# Patient Record
Sex: FEMALE | Race: WHITE | NOT HISPANIC OR LATINO | Employment: OTHER | URBAN - METROPOLITAN AREA
[De-identification: names, ages, dates, MRNs, and addresses within clinical notes are randomized per-mention and may not be internally consistent; named-entity substitution may affect disease eponyms.]

---

## 2017-01-04 ENCOUNTER — PROCEDURE VISIT (OUTPATIENT)
Dept: GYNECOLOGIC ONCOLOGY | Facility: CLINIC | Age: 67
End: 2017-01-04

## 2017-01-04 VITALS
OXYGEN SATURATION: 96 % | SYSTOLIC BLOOD PRESSURE: 119 MMHG | HEART RATE: 65 BPM | RESPIRATION RATE: 16 BRPM | WEIGHT: 228 LBS | HEIGHT: 57 IN | TEMPERATURE: 98.3 F | BODY MASS INDEX: 49.19 KG/M2 | DIASTOLIC BLOOD PRESSURE: 73 MMHG

## 2017-01-04 DIAGNOSIS — C54.1 ENDOMETRIAL CANCER (HCC): Primary | ICD-10-CM

## 2017-01-04 PROCEDURE — 99213 OFFICE O/P EST LOW 20 MIN: CPT | Performed by: NURSE PRACTITIONER

## 2017-01-04 NOTE — MR AVS SNAPSHOT
Keisha PEDROZA Randy   1/4/2017 9:30 AM   Procedure visit    Dept Phone:  [phone]   Encounter #:  [id number]    Provider:  PRIMITIVO Lenz   Department:  [facility] GYNECOLOGIC ONCOLOGY                Your Full Care Plan              Your Updated Medication List          This list is accurate as of: 1/4/17 10:02 AM.  Always use your most recent med list.                aspirin 81 MG tablet   Take 1 tablet by mouth Daily.       benzonatate 100 MG capsule   Commonly known as:  TESSALON PERLES   Take 1 capsule by mouth 3 (Three) Times a Day As Needed for cough.       budesonide-formoterol 160-4.5 MCG/ACT inhaler   Commonly known as:  SYMBICORT   Inhale 2 puffs 2 (two) times a day.       CARTIA  MG 24 hr capsule   Generic drug:  diltiaZEM CD   Take 1 capsule by mouth Daily.       diclofenac 75 MG EC tablet   Commonly known as:  VOLTAREN   Take 1 tablet by mouth 2 (two) times a day.       glipiZIDE 5 MG tablet   Commonly known as:  GLUCOTROL   Take one po bid       lisinopril 20 MG tablet   Commonly known as:  PRINIVIL,ZESTRIL   Take 1 tablet by mouth Daily.       simvastatin 20 MG tablet   Commonly known as:  ZOCOR   Take 1 tablet by mouth Every Night.       Vitamin D3 2000 UNITS capsule               You Were Diagnosed With        Codes Comments    Endometrial cancer    -  Primary ICD-10-CM: C54.1  ICD-9-CM: 182.0       Instructions     None    Patient Instructions History      Upcoming Appointments     Visit Type Date Time Department    FOLLOW UP 1/4/2017  9:30 AM [facility]    OFFICE VISIT 1/5/2017  2:00 PM [facility]    PHYSICAL 4/3/2017  8:00 AM [facility] [place] [facility]    MAMMO [facility] DIAG DIG [facility] LT 6/13/2017  9:45 AM  [facility]    FOLLOW UP 1 UNIT 7/17/2017  9:30 AM [facility]      MyChart Signup     Our records indicate that you have an active [facility] [employer] account.    You can view your After Visit Summary by going to  [email or web address] Yourself and logging in with your [employer] username and password.  If you don't have a [employer] username and password but a parent or guardian has access to your record, the parent or guardian should login with their own [employer] username and password and access your record to view the After Visit Summary.    If you have questions, you can email [email or web address] or call [phone] to talk to our [employer] staff.  Remember, [employer] is NOT to be used for urgent needs.  For medical emergencies, dial 911.               Other Info from Your Visit           Your Appointments     Jan 05, 2017  2:00 PM EST   Office Visit with Bon Neal MD   [facility] FAMILY MEDICINE (--)    [address]  [facility] [address]   [phone]           Arrive 15 minutes prior to appointment.            Apr 03, 2017  8:00 AM EDT   Physical with Bon Neal MD   [facility] (--)    [address]  [facility] [address] [phone]           Arrive 15 minutes prior to appointment.            Jun 13, 2017  9:45 AM EDT   Mammo jeanine diag dig lynn lt with [facility] MAMM 3   [facility] ([place])    [address] [facility] [facility] [address]  [place] [id number]   [phone]           Same day results for Mammography and Ultrasound may extend the patient's time in the department from 1 - 3 or more hours. Bring outside films/reports to your appoint.  Area of concern must be marked on films. If patinet is breastfeeding, vie instructions. Do not apply any powders, perfumes, deodorants, lotions, oils, or body sprays prior to your appointment on the day of the exam. Arrive 20 min prior to your scheduled appointment time.            Jul 17, 2017  9:30 AM EDT   FOLLOW UP with PRIMITIVO Lenz   [facility] GYNECOLOGIC ONCOLOGY (--)    [address]  [facility]  "[id number] [phone]              Allergies     Sulfa Antibiotics        Reason for Visit     Follow-up cancer surveillance      Vital Signs     Blood Pressure Pulse Temperature Respirations Height Weight    119/73 65 98.3 °F (36.8 °C) (Temporal Artery ) 16 57\" (144.8 cm) 228 lb (103 kg)    Oxygen Saturation Body Mass Index Smoking Status             96% 49.34 kg/m2 Never Smoker         Problems and Diagnoses Noted     Endometrial cancer        "
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
## 2017-01-05 ENCOUNTER — OFFICE VISIT (OUTPATIENT)
Dept: FAMILY MEDICINE CLINIC | Facility: CLINIC | Age: 67
End: 2017-01-05

## 2017-01-05 VITALS
HEIGHT: 57 IN | BODY MASS INDEX: 49.19 KG/M2 | RESPIRATION RATE: 16 BRPM | WEIGHT: 228 LBS | HEART RATE: 74 BPM | DIASTOLIC BLOOD PRESSURE: 72 MMHG | SYSTOLIC BLOOD PRESSURE: 122 MMHG | TEMPERATURE: 97.8 F | OXYGEN SATURATION: 98 %

## 2017-01-05 DIAGNOSIS — J40 BRONCHITIS: Primary | ICD-10-CM

## 2017-01-05 PROCEDURE — 99213 OFFICE O/P EST LOW 20 MIN: CPT | Performed by: FAMILY MEDICINE

## 2017-01-05 RX ORDER — PROMETHAZINE HYDROCHLORIDE AND CODEINE PHOSPHATE 6.25; 1 MG/5ML; MG/5ML
SYRUP ORAL
Qty: 240 ML | Refills: 0 | Status: SHIPPED | OUTPATIENT
Start: 2017-01-05 | End: 2017-04-03

## 2017-01-05 NOTE — PROGRESS NOTES
Subjective   Keisha Padilla is a 66 y.o. female.     History of Present Illness   The patient describes several days of runny nose and congestion.  It seems to be not improving with home care.  The patient reports associated sinus drainage and scratchy throat.  The patient is now experiencing an intermittent croupy cough.  There is no fever, chills or acute dyspnea.  Her  and grandchildren have similar symptoms.  She is requesting a cough syrup.    Review of Systems   Constitutional: Negative for chills and fever.   HENT: Positive for congestion, postnasal drip and rhinorrhea. Negative for sinus pressure and sore throat.    Eyes: Negative for discharge.   Respiratory: Positive for cough. Negative for shortness of breath and wheezing.    Gastrointestinal: Negative for diarrhea, nausea and vomiting.   Musculoskeletal: Negative for myalgias.   Skin: Negative for rash.   Allergic/Immunologic: Positive for environmental allergies.   Neurological: Negative for dizziness.     Objective   Physical Exam   Constitutional: She is oriented to person, place, and time. She appears well-developed and well-nourished.   HENT:   Head: Normocephalic and atraumatic.   Right Ear: Hearing, tympanic membrane, external ear and ear canal normal.   Left Ear: Hearing, tympanic membrane, external ear and ear canal normal.   Nose: Mucosal edema and rhinorrhea present.   Mouth/Throat: Uvula is midline. Posterior oropharyngeal erythema present.   Eyes: Conjunctivae are normal. Left eye exhibits no discharge.   Neck: Neck supple.   Cardiovascular: Normal rate, regular rhythm and normal heart sounds.    Pulmonary/Chest: Effort normal and breath sounds normal.   Musculoskeletal: Normal range of motion.   Lymphadenopathy:     She has no cervical adenopathy.   Neurological: She is alert and oriented to person, place, and time.   Skin: Skin is warm. No rash noted.   Psychiatric: She has a normal mood and affect.   Vitals  reviewed.    Assessment/Plan   Diagnoses and all orders for this visit:    Bronchitis  -     PHENERGAN with CODEINE 6.25-10 MG/5ML syrup; Take 5 to 10 ML PO every 8 hours prn cough  -     STAHIST AD 25-60 MG tablet; Take 1/2 to 1 tab po every 12 hours PRN congestion  - Continue with home Symbicort Inhaler  - Rest, fluids, avoid sick contacts and RTC if not improved.

## 2017-01-05 NOTE — MR AVS SNAPSHOT
Keisha Padilla   1/5/2017 2:00 PM   Office Visit    Provider:  Bon Neal MD   Department:  [facility] FAMILY MEDICINE   Dept Phone:  [phone]                Your Full Care Plan              Today's Medication Changes          These changes are accurate as of: 1/5/17  2:43 PM.  If you have any questions, ask your nurse or doctor.               New Medication(s)Ordered:     Chlorcyclizine-Pseudoephed 25-60 MG tablet   Commonly known as:  STAHIST AD   Take 1/2 to 1 tab po every 12 hours PRN congestion   Started by:  Bon Neal MD       promethazine-codeine 6.25-10 MG/5ML syrup   Commonly known as:  PHENERGAN with CODEINE   Take 5 to 10 ML PO every 8 hours prn cough   Started by:  Bon Neal MD         Stop taking medication(s)listed here:     benzonatate 100 MG capsule   Commonly known as:  TESSALON PERLES   Stopped by:  Bon Neal MD                Where to Get Your Medications      These medications were sent to [facility] [address] [place] [address] - [phone]  - [phone] [place] [address]     Phone:  [phone]     Chlorcyclizine-Pseudoephed 25-60 MG tablet         You can get these medications from any pharmacy     Bring a paper prescription for each of these medications     promethazine-codeine 6.25-10 MG/5ML syrup                  Your Updated Medication List          This list is accurate as of: 1/5/17  2:43 PM.  Always use your most recent med list.                aspirin 81 MG tablet   Take 1 tablet by mouth Daily.       budesonide-formoterol 160-4.5 MCG/ACT inhaler   Commonly known as:  SYMBICORT   Inhale 2 puffs 2 (two) times a day.       CARTIA  MG 24 hr capsule   Generic drug:  diltiaZEM CD   Take 1 capsule by mouth Daily.       Chlorcyclizine-Pseudoephed 25-60 MG tablet   Commonly known as:  STAHIST AD   Take 1/2 to 1 tab po every 12 hours PRN congestion       diclofenac 75  MG EC tablet   Commonly known as:  VOLTAREN   Take 1 tablet by mouth 2 (two) times a day.       glipiZIDE 5 MG tablet   Commonly known as:  GLUCOTROL   Take one po bid       lisinopril 20 MG tablet   Commonly known as:  PRINIVIL,ZESTRIL   Take 1 tablet by mouth Daily.       promethazine-codeine 6.25-10 MG/5ML syrup   Commonly known as:  PHENERGAN with CODEINE   Take 5 to 10 ML PO every 8 hours prn cough       simvastatin 20 MG tablet   Commonly known as:  ZOCOR   Take 1 tablet by mouth Every Night.       Vitamin D3 2000 UNITS capsule               You Were Diagnosed With        Codes Comments    Bronchitis    -  Primary ICD-10-CM: J40  ICD-9-CM: 490       Instructions     None    Patient Instructions History      [employer] Signup     Our records indicate that you have an active [employer] account.    You can view your After Visit Summary by going to [employer] and logging in with your [employer] username and password.  If you don't have a [employer] username and password but a parent or guardian has access to your record, the parent or guardian should login with their own [employer] username and password and access your record to view the After Visit Summary.    If you have questions, you can email [email or web address] or call [phone] to talk to our [employer] staff.  Remember, [employer] is NOT to be used for urgent needs.  For medical emergencies, dial 911.               Other Info from Your Visit           Your Appointments     Apr 03, 2017  8:00 AM EDT   Physical with Bon Neal MD   [facility] (--)    [address]  [facility] [address] [phone]           Arrive 15 minutes prior to appointment.            Jun 13, 2017  9:45 AM EDT   Mammo jeanine diag dig lynn lt with [facility] MAMM 3   [facility] ([place])    [address] [address] [phone]           Same day  "results for Mammography and Ultrasound may extend the patient's time in the department from 1 - 3 or more hours. Bring outside films/reports to your appoint.  Area of concern must be marked on films. If patinet is breastfeeding, vie instructions. Do not apply any powders, perfumes, deodorants, lotions, oils, or body sprays prior to your appointment on the day of the exam. Arrive 20 min prior to your scheduled appointment time.            Jul 17, 2017  9:30 AM EDT   FOLLOW UP with PRIMITIVO Lenz   [facility] GYNECOLOGIC ONCOLOGY (--)    [address] [address] [phone]              Allergies     Sulfa Antibiotics        Vital Signs     Blood Pressure Pulse Temperature Respirations Height Weight    122/72 74 97.8 °F (36.6 °C) 16 57\" (144.8 cm) 228 lb (103 kg)    Oxygen Saturation Body Mass Index Smoking Status             98% 49.34 kg/m2 Never Smoker         Problems and Diagnoses Noted     Bronchitis    -  Primary      "

## 2017-04-03 ENCOUNTER — OFFICE VISIT (OUTPATIENT)
Dept: FAMILY MEDICINE CLINIC | Facility: CLINIC | Age: 67
End: 2017-04-03

## 2017-04-03 VITALS
OXYGEN SATURATION: 99 % | SYSTOLIC BLOOD PRESSURE: 118 MMHG | WEIGHT: 230 LBS | BODY MASS INDEX: 48.28 KG/M2 | TEMPERATURE: 97.5 F | RESPIRATION RATE: 16 BRPM | DIASTOLIC BLOOD PRESSURE: 74 MMHG | HEART RATE: 59 BPM | HEIGHT: 58 IN

## 2017-04-03 DIAGNOSIS — Z00.00 MEDICARE ANNUAL WELLNESS VISIT, SUBSEQUENT: Primary | ICD-10-CM

## 2017-04-03 DIAGNOSIS — I10 ESSENTIAL HYPERTENSION: ICD-10-CM

## 2017-04-03 DIAGNOSIS — E55.9 VITAMIN D DEFICIENCY: ICD-10-CM

## 2017-04-03 DIAGNOSIS — E78.01 FAMILIAL HYPERCHOLESTEROLEMIA: ICD-10-CM

## 2017-04-03 DIAGNOSIS — E11.9 TYPE 2 DIABETES MELLITUS WITHOUT COMPLICATION, WITHOUT LONG-TERM CURRENT USE OF INSULIN (HCC): ICD-10-CM

## 2017-04-03 PROBLEM — C54.1 ENDOMETRIAL CANCER (HCC): Status: RESOLVED | Noted: 2017-01-04 | Resolved: 2017-04-03

## 2017-04-03 LAB
25(OH)D3 SERPL-MCNC: 32.9 NG/ML
ALBUMIN SERPL-MCNC: 4.2 G/DL (ref 3.2–4.8)
ALBUMIN/GLOB SERPL: 1.6 G/DL (ref 1.5–2.5)
ALP SERPL-CCNC: 119 U/L (ref 25–100)
ALT SERPL W P-5'-P-CCNC: 30 U/L (ref 7–40)
ANION GAP SERPL CALCULATED.3IONS-SCNC: 11 MMOL/L (ref 3–11)
ARTICHOKE IGE QN: 94 MG/DL (ref 0–130)
AST SERPL-CCNC: 29 U/L (ref 0–33)
BASOPHILS # BLD AUTO: 0.02 10*3/MM3 (ref 0–0.2)
BASOPHILS NFR BLD AUTO: 0.3 % (ref 0–1)
BILIRUB BLD-MCNC: NEGATIVE MG/DL
BILIRUB SERPL-MCNC: 0.5 MG/DL (ref 0.3–1.2)
BUN BLD-MCNC: 19 MG/DL (ref 9–23)
BUN/CREAT SERPL: 23.8 (ref 7–25)
CALCIUM SPEC-SCNC: 9.7 MG/DL (ref 8.7–10.4)
CHLORIDE SERPL-SCNC: 103 MMOL/L (ref 99–109)
CHOLEST SERPL-MCNC: 180 MG/DL (ref 0–200)
CLARITY, POC: CLEAR
CO2 SERPL-SCNC: 27 MMOL/L (ref 20–31)
COLOR UR: YELLOW
CREAT BLD-MCNC: 0.8 MG/DL (ref 0.6–1.3)
CRP SERPL-MCNC: 1.46 MG/DL (ref 0–1)
DEPRECATED RDW RBC AUTO: 46.5 FL (ref 37–54)
EOSINOPHIL # BLD AUTO: 0.22 10*3/MM3 (ref 0.1–0.3)
EOSINOPHIL NFR BLD AUTO: 2.8 % (ref 0–3)
ERYTHROCYTE [DISTWIDTH] IN BLOOD BY AUTOMATED COUNT: 13.5 % (ref 11.3–14.5)
GFR SERPL CREATININE-BSD FRML MDRD: 71 ML/MIN/1.73
GLOBULIN UR ELPH-MCNC: 2.6 GM/DL
GLUCOSE BLD-MCNC: 123 MG/DL (ref 70–100)
GLUCOSE UR STRIP-MCNC: NEGATIVE MG/DL
HBA1C MFR BLD: 6.7 %
HCT VFR BLD AUTO: 43.8 % (ref 34.5–44)
HCV AB SER DONR QL: NORMAL
HDLC SERPL-MCNC: 52 MG/DL (ref 40–60)
HGB BLD-MCNC: 14.3 G/DL (ref 11.5–15.5)
IMM GRANULOCYTES # BLD: 0.03 10*3/MM3 (ref 0–0.03)
IMM GRANULOCYTES NFR BLD: 0.4 % (ref 0–0.6)
KETONES UR QL: NEGATIVE
LEUKOCYTE EST, POC: ABNORMAL
LYMPHOCYTES # BLD AUTO: 2.04 10*3/MM3 (ref 0.6–4.8)
LYMPHOCYTES NFR BLD AUTO: 26.1 % (ref 24–44)
MCH RBC QN AUTO: 30.8 PG (ref 27–31)
MCHC RBC AUTO-ENTMCNC: 32.6 G/DL (ref 32–36)
MCV RBC AUTO: 94.4 FL (ref 80–99)
MONOCYTES # BLD AUTO: 0.56 10*3/MM3 (ref 0–1)
MONOCYTES NFR BLD AUTO: 7.2 % (ref 0–12)
NEUTROPHILS # BLD AUTO: 4.96 10*3/MM3 (ref 1.5–8.3)
NEUTROPHILS NFR BLD AUTO: 63.2 % (ref 41–71)
NITRITE UR-MCNC: NEGATIVE MG/ML
PH UR: 5.5 [PH] (ref 5–8)
PLATELET # BLD AUTO: 225 10*3/MM3 (ref 150–450)
PMV BLD AUTO: 12.3 FL (ref 6–12)
POC CREATININE URINE: 50
POC MICROALBUMIN URINE: 10
POTASSIUM BLD-SCNC: 4.5 MMOL/L (ref 3.5–5.5)
PROT SERPL-MCNC: 6.8 G/DL (ref 5.7–8.2)
PROT UR STRIP-MCNC: NEGATIVE MG/DL
RBC # BLD AUTO: 4.64 10*6/MM3 (ref 3.89–5.14)
RBC # UR STRIP: NEGATIVE /UL
SODIUM BLD-SCNC: 141 MMOL/L (ref 132–146)
SP GR UR: 1 (ref 1–1.03)
TRIGL SERPL-MCNC: 150 MG/DL (ref 0–150)
TSH SERPL DL<=0.05 MIU/L-ACNC: 1.78 MIU/ML (ref 0.35–5.35)
URATE SERPL-MCNC: 6.3 MG/DL (ref 3.1–7.8)
UROBILINOGEN UR QL: NORMAL
WBC NRBC COR # BLD: 7.83 10*3/MM3 (ref 3.5–10.8)

## 2017-04-03 PROCEDURE — 85025 COMPLETE CBC W/AUTO DIFF WBC: CPT | Performed by: FAMILY MEDICINE

## 2017-04-03 PROCEDURE — G0439 PPPS, SUBSEQ VISIT: HCPCS | Performed by: FAMILY MEDICINE

## 2017-04-03 PROCEDURE — 86140 C-REACTIVE PROTEIN: CPT | Performed by: FAMILY MEDICINE

## 2017-04-03 PROCEDURE — 81003 URINALYSIS AUTO W/O SCOPE: CPT | Performed by: FAMILY MEDICINE

## 2017-04-03 PROCEDURE — 36415 COLL VENOUS BLD VENIPUNCTURE: CPT | Performed by: FAMILY MEDICINE

## 2017-04-03 PROCEDURE — 82044 UR ALBUMIN SEMIQUANTITATIVE: CPT | Performed by: FAMILY MEDICINE

## 2017-04-03 PROCEDURE — 82306 VITAMIN D 25 HYDROXY: CPT | Performed by: FAMILY MEDICINE

## 2017-04-03 PROCEDURE — 80061 LIPID PANEL: CPT | Performed by: FAMILY MEDICINE

## 2017-04-03 PROCEDURE — 84550 ASSAY OF BLOOD/URIC ACID: CPT | Performed by: FAMILY MEDICINE

## 2017-04-03 PROCEDURE — 83036 HEMOGLOBIN GLYCOSYLATED A1C: CPT | Performed by: FAMILY MEDICINE

## 2017-04-03 PROCEDURE — 84443 ASSAY THYROID STIM HORMONE: CPT | Performed by: FAMILY MEDICINE

## 2017-04-03 PROCEDURE — 80053 COMPREHEN METABOLIC PANEL: CPT | Performed by: FAMILY MEDICINE

## 2017-04-03 PROCEDURE — 86803 HEPATITIS C AB TEST: CPT | Performed by: FAMILY MEDICINE

## 2017-04-03 RX ORDER — GLIPIZIDE 5 MG/1
TABLET ORAL
Qty: 180 TABLET | Refills: 3 | Status: SHIPPED | OUTPATIENT
Start: 2017-04-03 | End: 2017-10-03 | Stop reason: SDUPTHER

## 2017-04-03 RX ORDER — LISINOPRIL 20 MG/1
20 TABLET ORAL DAILY
Qty: 90 TABLET | Refills: 3 | Status: SHIPPED | OUTPATIENT
Start: 2017-04-03 | End: 2017-10-03 | Stop reason: SDUPTHER

## 2017-04-03 RX ORDER — SIMVASTATIN 20 MG
20 TABLET ORAL NIGHTLY
Qty: 90 TABLET | Refills: 3 | Status: SHIPPED | OUTPATIENT
Start: 2017-04-03 | End: 2017-10-03 | Stop reason: SDUPTHER

## 2017-04-03 RX ORDER — DILTIAZEM HYDROCHLORIDE 240 MG/1
240 CAPSULE, COATED, EXTENDED RELEASE ORAL DAILY
Qty: 90 CAPSULE | Refills: 3 | Status: SHIPPED | OUTPATIENT
Start: 2017-04-03 | End: 2017-10-03 | Stop reason: SDUPTHER

## 2017-04-08 NOTE — PROGRESS NOTES
Your lab results are all stable and satisfactory.  If you have any questions or concerns, please don't hesitate to contact us.  Thank you.

## 2017-05-03 ENCOUNTER — TELEPHONE (OUTPATIENT)
Dept: GYNECOLOGIC ONCOLOGY | Facility: CLINIC | Age: 67
End: 2017-05-03

## 2017-06-13 ENCOUNTER — HOSPITAL ENCOUNTER (OUTPATIENT)
Dept: MAMMOGRAPHY | Facility: HOSPITAL | Age: 67
Discharge: HOME | End: 2017-06-13
Admitting: NURSE PRACTITIONER

## 2017-06-13 ENCOUNTER — TRANSCRIBE ORDERS (OUTPATIENT)
Dept: GYNECOLOGIC ONCOLOGY | Facility: CLINIC | Age: 67
End: 2017-06-13

## 2017-06-13 DIAGNOSIS — R92.8 ABNORMAL MAMMOGRAM: ICD-10-CM

## 2017-06-13 DIAGNOSIS — R92.8 ABNORMAL MAMMOGRAM: Primary | ICD-10-CM

## 2017-06-13 PROCEDURE — G0206 DX MAMMO INCL CAD UNI: HCPCS | Performed by: RADIOLOGY

## 2017-06-13 PROCEDURE — G0206 DX MAMMO INCL CAD UNI: HCPCS

## 2017-06-13 PROCEDURE — G0279 TOMOSYNTHESIS, MAMMO: HCPCS

## 2017-06-13 PROCEDURE — G0279 TOMOSYNTHESIS, MAMMO: HCPCS | Performed by: RADIOLOGY

## 2017-07-17 ENCOUNTER — OFFICE VISIT (OUTPATIENT)
Dept: GYNECOLOGIC ONCOLOGY | Facility: CLINIC | Age: 67
End: 2017-07-17

## 2017-07-17 VITALS
OXYGEN SATURATION: 96 % | TEMPERATURE: 98.1 F | DIASTOLIC BLOOD PRESSURE: 65 MMHG | BODY MASS INDEX: 48.28 KG/M2 | HEART RATE: 78 BPM | WEIGHT: 231 LBS | SYSTOLIC BLOOD PRESSURE: 144 MMHG | RESPIRATION RATE: 20 BRPM

## 2017-07-17 DIAGNOSIS — C54.1 ENDOMETRIAL CANCER (HCC): Primary | ICD-10-CM

## 2017-07-17 PROCEDURE — 99214 OFFICE O/P EST MOD 30 MIN: CPT | Performed by: NURSE PRACTITIONER
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
## 2017-08-07 ENCOUNTER — TELEPHONE (OUTPATIENT)
Dept: SOCIAL WORK | Facility: HOSPITAL | Age: 67
End: 2017-08-07

## 2017-08-07 NOTE — TELEPHONE ENCOUNTER
SW called pt to discuss interest in advanced care planning.  SW left message asking pt to call back at her earliest convenience.  SW available for ongoing support and resource needs.

## 2017-09-08 ENCOUNTER — OFFICE VISIT (OUTPATIENT)
Dept: FAMILY MEDICINE CLINIC | Facility: CLINIC | Age: 67
End: 2017-09-08

## 2017-09-08 VITALS — RESPIRATION RATE: 16 BRPM | WEIGHT: 227 LBS | HEART RATE: 72 BPM | TEMPERATURE: 99.8 F | BODY MASS INDEX: 47.44 KG/M2

## 2017-09-08 DIAGNOSIS — K52.9 GASTROENTERITIS: Primary | ICD-10-CM

## 2017-09-08 PROCEDURE — 99213 OFFICE O/P EST LOW 20 MIN: CPT | Performed by: FAMILY MEDICINE

## 2017-09-08 PROCEDURE — 96372 THER/PROPH/DIAG INJ SC/IM: CPT | Performed by: FAMILY MEDICINE

## 2017-09-08 RX ORDER — DICYCLOMINE HYDROCHLORIDE 10 MG/1
10 CAPSULE ORAL 3 TIMES DAILY PRN
Qty: 20 CAPSULE | Refills: 1 | Status: SHIPPED | OUTPATIENT
Start: 2017-09-08 | End: 2018-04-03

## 2017-09-08 RX ORDER — PROMETHAZINE HYDROCHLORIDE 25 MG/ML
12.5 INJECTION, SOLUTION INTRAMUSCULAR; INTRAVENOUS ONCE
Status: COMPLETED | OUTPATIENT
Start: 2017-09-08 | End: 2017-09-08

## 2017-09-08 RX ADMIN — PROMETHAZINE HYDROCHLORIDE 12.5 MG: 25 INJECTION, SOLUTION INTRAMUSCULAR; INTRAVENOUS at 16:28

## 2017-09-08 NOTE — PROGRESS NOTES
Subjective   Keisha Padilla is a 67 y.o. female.     History of Present Illness   acutelu ill last night with vomiting, loss of appetite.  Sick after eating barrera, egg and cheese biscutt yesterday   No diarrhea. Rested poor. Does not feel well today. Past gallbladder surgery, hysterectomy.  The following portions of the patient's history were reviewed and updated as appropriate: allergies, current medications, past family history, past medical history, past social history, past surgical history and problem list.    Review of Systems   Constitutional: Negative.    HENT: Negative.    Respiratory: Negative.    Cardiovascular: Negative.    Gastrointestinal: Positive for abdominal pain, nausea and vomiting.       Objective   Physical Exam   Constitutional: She appears well-developed and well-nourished. No distress.   Pulmonary/Chest: Effort normal and breath sounds normal.   Abdominal: She exhibits no distension and no mass. Bowel sounds are decreased. There is generalized tenderness. There is no CVA tenderness.   Vitals reviewed.      Assessment/Plan   Keisha was seen today for abdominal pain.    Diagnoses and all orders for this visit:    Gastroenteritis  -     promethazine (PHENERGAN) injection 12.5 mg; Inject 0.5 mL into the shoulder, thigh, or buttocks 1 (One) Time.  -     dicyclomine (BENTYL) 10 MG capsule; Take 1 capsule by mouth 3 (Three) Times a Day As Needed (nausea).        No milk next tow days.

## 2017-09-10 NOTE — PROGRESS NOTES
ED HPI GENERAL MEDICAL PROBLEM





- General


Chief Complaint: General


Stated Complaint: can't walk


Time Seen by Provider: 09/10/17 17:40


Source of Information: Reports: Patient


History Limitations: Reports: No Limitations





- History of Present Illness


INITIAL COMMENTS - FREE TEXT/NARRATIVE: 


I am in the emergency room  discharging my patient. Pt's  bring her into 

the emergency room in Wheel chair, and she is crying and tearful and claims 

that she has been having pain in both her legs since friday. Pain is all over 

the legs. She is on gabapentin and requip and tinazadine, she claims none of 

them are helping. No swelling of the legs. No redness. No swelling of pain in 

her knees. No shortness of breath of chest tightness. . No trauma to the legs. 





Pt is tearful. She claims Motrin does not work for her. Rates pain at 9/10. 














- Related Data


 Allergies











Allergy/AdvReac Type Severity Reaction Status Date / Time


 


acetaminophen [From Percocet] Allergy  Numbness Verified 09/10/17 17:40


 


adhesive tape Allergy  Rash Verified 09/10/17 17:40


 


ketorolac tromethamine Allergy  Nausea and Verified 09/10/17 17:40





[From Toradol]   Vomiting  


 


oxycodone [From Percocet] Allergy  Numbness Verified 09/10/17 17:40











Home Meds: 


 Home Meds





Ipratropium/Albuterol Sulfate [Duoneb 0.5 MG-3 MG/3 ML] 1 - 2 puff INH Q4H PRN 

13 [History]


Omeprazole 20 mg PO BID 13 [History]


risperiDONE [RisperiDAL M-Tab] 3 mg PO DAILY 13 [History]


Acetaminophen [Tylenol Extra Strength] 500 mg PO Q6H 11/21/15 [History]


Budesonide/Formoterol [Symbicort 160-4.5 MCG] 2 puff INH BID 11/21/15 [History]


Colestipol [Colestipol HCl] 1 - 2 tab PO BID PRN 11/21/15 [History]


Gabapentin [Neurontin] 1 tab PO BID 11/21/15 [History]


Nicotine [Habitrol] 1 patch TOP DAILY 11/21/15 [History]


OXcarbazepine [Trileptal] 900 mg PO BID 11/21/15 [History]


QUEtiapine [SEROquel] 200 tab PO BID 11/21/15 [History]


SUMAtriptan [Imitrex] 1 tab PO ASDIRECTED PRN 11/21/15 [History]


Spironolactone [Aldactone] 3 tab PO BID 11/21/15 [History]


Topiramate 1 tab PO DAILY 11/21/15 [History]


buPROPion [Wellbutrin SR] 100 mg PO BID 11/21/15 [History]


hydrOXYzine HCl [hydrOXYzine] 1 - 2 cap PO ASDIRECTED PRN 11/21/15 [History]


rOPINIRole [Requip] 1 tab PO BEDTIME 11/21/15 [History]


tiZANidine [Zanaflex] 4 mg PO Q6H 16 [History]


Zolpidem Tartrate [Ambien Cr] 12.5 tab PO DAILY 17 [History]


Ketorolac [Toradol] 10 mg PO Q6H PRN #16 tab 17 [Rx]


Methocarbamol [Robaxin-750] 750 mg PO Q6HR #40 tablet 17 [Rx]











Past Medical History


HEENT History: Reports: Impaired Vision, Other (See Below)


Other HEENT History: glasses for reading,


Respiratory History: Reports: Asthma, Bronchitis, Recurrent, Pneumonia, 

Recurrent, Other (See Below)


Other Respiratory History: Emphysema


Gastrointestinal History: Reports: Bowel Obstruction, Irritable Bowel Syndrome


Genitourinary History: Reports: UTI, Recurrent


OB/GYN History: Reports: Dysfunctional Uterine Bleeding, Pregnancy


Musculoskeletal History: Reports: Back Pain, Chronic, Other (See Below)


Other Musculoskeletal History: back / butt pain, shoots down left leg


Neurological History: Reports: Migraines


Psychiatric History: Reports: Anxiety, Depression, Suicide Attempt, Suicidal 

Ideation


Endocrine/Metabolic History: Reports: Diabetes, Type II, Obesity/BMI 30+


Hematologic History: Reports: Blood Transfusion(s), Iron Deficiency


Dermatologic History: Reports: Eczema, Other (See Below)


Other Dermatologic History: Allergy to paper tape





- Infectious Disease History


Infectious Disease History: Reports: Chicken Pox, Measles, Rubella





- Past Surgical History


HEENT Surgical History: Reports: Other (See Below)


GI Surgical History: Reports: Appendectomy, Cholecystectomy, Hernia, Abdominal, 

Other (See Below)


Female  Surgical History: Reports:  Section, Hysterectomy





Social & Family History





- Family History


Family Medical History: Noncontributory





- Tobacco Use


Smoking Status *Q: Current Every Day Smoker


Years of Tobacco use: 35


Packs/Tins Daily: 0.5


Used Tobacco, but Quit: No


Second Hand Smoke Exposure: No





- Caffeine Use


Caffeine Use: Reports: Coffee, Tea





- Alcohol Use


Days Per Week of Alcohol Use: 0





- Recreational Drug Use


Recreational Drug Use: No





ED ROS GENERAL





- Review of Systems


Review Of Systems: See Below


Constitutional: Denies: Fever, Chills


HEENT: Denies: Throat Pain, Throat Swelling, Vision Change


Respiratory: Denies: Shortness of Breath, Wheezing, Pleuritic Chest Pain, Cough

, Sputum


Cardiovascular: Denies: Chest Pain, Lightheadedness


GI/Abdominal: Denies: Abdominal Pain, Nausea, Vomiting


: Denies: Flank Pain, Frequency


Musculoskeletal: Reports: Leg Pain.  Denies: Foot Pain, Joint Pain, Joint 

Swelling


Skin: Denies: Bruising, Pruritis, Rash


Neurological: Denies: Confusion, Dizziness, Headache


Psychiatric: Denies: Agitation, Anxiety, Confusion





ED EXAM, GENERAL





- Physical Exam


Exam: See Below


Exam Limited By: No Limitations


General Appearance: Alert, WD/WN, No Apparent Distress


Eye Exam: Bilateral Eye: EOMI, PERRL


Ears: Normal External Exam, Normal Canal, Hearing Grossly Normal, Normal TMs


Ear Exam: Bilateral Ear: Auricle Normal, Canal Normal, TM normal


Nose: Normal Inspection, Normal Mucosa, No Blood


Throat/Mouth: Normal Inspection, Normal Lips, Normal Teeth, Normal Gums, Normal 

Oropharynx, Normal Voice, No Airway Compromise


Head: Atraumatic, Normocephalic


Neck: Normal Inspection, Supple, Non-Tender, Full Range of Motion


Respiratory/Chest: Lungs Clear, Normal Breath Sounds, No Accessory Muscle Use, 

Chest Non-Tender


Cardiovascular: Normal Peripheral Pulses, Regular Rate, Rhythm, No Edema, No 

Gallop, No JVD, No Murmur, No Rub


Extremities: Normal Inspection, Normal Range of Motion, Non-Tender, No Pedal 

Edema, Normal Capillary Refill, Other (Both legs Appear symmetrical. there is 

no swelling or erythema. Pt is able to stand on the legs. Also she by her self 

gets on form the wheelchair and shifts to bed and rasies her legs and sleep. Pt 

is loud and crying. Negative calf tenderness and negative Homans sign. She 

claism her meds are not helping.)





Course





- Vital Signs


Text/Narrative:: 


Pt's examination of the legs is negative for infection, DVT or acute trauma. 

This pain has been going on for 3 days now, but no clinical signs of 

inflammation seen on exam. She does not have chest tightness or SOB, and her 

SPO2 is 100% on room air with normal Pulse and blood pressure.





I cannot explain her pain. Pt does visit emergency room frequently with pain 

complaints with no objective findings. She has been off all narcotics.








I did get xrays of the leg, to see if she has any acute bony injury and her 

xrays appear normal. Pt is already on muscle relaxant for muscle spasms, requip 

for restless leg, and gabapentin for nerve pain. Pt reassured that her pain is 

not from fracture or acute neurovascular injury. Advised to rest and take 

motrin 800mg 3 times daily.





Pt and her  start to talk about visiting  in the [place] to get 

some pain medication.I have advised patient to followup with Dr. Portillo. And I do 

not see any emergent cause of her leg pain.





Last Recorded V/S: 


 Last Vital Signs











Temp      


 


Pulse  82   09/10/17 18:08


 


Resp      


 


BP  142/65 H  09/10/17 18:08


 


Pulse Ox  100   09/10/17 18:08














- Orders/Labs/Meds


Orders: 


 Active Orders 24 hr











 Category Date Time Status


 


 Tibia Fibula Bi [CR] Stat Exams  09/10/17 17:55 Ordered














Departure





- Departure


Time of Disposition: 18:30


Disposition: Home, Self-Care 01


Condition: Fair


Clinical Impression: 


 Leg pain








- Discharge Information


Referrals: 


PCP,None [Primary Care Provider] - 


Forms:  ED Department Discharge





- Problem List & Annotations


(1) Leg pain


SNOMED Code(s): 23810296


   Code(s): M79.606 - PAIN IN LEG, UNSPECIFIED   Status: Acute   Current Visit: 

Yes   





- Problem List Review


Problem List Initiated/Reviewed/Updated: Yes





- My Orders


Last 24 Hours: 


My Active Orders





09/10/17 17:55


Tibia Fibula Bi [CR] Stat 














- Assessment/Plan


Last 24 Hours: 


My Active Orders





09/10/17 17:55


Tibia Fibula Bi [CR] Stat 











Assessment:: 


Leg Pain





Plan: 


Pt's examination of the legs is negative for infection, DVT or acute trauma. 

This pain has been going on for 3 days now, but no clinical signs of 

inflammation seen on exam. She does not have chest tightness or SOB, and her 

SPO2 is 100% on room air with normal Pulse and blood pressure.





I cannot explain her pain. Pt does visit emergency room frequently with pain 

complaints with no objective findings. She has been off all narcotics.








I did get xrays of the leg, to see if she has any acute bony injury and her 

xrays appear normal. Pt is already on muscle relaxant for muscle spasms, requip 

for restless leg, and gabapentin for nerve pain. Pt reassured that her pain is 

not from fracture or acute neurovascular injury. Pt requesting toradol, she did 

receive toradol 30mg IM.Advised to rest and take motrin 800mg 3 times daily.





Pt and her  start to talk about visiting  [place] to get 

some pain medication.I have advised patient to followup with Dr. Portillo. And I do 

not see any emergent cause of her leg pain. QUICK REFERENCE INFORMATION:  The ABCs of the Annual Wellness Visit    Subsequent Medicare Wellness Visit    HEALTH RISK ASSESSMENT    1950    Recent Hospitalizations:  No recent hospitalization(s)..    Current Medical Providers: Bon Neal MD     Smoking Status:  History   Smoking Status   • Never Smoker   Smokeless Tobacco   • Never Used       Alcohol Consumption:  History   Alcohol Use No     Depression Screen:   PHQ-9 Depression Screening 4/3/2017   Little interest or pleasure in doing things 0   Feeling down, depressed, or hopeless 0   Trouble falling or staying asleep, or sleeping too much 0   Feeling tired or having little energy 0   Poor appetite or overeating 0   Feeling bad about yourself - or that you are a failure or have let yourself or your family down 0   Trouble concentrating on things, such as reading the newspaper or watching television 0   Moving or speaking so slowly that other people could have noticed. Or the opposite - being so fidgety or restless that you have been moving around a lot more than usual 0   Thoughts that you would be better off dead, or of hurting yourself in some way 0   PHQ-9 Total Score 0   If you checked off any problems, how difficult have these problems made it for you to do your work, take care of things at home, or get along with other people? Not difficult at all       Health Habits and Functional and Cognitive Screening:  Functional & Cognitive Status 4/3/2017   Do you have difficulty preparing food and eating? No   Do you have difficulty bathing yourself? No   Do you have difficulty getting dressed? No   Do you have difficulty using the toilet? No   Do you have difficulty moving around from place to place? No   In the past year have you fallen or experienced a near fall? No   Do you need help using the phone?  No   Are you deaf or do you have serious difficulty hearing?  No   Do you need help with transportation? No   Do you need help shopping? No   Do you need  help preparing meals?  No   Do you need help with housework?  No   Do you need help with laundry? No   Do you need help taking your medications? No   Do you need help managing money? No   Do you have difficulty concentrating, remembering or making decisions? No     Health Habits  Current Diet: Well Balanced Diet  Dental Exam: Up to date  Eye Exam: Up to date  Exercise (times per week): 7 times per week  Current Exercise Activities Include: Walking    Does the patient have evidence of cognitive impairment? No  Asprin use counseling:yes    Recent Lab Results:    Lab Results   Component Value Date    BUN 22 04/19/2016    CREATININE 1.0 04/19/2016     04/19/2016    K 4.5 04/19/2016    CO2 26 10/05/2015    CALCIUM 9.5 10/05/2015    ALBUMIN 3.7 04/19/2016    BILITOT 0.4 04/19/2016    ALKPHOS 91 04/19/2016    AST 27 04/19/2016    ALT 23 04/19/2016     Lab Results   Component Value Date    CHLPL 175 10/05/2015    TRIG 109 10/05/2015    HDL 59 10/05/2015   LDL:108     Lab Results   Component Value Date    HGBA1C 6.8 10/03/2016   Visual Acuity: 20/20 corrected    Age-appropriate Screening Schedule:  Refer to the list below for future screening recommendations based on patient's age, sex and/or medical conditions. Orders for these recommended tests are listed in the plan section. The patient has been provided with a written plan.    Health Maintenance   Topic Date Due   • PNEUMOCOCCAL VACCINES (65+ LOW/MEDIUM RISK) (2 of 2 - PPSV23) 10/03/2017   • DIABETIC FOOT EXAM  10/03/2017   • LIPID PANEL  10/03/2017   • HEMOGLOBIN A1C  10/03/2017   • URINE MICROALBUMIN  10/03/2017   • DIABETIC EYE EXAM  11/11/2017   • MAMMOGRAM  12/13/2018   • DXA SCAN  04/03/2019   • COLONOSCOPY  03/27/2024   • TDAP/TD VACCINES (2 - Td) 10/03/2026   • INFLUENZA VACCINE  Completed   • ZOSTER VACCINE  Addressed        Subjective   History of Present Illness    Keisha Padilla is a 67 y.o. female who presents for an Subsequent Wellness Visit.  She is  also here to follow up on her stable chronic medical problems which include DM2, HTN & HLD.  She is tolerating her medications well with no adverse events.  She is needing multiple medication refills today.  She voices no acute symptoms.  She is anticipating annual lab evaluation.  Her last dilated diabetic eye exam was November 11, 2016 (report reviewed).    The following portions of the patient's history were reviewed and updated as appropriate: allergies, current medications, past family history, past medical history, past social history, past surgical history and problem list.    Outpatient Medications Prior to Visit   Medication Sig Dispense Refill   • aspirin 81 MG tablet Take 1 tablet by mouth Daily. 90 tablet 3   • budesonide-formoterol (SYMBICORT) 160-4.5 MCG/ACT inhaler Inhale 2 puffs 2 (two) times a day. 1 inhaler 0   • Chlorcyclizine-Pseudoephed (STAHIST AD) 25-60 MG tablet Take 1/2 to 1 tab po every 12 hours PRN congestion 30 tablet 0   • Cholecalciferol (VITAMIN D3) 2000 UNITS capsule Take 1 capsule by mouth daily.     • diclofenac (VOLTAREN) 75 MG EC tablet Take 1 tablet by mouth 2 (two) times a day. 60 tablet 2   • CARTIA  MG 24 hr capsule Take 1 capsule by mouth Daily. 90 capsule 3   • glipiZIDE (GLUCOTROL) 5 MG tablet Take one po bid 180 tablet 3   • lisinopril (PRINIVIL,ZESTRIL) 20 MG tablet Take 1 tablet by mouth Daily. 90 tablet 3   • simvastatin (ZOCOR) 20 MG tablet Take 1 tablet by mouth Every Night. 90 tablet 3   • promethazine-codeine (PHENERGAN with CODEINE) 6.25-10 MG/5ML syrup Take 5 to 10 ML PO every 8 hours prn cough 240 mL 0       Advanced Care Planning: has an advanced directive - a copy has not been provided    Identification of Risk Factors:  Risk factors include: weight , cardiovascular risk and increased fall risk.    Review of Systems   Constitutional: Negative.    HENT: Negative.    Eyes: Negative.    Respiratory: Negative.    Cardiovascular: Negative.         Ankle  swelling at the end of the day that resolves with leg elevation   Gastrointestinal: Negative.    Endocrine: Negative.    Genitourinary: Negative.    Musculoskeletal: Negative.    Skin: Negative.    Allergic/Immunologic: Negative.    Neurological: Negative.    Hematological: Negative.    Psychiatric/Behavioral: Negative.      Compared to one year ago, the patient feels her physical health is the same.  Compared to one year ago, the patient feels her mental health is the same.    Objective     Physical Exam   Constitutional: She is oriented to person, place, and time. She appears well-developed and well-nourished. She is cooperative.   HENT:   Head: Normocephalic and atraumatic.   Right Ear: Hearing and external ear normal.   Left Ear: Hearing and external ear normal.   Nose: Nose normal.   Mouth/Throat: Uvula is midline, oropharynx is clear and moist and mucous membranes are normal.   Eyes: Conjunctivae and EOM are normal. Pupils are equal, round, and reactive to light. No scleral icterus.   Vision 20/20   Neck: Trachea normal and normal range of motion. Neck supple. No JVD present. Carotid bruit is not present. No thyromegaly present.   Cardiovascular: Normal rate, regular rhythm, normal heart sounds and intact distal pulses.    Ankle edema with evidence of varicose veins and chronic venous insufficiency   Pulmonary/Chest: Effort normal and breath sounds normal.   Abdominal: Soft. Bowel sounds are normal. There is no hepatosplenomegaly. There is no tenderness.   Musculoskeletal: Normal range of motion.   OA changes to her knees   Lymphadenopathy:     She has no cervical adenopathy.   Neurological: She is alert and oriented to person, place, and time. She has normal strength and normal reflexes. No sensory deficit. Gait normal.   Skin: Skin is warm and dry.   Psychiatric: She has a normal mood and affect. Her speech is normal and behavior is normal. Judgment and thought content normal. Cognition and memory are  "normal.   Nursing note and vitals reviewed.    Vitals:    04/03/17 0810   BP: 118/74   Pulse: 59   Resp: 16   Temp: 97.5 °F (36.4 °C)   SpO2: 99%   Weight: 230 lb (104 kg)   Height: 58\" (147.3 cm)     Body mass index is 48.07 kg/(m^2).  Discussed the patient's BMI with her. The BMI is above average; BMI management plan is completed.    Assessment/Plan   Patient Self-Management and Personalized Health Advice  The patient has been provided with information about: diet, exercise, weight management, prevention of cardiac or vascular disease and fall prevention and preventive services including:   · Diabetes screening, see lab orders, Exercise counseling provided, Fall Risk assessment done, Glaucoma screening recommended, Nutrition counseling provided, Pneumococcal vaccine .    Visit Diagnoses:    ICD-10-CM ICD-9-CM   1. Medicare annual wellness visit, subsequent Z00.00 V70.0   2. Type 2 diabetes mellitus without complication, without long-term current use of insulin E11.9 250.00   3. Essential hypertension I10 401.9   4. Familial hypercholesterolemia E78.01 272.0   5. Vitamin D deficiency E55.9 268.9     Orders Placed This Encounter   Procedures   • Comprehensive Metabolic Panel   • Lipid Panel   • TSH   • Uric Acid   • C-reactive Protein   • Hepatitis C Antibody   • Vitamin D 25 Hydroxy   • Ambulatory Referral to Ophthalmology   • POC Urinalysis Dipstick, Automated   • POC Glycosylated Hemoglobin (Hb A1C)   • POC Microalbumin     Reviewed use of high risk medication in the elderly: not applicable  Reviewed for potential of harmful drug interactions in the elderly: not applicable    Follow Up:  Return in about 6 months (around 10/3/2017), or if symptoms worsen or fail to improve.     An After Visit Summary and PPPS with all of these plans were given to the patient.        "

## 2017-10-03 ENCOUNTER — OFFICE VISIT (OUTPATIENT)
Dept: FAMILY MEDICINE CLINIC | Facility: CLINIC | Age: 67
End: 2017-10-03

## 2017-10-03 ENCOUNTER — TELEPHONE (OUTPATIENT)
Dept: FAMILY MEDICINE CLINIC | Facility: CLINIC | Age: 67
End: 2017-10-03

## 2017-10-03 VITALS
DIASTOLIC BLOOD PRESSURE: 74 MMHG | HEIGHT: 59 IN | TEMPERATURE: 97.7 F | BODY MASS INDEX: 45.96 KG/M2 | HEART RATE: 72 BPM | SYSTOLIC BLOOD PRESSURE: 124 MMHG | OXYGEN SATURATION: 98 % | RESPIRATION RATE: 20 BRPM | WEIGHT: 228 LBS

## 2017-10-03 DIAGNOSIS — E78.01 FAMILIAL HYPERCHOLESTEROLEMIA: ICD-10-CM

## 2017-10-03 DIAGNOSIS — E11.9 TYPE 2 DIABETES MELLITUS WITHOUT COMPLICATION, WITHOUT LONG-TERM CURRENT USE OF INSULIN (HCC): Primary | ICD-10-CM

## 2017-10-03 DIAGNOSIS — J01.00 ACUTE NON-RECURRENT MAXILLARY SINUSITIS: Primary | ICD-10-CM

## 2017-10-03 DIAGNOSIS — J01.00 ACUTE NON-RECURRENT MAXILLARY SINUSITIS: ICD-10-CM

## 2017-10-03 DIAGNOSIS — I10 ESSENTIAL HYPERTENSION: ICD-10-CM

## 2017-10-03 LAB — HBA1C MFR BLD: 6.9 %

## 2017-10-03 PROCEDURE — 99214 OFFICE O/P EST MOD 30 MIN: CPT | Performed by: FAMILY MEDICINE

## 2017-10-03 PROCEDURE — 83036 HEMOGLOBIN GLYCOSYLATED A1C: CPT | Performed by: FAMILY MEDICINE

## 2017-10-03 RX ORDER — AMOXICILLIN AND CLAVULANATE POTASSIUM 875; 125 MG/1; MG/1
1 TABLET, FILM COATED ORAL 2 TIMES DAILY
Qty: 20 TABLET | Refills: 0 | Status: SHIPPED | OUTPATIENT
Start: 2017-10-03 | End: 2017-10-03 | Stop reason: ALTCHOICE

## 2017-10-03 RX ORDER — LISINOPRIL 20 MG/1
20 TABLET ORAL DAILY
Qty: 90 TABLET | Refills: 3
Start: 2017-10-03 | End: 2018-04-03 | Stop reason: SDUPTHER

## 2017-10-03 RX ORDER — DILTIAZEM HYDROCHLORIDE 240 MG/1
240 CAPSULE, COATED, EXTENDED RELEASE ORAL DAILY
Qty: 90 CAPSULE | Refills: 3
Start: 2017-10-03 | End: 2018-04-03 | Stop reason: SDUPTHER

## 2017-10-03 RX ORDER — SIMVASTATIN 20 MG
20 TABLET ORAL NIGHTLY
Qty: 90 TABLET | Refills: 3
Start: 2017-10-03 | End: 2018-04-03 | Stop reason: SDUPTHER

## 2017-10-03 RX ORDER — GLIPIZIDE 5 MG/1
TABLET ORAL
Qty: 180 TABLET | Refills: 3
Start: 2017-10-03 | End: 2018-04-03 | Stop reason: SDUPTHER

## 2017-10-03 RX ORDER — AMOXICILLIN 500 MG/1
500 CAPSULE ORAL 3 TIMES DAILY
Qty: 30 CAPSULE | Refills: 0 | Status: SHIPPED | OUTPATIENT
Start: 2017-10-03 | End: 2017-10-15

## 2017-10-03 NOTE — TELEPHONE ENCOUNTER
----- Message from Bon Neal MD sent at 10/3/2017  2:56 PM EDT -----  Regarding: RE: ANTIBIOTIC CAUSES UPSET STOMACH AND VOMITING  Contact: [phone]  Script changed as requested.    ----- Message -----     From: Radha Victoria MA     Sent: 10/3/2017   2:18 PM       To: Bon Neal MD  Subject: FW: ANTIBIOTIC CAUSES UPSET STOMACH AND VOMI#        ----- Message -----     From: Nicole Duarte     Sent: 10/3/2017  10:55 AM       To: Radha Victoria MA  Subject: ANTIBIOTIC CAUSES UPSET STOMACH AND VOMITING     YOU PRESCRIBED AUGMENTIN THIS MORNING AND PATIENT CANT TAKE DUE TO RX CAUSES UPSET STOMACH.  SHE CAN TAKE AMOXICILLIN WITHOUT ANY PROBLEMS.  [facility]

## 2017-10-03 NOTE — PROGRESS NOTES
"Subjective   Keisha Padilla is a 67 y.o. female.     History of Present Illness   The patient is here to follow up on her stable chronic medical problems which include DM2, HTN & HLD.  She is tolerating her medications well with no adverse events.  She is needing multiple medication refills today.  She describes good blood sugar and blood pressure control.  Her blood sugars are typically under 140.  She is anticipating HgbA1c evaluation.  Her last dilated diabetic eye exam was November 11, 2016 ([facility]).  She continues with good foot care and protection.    Today the patient also describes greater than one week of sinus congestion not improving with home care.  The patient reports associated sinus pressure with nasal purulence.  The patient is now experiencing a post nasal drip with associated scratchy throat.  There is no fever, chills or acute dyspnea.  She is concerned with a \"sinus infection.\"    She had her flu shot in September at a local pharmacy.  She is due for her pneumovax over the next few weeks.  She is ill today and defers the immunization.    Review of Systems   Constitutional: Negative for chills and fever.   HENT: Positive for congestion and postnasal drip. Negative for ear pain and facial swelling.    Eyes: Negative for discharge.   Respiratory: Positive for cough. Negative for shortness of breath and wheezing.    Cardiovascular: Negative for chest pain, palpitations and leg swelling.   Gastrointestinal: Negative for diarrhea, nausea and vomiting.   Endocrine: Negative for polydipsia, polyphagia and polyuria.   Musculoskeletal: Negative for myalgias.   Skin: Negative for rash.   Neurological: Negative for dizziness.       Objective   Physical Exam   Constitutional: She is oriented to person, place, and time. She appears well-developed and well-nourished.   HENT:   Head: Normocephalic and atraumatic.   Right Ear: Hearing, tympanic membrane, external ear and ear canal normal. "   Left Ear: Hearing, tympanic membrane, external ear and ear canal normal.   Nose: Mucosal edema and rhinorrhea present. Right sinus exhibits maxillary sinus tenderness. Left sinus exhibits maxillary sinus tenderness.   Mouth/Throat: Uvula is midline. Posterior oropharyngeal erythema present.   Eyes: Conjunctivae are normal. Right eye exhibits no discharge. Left eye exhibits no discharge.   Neck: Neck supple.   Cardiovascular: Normal rate, regular rhythm and normal heart sounds.    Pulmonary/Chest: Effort normal and breath sounds normal. She has no wheezes.   Musculoskeletal: Normal range of motion.    Keisha had a diabetic foot exam performed today.   During the foot exam she had a monofilament test performed.    Neurological Sensory Findings - Unaltered hot/cold right ankle/foot discrimination and unaltered hot/cold left ankle/foot discrimination. Unaltered sharp/dull right ankle/foot discrimination and unaltered sharp/dull left ankle/foot discrimination. No right ankle/foot altered proprioception and no left ankle/foot altered proprioception    Vascular Status -  Her exam exhibits right foot vasculature normal. Her exam exhibits no right foot edema. Her exam exhibits left foot vasculature normal. Her exam exhibits no left foot edema.   Skin Integrity  -  Her right foot skin is intact.     Keisha 's left foot skin is intact. .  Lymphadenopathy:     She has no cervical adenopathy.   Neurological: She is alert and oriented to person, place, and time.   Skin: Skin is warm. No rash noted.   Psychiatric: She has a normal mood and affect.   Vitals reviewed.    HgbA1c is 6.9% today    Assessment/Plan   Keisha was seen today for diabetes and nasal congestion.    Diagnoses and all orders for this visit:    Type 2 diabetes mellitus without complication, without long-term current use of insulin  -     POC Glycosylated Hemoglobin (Hb A1C)  -     lisinopril (PRINIVIL,ZESTRIL) 20 MG tablet; Take 1 tablet by mouth Daily.  -      glipiZIDE (GLUCOTROL) 5 MG tablet; Take one po bid  - Routine blood sugar monitoring  - Diabetic diet  - Continue with daily aerobic activity  - We discussed statins and ace inhibitor therapy  - Annual eye exams  - Continue with good foot care and protection    Essential hypertension  -     lisinopril (PRINIVIL,ZESTRIL) 20 MG tablet; Take 1 tablet by mouth Daily.  -     diltiaZEM CD (CARTIA XT) 240 MG 24 hr capsule; Take 1 capsule by mouth Daily.  - Healthy heart diet  - Daily aerobic exercise  - Routine blood pressure monitoring  - [place] Heart Disease and Stroke Prevention Task Force pamphlet.    Familial hypercholesterolemia  -     simvastatin (ZOCOR) 20 MG tablet; Take 1 tablet by mouth Every Night.  - Low cholesterol diet (< 200 mg / day)  - Daily aerobic exercise  - Aspirin 81 mg po once daily    Acute non-recurrent maxillary sinusitis  -     amoxicillin-clavulanate (AUGMENTIN) 875-125 MG per tablet; Take 1 tablet by mouth 2 (Two) Times a Day for 10 days.  -     Chlorcyclizine-Pseudoephed (STAHIST AD) 25-60 MG tablet; Take 1/2 to 1 tab po every 12 hours PRN congestion  - Rest, fluids, avoid sick contacts and RTC if not improved.    RTC in 6 months for medicare annual fasting wellness evaluation or sooner if needed.

## 2017-10-15 ENCOUNTER — OFFICE VISIT (OUTPATIENT)
Dept: FAMILY MEDICINE CLINIC | Facility: CLINIC | Age: 67
End: 2017-10-15

## 2017-10-15 VITALS
TEMPERATURE: 98.3 F | SYSTOLIC BLOOD PRESSURE: 110 MMHG | WEIGHT: 222 LBS | HEIGHT: 58 IN | BODY MASS INDEX: 46.6 KG/M2 | RESPIRATION RATE: 16 BRPM | OXYGEN SATURATION: 98 % | DIASTOLIC BLOOD PRESSURE: 70 MMHG | HEART RATE: 67 BPM

## 2017-10-15 DIAGNOSIS — M54.50 ACUTE RIGHT-SIDED LOW BACK PAIN WITHOUT SCIATICA: Primary | ICD-10-CM

## 2017-10-15 LAB
BASOPHILS # BLD AUTO: 0.01 10*3/MM3 (ref 0–0.2)
BASOPHILS NFR BLD AUTO: 0.1 % (ref 0–1)
BILIRUB BLD-MCNC: ABNORMAL MG/DL
CLARITY, POC: ABNORMAL
COLOR UR: ABNORMAL
DEPRECATED RDW RBC AUTO: 45.3 FL (ref 37–54)
EOSINOPHIL # BLD AUTO: 0.11 10*3/MM3 (ref 0–0.3)
EOSINOPHIL NFR BLD AUTO: 1.3 % (ref 0–3)
ERYTHROCYTE [DISTWIDTH] IN BLOOD BY AUTOMATED COUNT: 13.3 % (ref 11.3–14.5)
GLUCOSE UR STRIP-MCNC: NEGATIVE MG/DL
HCT VFR BLD AUTO: 43.5 % (ref 34.5–44)
HGB BLD-MCNC: 14.3 G/DL (ref 11.5–15.5)
IMM GRANULOCYTES # BLD: 0.04 10*3/MM3 (ref 0–0.03)
IMM GRANULOCYTES NFR BLD: 0.5 % (ref 0–0.6)
KETONES UR QL: NEGATIVE
LEUKOCYTE EST, POC: NEGATIVE
LYMPHOCYTES # BLD AUTO: 1.82 10*3/MM3 (ref 0.6–4.8)
LYMPHOCYTES NFR BLD AUTO: 20.8 % (ref 24–44)
Lab: ABNORMAL
Lab: ABNORMAL
MCH RBC QN AUTO: 30.6 PG (ref 27–31)
MCHC RBC AUTO-ENTMCNC: 32.9 G/DL (ref 32–36)
MCV RBC AUTO: 92.9 FL (ref 80–99)
MONOCYTES # BLD AUTO: 0.53 10*3/MM3 (ref 0–1)
MONOCYTES NFR BLD AUTO: 6.1 % (ref 0–12)
NEUTROPHILS # BLD AUTO: 6.22 10*3/MM3 (ref 1.5–8.3)
NEUTROPHILS NFR BLD AUTO: 71.2 % (ref 41–71)
NITRITE UR-MCNC: NEGATIVE MG/ML
PH UR: 5.5 [PH] (ref 5–8)
PLATELET # BLD AUTO: 218 10*3/MM3 (ref 150–450)
PMV BLD AUTO: 11.9 FL (ref 6–12)
PROT UR STRIP-MCNC: ABNORMAL MG/DL
RBC # BLD AUTO: 4.68 10*6/MM3 (ref 3.89–5.14)
RBC # UR STRIP: NEGATIVE /UL
SP GR UR: 1.03 (ref 1–1.03)
UROBILINOGEN UR QL: NORMAL
WBC NRBC COR # BLD: 8.73 10*3/MM3 (ref 3.5–10.8)

## 2017-10-15 PROCEDURE — 81003 URINALYSIS AUTO W/O SCOPE: CPT | Performed by: NURSE PRACTITIONER

## 2017-10-15 PROCEDURE — 36415 COLL VENOUS BLD VENIPUNCTURE: CPT | Performed by: NURSE PRACTITIONER

## 2017-10-15 PROCEDURE — 99213 OFFICE O/P EST LOW 20 MIN: CPT | Performed by: NURSE PRACTITIONER

## 2017-10-15 PROCEDURE — 85025 COMPLETE CBC W/AUTO DIFF WBC: CPT | Performed by: NURSE PRACTITIONER

## 2017-10-15 RX ORDER — DICLOFENAC SODIUM 75 MG/1
75 TABLET, DELAYED RELEASE ORAL 2 TIMES DAILY
Qty: 60 TABLET | Refills: 0 | Status: SHIPPED | OUTPATIENT
Start: 2017-10-15 | End: 2017-11-14

## 2017-10-15 RX ORDER — TIZANIDINE 4 MG/1
4 TABLET ORAL EVERY 8 HOURS PRN
Qty: 90 TABLET | Refills: 0 | Status: SHIPPED | OUTPATIENT
Start: 2017-10-15 | End: 2017-11-14

## 2017-10-15 NOTE — PATIENT INSTRUCTIONS
Back Pain, Adult  Back pain is very common in adults. The cause of back pain is rarely dangerous and the pain often gets better over time. The cause of your back pain may not be known. Some common causes of back pain include:  · Strain of the muscles or ligaments supporting the spine.  · Wear and tear (degeneration) of the spinal disks.  · Arthritis.  · Direct injury to the back.  For many people, back pain may return. Since back pain is rarely dangerous, most people can learn to manage this condition on their own.  HOME CARE INSTRUCTIONS  Watch your back pain for any changes. The following actions may help to lessen any discomfort you are feeling:  · Remain active. It is stressful on your back to sit or  one place for long periods of time. Do not sit, drive, or  one place for more than 30 minutes at a time. Take short walks on even surfaces as soon as you are able. Try to increase the length of time you walk each day.  · Exercise regularly as directed by your health care provider. Exercise helps your back heal faster. It also helps avoid future injury by keeping your muscles strong and flexible.  · Do not stay in bed. Resting more than 1-2 days can delay your recovery.  · Pay attention to your body when you bend and lift. The most comfortable positions are those that put less stress on your recovering back. Always use proper lifting techniques, including:    Bending your knees.    Keeping the load close to your body.    Avoiding twisting.  · Find a comfortable position to sleep. Use a firm mattress and lie on your side with your knees slightly bent. If you lie on your back, put a pillow under your knees.  · Avoid feeling anxious or stressed. Stress increases muscle tension and can worsen back pain. It is important to recognize when you are anxious or stressed and learn ways to manage it, such as with exercise.  · Take medicines only as directed by your health care provider. Over-the-counter  medicines to reduce pain and inflammation are often the most helpful. Your health care provider may prescribe muscle relaxant drugs. These medicines help dull your pain so you can more quickly return to your normal activities and healthy exercise.  · Apply ice to the injured area:    Put ice in a plastic bag.    Place a towel between your skin and the bag.    Leave the ice on for 20 minutes, 2-3 times a day for the first 2-3 days. After that, ice and heat may be alternated to reduce pain and spasms.  · Maintain a healthy weight. Excess weight puts extra stress on your back and makes it difficult to maintain good posture.  SEEK MEDICAL CARE IF:  · You have pain that is not relieved with rest or medicine.  · You have increasing pain going down into the legs or buttocks.  · You have pain that does not improve in one week.  · You have night pain.  · You lose weight.  · You have a fever or chills.  SEEK IMMEDIATE MEDICAL CARE IF:   · You develop new bowel or bladder control problems.  · You have unusual weakness or numbness in your arms or legs.  · You develop nausea or vomiting.  · You develop abdominal pain.  · You feel faint.     This information is not intended to replace advice given to you by your health care provider. Make sure you discuss any questions you have with your health care provider.     Document Released: 12/18/2006 Document Revised: 01/08/2016 Document Reviewed: 04/21/2015  [employer] Interactive Patient Education ©2017 [employer]  Follow up with Dr. Neal as needed.

## 2017-10-15 NOTE — PROGRESS NOTES
Subjective   Keisha Padilla is a 67 y.o. female.   Chief Complaint   Patient presents with   • Back Pain      Back Pain   The current episode started 1 to 4 weeks ago (started 9 days agol ). The problem has been gradually worsening since onset. The pain is present in the gluteal. The quality of the pain is described as shooting. The pain does not radiate. The pain is at a severity of 4/10. The pain is mild. The pain is the same all the time. The symptoms are aggravated by position, sitting, twisting and bending. Stiffness is present in the morning. Pertinent negatives include no abdominal pain, bladder incontinence, bowel incontinence, chest pain, dysuria, fever, headaches, leg pain, numbness, paresis, paresthesias, pelvic pain, perianal numbness, tingling or weakness. Risk factors include obesity, poor posture and sedentary lifestyle (walks 4000 steps a day). She has tried bed rest, heat, muscle relaxant and NSAIDs for the symptoms. The treatment provided mild relief.    Patient reports her back has been hurting x 9 days.   She reports she did not hear any type of pop. She reported she went to sit down in a chair to sew a quilt and it suddenly started hurting as she stood up out of the chair.   She reports applying heat and taking diclofenac, and a muscle relaxer. And occasionally a pain patch - icy hot.   She is here with her . She walked in alone. No cane.         The following portions of the patient's history were reviewed and updated as appropriate: allergies, current medications, past family history, past medical history, past social history, past surgical history and problem list.    Review of Systems   Constitutional: Negative.  Negative for chills and fever.   Eyes: Positive for discharge and itching.   Cardiovascular: Negative for chest pain.   Gastrointestinal: Negative for abdominal pain and bowel incontinence.        No incontinence of stool    Genitourinary: Negative for bladder incontinence,  "dysuria and pelvic pain.        No urinary incontinence.      Musculoskeletal: Positive for back pain and myalgias.        Right side back pain.   Tenderness - if feels good to have someone to deep rub it.   He spouse is with her and reported he had to rub it really hard with a wooden ball roller.   She denies any fever, chill, drainage, denies numbness, tingling. She stated the pain does not radiate.      Neurological: Negative for tingling, weakness, numbness, headaches and paresthesias.     Visit Vitals   • /70 (BP Location: Left arm, Patient Position: Sitting, Cuff Size: Adult)   • Pulse 67   • Temp 98.3 °F (36.8 °C) (Oral)   • Resp 16   • Ht 58\" (147.3 cm)   • Wt 222 lb (101 kg)   • SpO2 98%   • BMI 46.4 kg/m2       Objective   Allergies   Allergen Reactions   • Sulfa Antibiotics      Mother and daughter are allergic to it, she just avoids it       Physical Exam   Constitutional: She is oriented to person, place, and time. She appears well-developed and well-nourished.   HENT:   Head: Normocephalic.   Eyes: Pupils are equal, round, and reactive to light.   Cardiovascular: Normal rate, regular rhythm and normal heart sounds.    Pulmonary/Chest: Effort normal and breath sounds normal.   Abdominal: Soft. Bowel sounds are normal.   Musculoskeletal: She exhibits tenderness.        Arms:  Pain to the right lower back -      Lymphadenopathy:     She has no cervical adenopathy.   Neurological: She is alert and oriented to person, place, and time.   Skin: Skin is warm and dry.   Psychiatric: She has a normal mood and affect. Her behavior is normal. Thought content normal.   Vitals reviewed.      Assessment/Plan   Keisha was seen today for back pain.    Diagnoses and all orders for this visit:    Acute right-sided low back pain without sciatica  -     Ambulatory Referral to Physical Therapy Evaluate and treat  -     tiZANidine (ZANAFLEX) 4 MG tablet; Take 1 tablet by mouth Every 8 (Eight) Hours As Needed for Muscle " Spasms for up to 30 days.  -     POC Urinalysis Dipstick, Automated  -     CBC & Differential  -     CBC Auto Differential  -     diclofenac (VOLTAREN) 75 MG EC tablet; Take 1 tablet by mouth 2 (Two) Times a Day for 30 days.          See patient instructions.   Follow up with Dr. Neal if your symptoms fail to improve or worsen.         Karina Espino, APRN

## 2017-12-14 ENCOUNTER — TRANSCRIBE ORDERS (OUTPATIENT)
Dept: MAMMOGRAPHY | Facility: HOSPITAL | Age: 67
End: 2017-12-14

## 2017-12-14 ENCOUNTER — HOSPITAL ENCOUNTER (OUTPATIENT)
Dept: MAMMOGRAPHY | Facility: HOSPITAL | Age: 67
Discharge: HOME | End: 2017-12-14
Admitting: NURSE PRACTITIONER

## 2017-12-14 DIAGNOSIS — R92.8 ABNORMAL MAMMOGRAM: Primary | ICD-10-CM

## 2017-12-14 DIAGNOSIS — R92.8 ABNORMAL MAMMOGRAM: ICD-10-CM

## 2017-12-14 PROCEDURE — G0279 TOMOSYNTHESIS, MAMMO: HCPCS

## 2017-12-14 PROCEDURE — G0204 DX MAMMO INCL CAD BI: HCPCS

## 2017-12-14 PROCEDURE — G0279 TOMOSYNTHESIS, MAMMO: HCPCS | Performed by: RADIOLOGY

## 2017-12-14 PROCEDURE — G0204 DX MAMMO INCL CAD BI: HCPCS | Performed by: RADIOLOGY

## 2018-01-22 ENCOUNTER — OFFICE VISIT (OUTPATIENT)
Dept: GYNECOLOGIC ONCOLOGY | Facility: CLINIC | Age: 68
End: 2018-01-22

## 2018-01-22 VITALS
DIASTOLIC BLOOD PRESSURE: 74 MMHG | TEMPERATURE: 97.9 F | HEART RATE: 75 BPM | OXYGEN SATURATION: 97 % | SYSTOLIC BLOOD PRESSURE: 160 MMHG | BODY MASS INDEX: 48.54 KG/M2 | WEIGHT: 225 LBS | RESPIRATION RATE: 16 BRPM | HEIGHT: 57 IN

## 2018-01-22 DIAGNOSIS — C54.1 ENDOMETRIAL CANCER (HCC): Primary | ICD-10-CM

## 2018-01-22 PROCEDURE — 99213 OFFICE O/P EST LOW 20 MIN: CPT | Performed by: NURSE PRACTITIONER

## 2018-01-22 NOTE — PROGRESS NOTES
GYN ONCOLOGY CANCER SURVEILLANCE FOLLOW-UP    Keisha Padilla  [id number]  1950    Chief Complaint: Follow-up (no complaints)        History of present illness:  Keisha Padilla is a 67 y.o. year old female who is here today for ongoing surveillance of Endometrial Cancer, see Cancer History. She reports she is feeling very well today and has no complaints. She denies vaginal bleeding, pelvic pain, and changes in bowel or bladder function. She is now having her annual exams through her PCP office and will be seeing us for ongoing cancer surveillance only due to insurance changes.           Cancer History:      Endometrial cancer    2014 Surgery     TVH/LSO for invasive endometrial cancer, [id number] grade 1            Past Medical History:   Diagnosis Date   • Asthma    • Chronic venous insufficiency    • Contusion of breast, left 2016   • Diabetes mellitus    • Endometrial cancer 2017   • Hyperlipidemia    • Hypertension    • Morbid obesity    • Osteoarthritis of knee    • Varicose veins of legs    • Vitamin D deficiency        Past Surgical History:   Procedure Laterality Date   •  SECTION     • COLONOSCOPY     • HEMORRHOIDECTOMY     • HYSTERECTOMY     • TONSILLECTOMY AND ADENOIDECTOMY         MEDICATIONS: The current medication list was reviewed and reconciled.     Allergies:  is allergic to sulfa antibiotics.    Family History   Problem Relation Age of Onset   • Pancreatitis Mother    • Alzheimer's disease Father    • Breast cancer Neg Hx    • Ovarian cancer Neg Hx        Review of Systems   Constitutional: Negative for appetite change, chills, fatigue, fever and unexpected weight change.   Respiratory: Negative for cough, shortness of breath and wheezing.    Cardiovascular: Negative for chest pain, palpitations and leg swelling.   Gastrointestinal: Negative for abdominal distention, abdominal pain, blood in stool, constipation, diarrhea, nausea and vomiting.   Endocrine:  "Negative.    Genitourinary: Negative for dyspareunia, dysuria, frequency, genital sores, hematuria, pelvic pain, urgency, vaginal bleeding, vaginal discharge and vaginal pain.   Musculoskeletal: Negative for arthralgias, gait problem and joint swelling.   Neurological: Negative for dizziness, seizures, syncope, weakness, light-headedness, numbness and headaches.   Hematological: Negative for adenopathy.   Psychiatric/Behavioral: Negative.        Physical Exam  Vital Signs: /74  Pulse 75  Temp 97.9 °F (36.6 °C) (Temporal Artery )   Resp 16  Ht 144.8 cm (57\")  Wt 102 kg (225 lb)  SpO2 97%  BMI 48.69 kg/m2   General Appearance:  alert, cooperative, no apparent distress, appears stated age and obese   Neurologic/Psychiatric: A&O x 3, gait steady, appropriate affect   HEENT:  Normocephalic, without obvious abnormality, mucous membranes moist   Neck: Supple, symmetrical, trachea midline, no adenopathy;  No thyromegaly, masses, or tenderness   Back:   Symmetric, no curvature, ROM normal, no CVA tenderness   Lungs:   Clear to auscultation bilaterally; respirations regular, even, and unlabored bilaterally   Heart:  Regular rate and rhythm, no murmurs appreciated   Breasts:  deferred   Abdomen:   Soft, non-tender, non-distended, no organomegaly and Exam limited d/t habitus.   Lymph nodes: No cervical, supraclavicular, inguinal or axillary adenopathy noted   Extremities: Normal, atraumatic; no clubbing, cyanosis, or edema    Pelvic: External Genitalia  without lesions or skin changes  Vagina  is pink, moist, without lesions.   Vaginal Cuff  Female Vaginal Cuff: smooth, intact, without visible lesions and pap obtained  Uterus  surgically absent and no palpable masses  Ovaries  surgically absent bilaterallly and without palpable masses or fullness  Parametria  smooth  Rectovaginal  Female rectovaginal: deferred     ECOG Performance Status: 0 - Asymptomatic    Procedure Note:  No notes on file      Assessment and " Plan:  Keisha was seen today for follow-up.    Diagnoses and all orders for this visit:    Endometrial cancer        There is no evidence of disease upon today's exam. She is understanding to call with any changes in pelvic symptoms or general GYN concerns.     Continue breast exams, rectal exams, and routine well woman screenings through PCP office. We will continue to see her for q6 month cancer surveillance until the 5 year kristen with periodic pap smears. Call in 1 week for pap smear results.       Return in about 6 months (around 7/22/2018) for ongoing cancer surveillance.      Leonor Nunn, PRIMITIVO        Note: Speech recognition transcription software was used to dictate portions of this document.  An attempt at proofreading has been made though minor errors in transcription may still be present.  Please do not hesitate to call our office with any questions.

## 2018-04-03 ENCOUNTER — OFFICE VISIT (OUTPATIENT)
Dept: FAMILY MEDICINE CLINIC | Facility: CLINIC | Age: 68
End: 2018-04-03

## 2018-04-03 VITALS
BODY MASS INDEX: 48.54 KG/M2 | SYSTOLIC BLOOD PRESSURE: 126 MMHG | OXYGEN SATURATION: 97 % | HEIGHT: 57 IN | DIASTOLIC BLOOD PRESSURE: 70 MMHG | RESPIRATION RATE: 16 BRPM | WEIGHT: 225 LBS | HEART RATE: 84 BPM

## 2018-04-03 DIAGNOSIS — E11.9 TYPE 2 DIABETES MELLITUS WITHOUT COMPLICATION, WITHOUT LONG-TERM CURRENT USE OF INSULIN (HCC): ICD-10-CM

## 2018-04-03 DIAGNOSIS — Z00.00 MEDICARE ANNUAL WELLNESS VISIT, SUBSEQUENT: Primary | ICD-10-CM

## 2018-04-03 DIAGNOSIS — E78.01 FAMILIAL HYPERCHOLESTEROLEMIA: ICD-10-CM

## 2018-04-03 DIAGNOSIS — I10 ESSENTIAL HYPERTENSION: ICD-10-CM

## 2018-04-03 DIAGNOSIS — E55.9 VITAMIN D DEFICIENCY: ICD-10-CM

## 2018-04-03 DIAGNOSIS — M17.0 PRIMARY OSTEOARTHRITIS OF BOTH KNEES: ICD-10-CM

## 2018-04-03 LAB
25(OH)D3 SERPL-MCNC: 35.9 NG/ML
ALBUMIN SERPL-MCNC: 4 G/DL (ref 3.2–4.8)
ALBUMIN/GLOB SERPL: 1.8 G/DL (ref 1.5–2.5)
ALP SERPL-CCNC: 103 U/L (ref 25–100)
ALT SERPL W P-5'-P-CCNC: 26 U/L (ref 7–40)
ANION GAP SERPL CALCULATED.3IONS-SCNC: 10 MMOL/L (ref 3–11)
ARTICHOKE IGE QN: 68 MG/DL (ref 0–130)
AST SERPL-CCNC: 25 U/L (ref 0–33)
BASOPHILS # BLD AUTO: 0.02 10*3/MM3 (ref 0–0.2)
BASOPHILS NFR BLD AUTO: 0.3 % (ref 0–1)
BILIRUB BLD-MCNC: NEGATIVE MG/DL
BILIRUB SERPL-MCNC: 0.4 MG/DL (ref 0.3–1.2)
BUN BLD-MCNC: 17 MG/DL (ref 9–23)
BUN/CREAT SERPL: 21.3 (ref 7–25)
CALCIUM SPEC-SCNC: 8.9 MG/DL (ref 8.7–10.4)
CHLORIDE SERPL-SCNC: 104 MMOL/L (ref 99–109)
CHOLEST SERPL-MCNC: 141 MG/DL (ref 0–200)
CLARITY, POC: CLEAR
CO2 SERPL-SCNC: 26 MMOL/L (ref 20–31)
COLOR UR: YELLOW
CREAT BLD-MCNC: 0.8 MG/DL (ref 0.6–1.3)
CRP SERPL-MCNC: 1.44 MG/DL (ref 0–1)
DEPRECATED RDW RBC AUTO: 48.1 FL (ref 37–54)
EOSINOPHIL # BLD AUTO: 0.18 10*3/MM3 (ref 0–0.3)
EOSINOPHIL NFR BLD AUTO: 2.9 % (ref 0–3)
ERYTHROCYTE [DISTWIDTH] IN BLOOD BY AUTOMATED COUNT: 14.1 % (ref 11.3–14.5)
GFR SERPL CREATININE-BSD FRML MDRD: 71 ML/MIN/1.73
GLOBULIN UR ELPH-MCNC: 2.2 GM/DL
GLUCOSE BLD-MCNC: 100 MG/DL (ref 70–100)
GLUCOSE UR STRIP-MCNC: NEGATIVE MG/DL
HBA1C MFR BLD: 7.5 % (ref 4.8–5.6)
HCT VFR BLD AUTO: 43.1 % (ref 34.5–44)
HDLC SERPL-MCNC: 48 MG/DL (ref 40–60)
HGB BLD-MCNC: 14 G/DL (ref 11.5–15.5)
IMM GRANULOCYTES # BLD: 0.04 10*3/MM3 (ref 0–0.03)
IMM GRANULOCYTES NFR BLD: 0.6 % (ref 0–0.6)
KETONES UR QL: NEGATIVE
LEUKOCYTE EST, POC: NEGATIVE
LYMPHOCYTES # BLD AUTO: 1.17 10*3/MM3 (ref 0.6–4.8)
LYMPHOCYTES NFR BLD AUTO: 18.7 % (ref 24–44)
MCH RBC QN AUTO: 30.4 PG (ref 27–31)
MCHC RBC AUTO-ENTMCNC: 32.5 G/DL (ref 32–36)
MCV RBC AUTO: 93.7 FL (ref 80–99)
MONOCYTES # BLD AUTO: 0.58 10*3/MM3 (ref 0–1)
MONOCYTES NFR BLD AUTO: 9.3 % (ref 0–12)
NEUTROPHILS # BLD AUTO: 4.27 10*3/MM3 (ref 1.5–8.3)
NEUTROPHILS NFR BLD AUTO: 68.2 % (ref 41–71)
NITRITE UR-MCNC: NEGATIVE MG/ML
PH UR: 6 [PH] (ref 5–8)
PLATELET # BLD AUTO: 195 10*3/MM3 (ref 150–450)
PMV BLD AUTO: 12.1 FL (ref 6–12)
POC CREATININE URINE: 100
POC MICROALBUMIN URINE: 10
POTASSIUM BLD-SCNC: 4.7 MMOL/L (ref 3.5–5.5)
PROT SERPL-MCNC: 6.2 G/DL (ref 5.7–8.2)
PROT UR STRIP-MCNC: NEGATIVE MG/DL
RBC # BLD AUTO: 4.6 10*6/MM3 (ref 3.89–5.14)
RBC # UR STRIP: NEGATIVE /UL
SODIUM BLD-SCNC: 140 MMOL/L (ref 132–146)
SP GR UR: 1 (ref 1–1.03)
T4 FREE SERPL-MCNC: 1.07 NG/DL (ref 0.89–1.76)
TRIGL SERPL-MCNC: 117 MG/DL (ref 0–150)
TSH SERPL DL<=0.05 MIU/L-ACNC: 1.75 MIU/ML (ref 0.35–5.35)
URATE SERPL-MCNC: 6.3 MG/DL (ref 3.1–7.8)
UROBILINOGEN UR QL: NORMAL
WBC NRBC COR # BLD: 6.26 10*3/MM3 (ref 3.5–10.8)

## 2018-04-03 PROCEDURE — 82306 VITAMIN D 25 HYDROXY: CPT | Performed by: FAMILY MEDICINE

## 2018-04-03 PROCEDURE — G0439 PPPS, SUBSEQ VISIT: HCPCS | Performed by: FAMILY MEDICINE

## 2018-04-03 PROCEDURE — 81003 URINALYSIS AUTO W/O SCOPE: CPT | Performed by: FAMILY MEDICINE

## 2018-04-03 PROCEDURE — 80061 LIPID PANEL: CPT | Performed by: FAMILY MEDICINE

## 2018-04-03 PROCEDURE — 83036 HEMOGLOBIN GLYCOSYLATED A1C: CPT | Performed by: FAMILY MEDICINE

## 2018-04-03 PROCEDURE — 85025 COMPLETE CBC W/AUTO DIFF WBC: CPT | Performed by: FAMILY MEDICINE

## 2018-04-03 PROCEDURE — 80053 COMPREHEN METABOLIC PANEL: CPT | Performed by: FAMILY MEDICINE

## 2018-04-03 PROCEDURE — 36415 COLL VENOUS BLD VENIPUNCTURE: CPT | Performed by: FAMILY MEDICINE

## 2018-04-03 PROCEDURE — 84443 ASSAY THYROID STIM HORMONE: CPT | Performed by: FAMILY MEDICINE

## 2018-04-03 PROCEDURE — 86140 C-REACTIVE PROTEIN: CPT | Performed by: FAMILY MEDICINE

## 2018-04-03 PROCEDURE — 82044 UR ALBUMIN SEMIQUANTITATIVE: CPT | Performed by: FAMILY MEDICINE

## 2018-04-03 PROCEDURE — 84550 ASSAY OF BLOOD/URIC ACID: CPT | Performed by: FAMILY MEDICINE

## 2018-04-03 PROCEDURE — 84439 ASSAY OF FREE THYROXINE: CPT | Performed by: FAMILY MEDICINE

## 2018-04-03 RX ORDER — DILTIAZEM HYDROCHLORIDE 240 MG/1
240 CAPSULE, COATED, EXTENDED RELEASE ORAL DAILY
Qty: 90 CAPSULE | Refills: 3 | Status: SHIPPED | OUTPATIENT
Start: 2018-04-03 | End: 2019-04-11 | Stop reason: SDUPTHER

## 2018-04-03 RX ORDER — GLIPIZIDE 5 MG/1
TABLET ORAL
Qty: 180 TABLET | Refills: 3 | Status: SHIPPED | OUTPATIENT
Start: 2018-04-03 | End: 2019-04-11 | Stop reason: SDUPTHER

## 2018-04-03 RX ORDER — DICLOFENAC SODIUM 75 MG/1
75 TABLET, DELAYED RELEASE ORAL DAILY PRN
Qty: 90 TABLET | Refills: 3 | Status: SHIPPED | OUTPATIENT
Start: 2018-04-03 | End: 2019-04-11 | Stop reason: SDUPTHER

## 2018-04-03 RX ORDER — SIMVASTATIN 20 MG
20 TABLET ORAL NIGHTLY
Qty: 90 TABLET | Refills: 3 | Status: SHIPPED | OUTPATIENT
Start: 2018-04-03 | End: 2019-04-11 | Stop reason: SDUPTHER

## 2018-04-03 RX ORDER — LISINOPRIL 20 MG/1
20 TABLET ORAL DAILY
Qty: 90 TABLET | Refills: 3 | Status: SHIPPED | OUTPATIENT
Start: 2018-04-03 | End: 2019-04-11 | Stop reason: SDUPTHER

## 2018-04-03 NOTE — PROGRESS NOTES
The ABCs of the Annual Wellness Visit    HEALTH RISK ASSESSMENT  SUBSEQUENT Medicare Wellness Visit   1950    Recent Hospitalizations: NONE    Current Medical Providers: Patient Care Team:  Bon Neal MD as PCP - Family Medicine  Bon Neal MD as PCP - Claims Attributed    Smoking Status   History   Smoking Status   • Never Smoker   Smokeless Tobacco   • Never Used     Alcohol Consumption   History   Alcohol Use No     Depression Screen   PHQ-2 Depression Screening  Little interest or pleasure in doing things? 0   Feeling down, depressed, or hopeless? 0   PHQ-2 Total Score 0      Fall Risk Assessment  Fallen in past 6 months: 0--> No  Mental Status: 0--> no mental status change  Mobility: 0--> No mobility issues  Medications: 0--> No meds  Total Fall Risk Score: 2    Does the patient have evidence of cognitive impairment? No  Aspirin use counseling: Aspirin 81 mg po once daily advised.    Recent Lab Results:  Lab Results   Component Value Date    BUN 19 04/03/2017    CREATININE 0.80 04/03/2017    EGFRIFNONA 71 04/03/2017    BCR 23.8 04/03/2017     04/03/2017    K 4.5 04/03/2017    CO2 27.0 04/03/2017    CALCIUM 9.7 04/03/2017    ALBUMIN 4.20 04/03/2017    LABIL2 1.6 04/03/2017    BILITOT 0.5 04/03/2017    ALKPHOS 119 (H) 04/03/2017    AST 29 04/03/2017    ALT 30 04/03/2017       Lab Results   Component Value Date    CHLPL 175 10/05/2015    TRIG 150 04/03/2017    HDL 52 04/03/2017       Lab Results   Component Value Date    HGBA1C 6.9 10/03/2017       Subjective     History of Present Illness  Keisha Padilla is a 68 y.o. female who presents for an Subsequent Wellness Visit.  She is also here to follow up on her stable chronic medical problems which include DM2, HTN, OA knees & HLD.  She is tolerating her medications well with no adverse events.  She is needing multiple medication refills today.  She describes good blood sugar and blood pressure control.  Her blood sugars are typically under 140.  She  "is anticipating HgbA1c evaluation.  Her last dilated diabetic eye exam was November 11, 2017 ([facility]).  She continues with good foot care and protection.    Review of Systems   Constitutional: Negative.    HENT: Negative.    Eyes: Negative.    Respiratory: Negative.    Cardiovascular: Negative.    Gastrointestinal: Negative.    Endocrine: Negative.    Genitourinary: Negative.    Musculoskeletal: Positive for back pain.   Skin: Negative.    Allergic/Immunologic: Negative.    Neurological: Negative.    Hematological: Negative.    Psychiatric/Behavioral: Negative.        The following portions of the patient's history were reviewed and updated as appropriate: past medical history, past surgical history, allergies, current medications, past family history and social history.     Medication Sig   • aspirin 81 MG tablet Take 1 tablet by mouth Daily.   • budesonide-formoterol 160-4.5 MCG/ACT inhaler Inhale 2 puffs 2 (two) times a day.   • STAHIST AD 25-60 MG tablet Take 1/2 to 1 tab po every 12 hours PRN congestion   • Cholecalciferol (VITAMIN D3) 2000 UNITS capsule Take 1 capsule by mouth daily.   • DICLOFENAC PO Take 1 tablet/day by mouth As Needed.   • diltiaZEM CD (CARTIA XT) 240 MG 24 hr capsule Take 1 capsule by mouth Daily.   • glipiZIDE (GLUCOTROL) 5 MG tablet Take one po bid   • lisinopril (PRINIVIL,ZESTRIL) 20 MG tablet Take 1 tablet by mouth Daily.   • simvastatin (ZOCOR) 20 MG tablet Take 1 tablet by mouth Every Night.   • dicyclomine (BENTYL) 10 MG capsule Take 1 capsule by mouth 3 (Three) Times a Day As Needed (nausea).     No facility-administered medications prior to visit.        Objective     Visual Acuity    Visual Acuity Screening    Right eye Left eye Both eyes   Without correction:      With correction: 20/20 20/20 20/20     Vitals:    04/03/18 0814   BP: 126/70   Pulse: 84   Resp: 16   SpO2: 97%   Weight: 102 kg (225 lb)   Height: 144.8 cm (57\")   PainSc: 0-No pain     Body mass " index is 48.69 kg/m². Discussed the patient's BMI with her. The BMI is above average; BMI management plan is completed.    Physical Exam   Constitutional: She is oriented to person, place, and time. She appears well-developed and well-nourished. She is cooperative.   HENT:   Head: Normocephalic and atraumatic.   Right Ear: Hearing and external ear normal.   Left Ear: Hearing and external ear normal.   Nose: Nose normal.   Mouth/Throat: Uvula is midline, oropharynx is clear and moist and mucous membranes are normal.   Eyes: Conjunctivae and EOM are normal. Pupils are equal, round, and reactive to light. No scleral icterus.   Neck: Trachea normal and normal range of motion. Neck supple. No JVD present. Carotid bruit is not present. No thyromegaly present.   Cardiovascular: Normal rate, regular rhythm, normal heart sounds and intact distal pulses.    Pulmonary/Chest: Effort normal and breath sounds normal.   Abdominal: Soft. Bowel sounds are normal. There is no hepatosplenomegaly. There is no tenderness.   Musculoskeletal: Normal range of motion.        Right knee: She exhibits deformity.        Left knee: She exhibits deformity.    Keisha had a diabetic foot exam performed today.   During the foot exam she had a monofilament test performed.    Neurological Sensory Findings - Unaltered hot/cold right ankle/foot discrimination and unaltered hot/cold left ankle/foot discrimination. Unaltered sharp/dull right ankle/foot discrimination and unaltered sharp/dull left ankle/foot discrimination. No right ankle/foot altered proprioception and no left ankle/foot altered proprioception  Vascular Status -  Her right foot exhibits normal foot vasculature  and no edema. Her left foot exhibits normal foot vasculature  and no edema.  Skin Integrity  -  Her right foot skin is intact.Her left foot skin is intact..  Lymphadenopathy:     She has no cervical adenopathy.   Neurological: She is alert and oriented to person, place, and time.  She has normal strength and normal reflexes. No sensory deficit. Gait normal.   Skin: Skin is warm and dry.   Psychiatric: She has a normal mood and affect. Her speech is normal and behavior is normal. Judgment and thought content normal. Cognition and memory are normal.   Nursing note and vitals reviewed.    Compared to one year ago, the patient feels her physical health is the same.  Compared to one year ago, the patient feels her mental health is the same.    Age-appropriate Screening Schedule  Refer to the list below for future screening recommendations based on patient's age, sex and/or medical conditions. Orders for these recommended tests are listed in the plan section. The patient has been provided with a written plan.    Health Maintenance   Topic Date Due   • HEMOGLOBIN A1C  10/03/2018   • DIABETIC EYE EXAM  11/13/2018   • DIABETIC FOOT EXAM  04/03/2019   • LIPID PANEL  04/03/2019   • URINE MICROALBUMIN  04/03/2019   • DXA SCAN  04/03/2019   • MAMMOGRAM  12/14/2019   • TDAP/TD VACCINES (2 - Td) 10/03/2026   • COLONOSCOPY  02/22/2028   • INFLUENZA VACCINE  Completed   • PNEUMOCOCCAL VACCINES (65+ LOW/MEDIUM RISK)  Completed   • ZOSTER VACCINE  Addressed     Advance Care Planning  We discussed advance care planning and importance of providing & updating documentation for the medical record.    Identification of Risk Factors  Risk factors include: weight , cardiovascular risk, inactivity, increased fall risk and caretaker stress.    Assessment/Plan   Patient Self-Management and Personalized Health Advice  The patient has been provided with information about: diet, exercise, weight management, prevention of cardiac or vascular disease, fall prevention and designing advance directives and preventive services including:   · Advance directive, Counseling for cardiovascular disease risk reduction, Diabetes screening, see lab orders, Exercise counseling provided, Fall Risk assessment done, Glaucoma screening  recommended, Nutrition counseling provided.    Visit Diagnoses:    ICD-10-CM ICD-9-CM   1. Medicare annual wellness visit, subsequent Z00.00 V70.0   2. Type 2 diabetes mellitus without complication, without long-term current use of insulin E11.9 250.00   3. Essential hypertension I10 401.9   4. Familial hypercholesterolemia E78.01 272.0   5. Primary osteoarthritis of both knees M17.0 715.16   6. Vitamin D deficiency  E55.9 268.9       Orders Placed This Encounter   Procedures   • Comprehensive Metabolic Panel   • Lipid Panel   • TSH   • T4, Free   • Uric Acid   • C-reactive Protein   • Hemoglobin A1c   • Vitamin D 25 Hydroxy   • CBC Auto Differential   • POC Urinalysis Dipstick, Automated   • POC Microalbumin   • CBC & Differential       Current Outpatient Prescriptions:   •  aspirin 81 MG tablet, Take 1 tablet by mouth Daily., Disp: 90 tablet, Rfl: 3  •  budesonide-formoterol 160-4.5 MCG/ACT inhaler, Inhale 2 puffs 2 (two) times a day., Disp: 1 inhaler, Rfl: 0  •  STAHIST AD 25-60 MG tablet, Take 1/2 to 1 tab po every 12 hours PRN congestion, Disp: 30 tablet, Rfl: 0  •  Cholecalciferol (VITAMIN D3) 2000 UNITS capsule, Take 1 capsule by mouth daily., Disp: , Rfl:   •  DICLOFENAC PO, Take 1 tablet/day by mouth As Needed., Disp: , Rfl:   •  diltiaZEM  MG 24 hr capsule, Take 1 capsule by mouth Daily., Disp: 90 capsule, Rfl: 3  •  glipiZIDE 5 MG tablet, Take one po bid, Disp: 180 tablet, Rfl: 3  •  Lisinopril 20 MG tablet, Take 1 tablet by mouth Daily., Disp: 90 tablet, Rfl: 3  •  simvastatin 20 MG tablet, Take 1 tablet by mouth Every Night., Disp: 90 tablet, Rfl: 3    Current outpatient and discharge medications have been reconciled for the patient.  Bon Neal MD      Reviewed use of high risk medication in the elderly: Not applicable.  Reviewed for potential of harmful drug interactions in the elderly: Not applicable.    Follow Up:  Return in about 6 months (around 10/3/2018), or if symptoms worsen or fail  to improve.     An After Visit Summary and PPPS with all of these plans were given to the patient.         Bon Neal MD 04/03/18 8:23 AM

## 2018-04-23 ENCOUNTER — HOSPITAL ENCOUNTER (OUTPATIENT)
Dept: LAB | Age: 68
Discharge: HOME | End: 2018-04-23
Payer: MEDICARE

## 2018-04-23 ENCOUNTER — HOSPITAL ENCOUNTER (OUTPATIENT)
Dept: PREADMISSION TESTING | Age: 68
Discharge: HOME | End: 2018-04-23
Payer: MEDICARE

## 2018-04-23 DIAGNOSIS — Z01.818 PRE-OP TESTING: ICD-10-CM

## 2018-04-23 LAB
ALBUMIN SERPL-MCNC: 4 G/DL (ref 3.4–5)
ALBUMIN/GLOB SERPL: 1.3 {RATIO} (ref 0.8–1.7)
ALP SERPL-CCNC: 63 U/L (ref 45–117)
ALT SERPL-CCNC: 31 U/L (ref 13–56)
ANION GAP SERPL CALC-SCNC: 5 MMOL/L (ref 3–18)
AST SERPL-CCNC: 27 U/L (ref 15–37)
BASOPHILS # BLD: ABNORMAL K/UL
BASOPHILS NFR BLD: ABNORMAL %
BILIRUB SERPL-MCNC: 0.4 MG/DL (ref 0.2–1)
BUN SERPL-MCNC: 20 MG/DL (ref 7–18)
BUN/CREAT SERPL: 21 (ref 12–20)
CALCIUM SERPL-MCNC: 8.9 MG/DL (ref 8.5–10.1)
CHLORIDE SERPL-SCNC: 104 MMOL/L (ref 100–108)
CO2 SERPL-SCNC: 32 MMOL/L (ref 21–32)
CREAT SERPL-MCNC: 0.96 MG/DL (ref 0.6–1.3)
DIFFERENTIAL METHOD BLD: ABNORMAL
EOSINOPHIL # BLD: ABNORMAL K/UL
EOSINOPHIL NFR BLD: ABNORMAL %
ERYTHROCYTE [DISTWIDTH] IN BLOOD BY AUTOMATED COUNT: 12.7 % (ref 11.6–14.5)
GLOBULIN SER CALC-MCNC: 3 G/DL (ref 2–4)
GLUCOSE SERPL-MCNC: 109 MG/DL (ref 74–99)
HCT VFR BLD AUTO: 38.8 % (ref 35–45)
HGB BLD-MCNC: 12.8 G/DL (ref 12–16)
LYMPHOCYTES # BLD: ABNORMAL K/UL
LYMPHOCYTES NFR BLD: ABNORMAL %
MCH RBC QN AUTO: 31.4 PG (ref 24–34)
MCHC RBC AUTO-ENTMCNC: 33 G/DL (ref 31–37)
MCV RBC AUTO: 95.3 FL (ref 74–97)
MONOCYTES # BLD: ABNORMAL K/UL
MONOCYTES NFR BLD: ABNORMAL %
NEUTS SEG # BLD: ABNORMAL K/UL
NEUTS SEG NFR BLD: ABNORMAL %
PLATELET # BLD AUTO: 274 K/UL (ref 135–420)
PMV BLD AUTO: 10.3 FL (ref 9.2–11.8)
POTASSIUM SERPL-SCNC: 4.6 MMOL/L (ref 3.5–5.5)
PROT SERPL-MCNC: 7 G/DL (ref 6.4–8.2)
RBC # BLD AUTO: 4.07 M/UL (ref 4.2–5.3)
SODIUM SERPL-SCNC: 141 MMOL/L (ref 136–145)
WBC # BLD AUTO: 4.9 K/UL (ref 4.6–13.2)

## 2018-04-23 PROCEDURE — 85025 COMPLETE CBC W/AUTO DIFF WBC: CPT | Performed by: ORTHOPAEDIC SURGERY

## 2018-04-23 PROCEDURE — 93005 ELECTROCARDIOGRAM TRACING: CPT

## 2018-04-23 PROCEDURE — 80053 COMPREHEN METABOLIC PANEL: CPT | Performed by: ORTHOPAEDIC SURGERY

## 2018-04-23 PROCEDURE — 36415 COLL VENOUS BLD VENIPUNCTURE: CPT | Performed by: ORTHOPAEDIC SURGERY

## 2018-04-23 RX ORDER — ERYTHROMYCIN 5 MG/G
OINTMENT OPHTHALMIC
COMMUNITY

## 2018-04-23 RX ORDER — METFORMIN HYDROCHLORIDE 500 MG/1
500 TABLET ORAL EVERY EVENING
COMMUNITY

## 2018-04-23 RX ORDER — GABAPENTIN 600 MG/1
600 TABLET ORAL 3 TIMES DAILY
COMMUNITY

## 2018-04-23 RX ORDER — BISMUTH SUBSALICYLATE 262 MG
1 TABLET,CHEWABLE ORAL DAILY
COMMUNITY

## 2018-04-23 RX ORDER — CELECOXIB 200 MG/1
200 CAPSULE ORAL DAILY
COMMUNITY

## 2018-04-23 RX ORDER — DOXYCYCLINE 50 MG/1
50 TABLET ORAL DAILY
COMMUNITY

## 2018-04-23 RX ORDER — PROGESTERONE 100 MG/1
100 CAPSULE ORAL DAILY
COMMUNITY

## 2018-04-23 RX ORDER — LANOLIN ALCOHOL/MO/W.PET/CERES
400 CREAM (GRAM) TOPICAL DAILY
COMMUNITY

## 2018-04-23 RX ORDER — LEVOTHYROXINE SODIUM 100 UG/1
100 TABLET ORAL
COMMUNITY

## 2018-04-23 RX ORDER — ATORVASTATIN CALCIUM 10 MG/1
10 TABLET, FILM COATED ORAL DAILY
COMMUNITY

## 2018-04-23 RX ORDER — ESOMEPRAZOLE MAGNESIUM 40 MG/1
40 CAPSULE, DELAYED RELEASE ORAL DAILY
COMMUNITY

## 2018-04-23 RX ORDER — CALCIUM CARBONATE 500(1250)
TABLET ORAL 2 TIMES DAILY
COMMUNITY

## 2018-04-23 RX ORDER — ESTRADIOL 0.5 MG/1
0.5 TABLET ORAL DAILY
COMMUNITY

## 2018-04-23 NOTE — PERIOP NOTES
PAT - SURGICAL PRE-ADMISSION INSTRUCTIONS    NAME:  Larry Platt                                                          TODAY'S DATE:  4/23/2018    SURGERY DATE:  5/8/2018                                  SURGERY ARRIVAL TIME:   0545    1. Do NOT eat or drink anything, including candy or gum, after MIDNIGHT on 5/7/18 , unless you have specific instructions from your Surgeon or Anesthesia Provider to do so. 2. No smoking on the day of surgery. 3. No alcohol 24 hours prior to the day of surgery. 4. No recreational drugs for one week prior to the day of surgery. 5. Leave all valuables, including money/purse, at home. 6. Remove all jewelry, nail polish, makeup (including mascara); no lotions, powders, deodorant, or perfume/cologne/after shave. 7. Glasses/Contact lenses and Dentures may be worn to the hospital.  They will be removed prior to surgery. 8. Call your doctor if symptoms of a cold or illness develop within 24 ours prior to surgery. 9. AN ADULT MUST DRIVE YOU HOME AFTER OUTPATIENT SURGERY. 10. If you are having an OUTPATIENT procedure, please make arrangements for a responsible adult to be with you for 24 hours after your surgery. 11. If you are admitted to the hospital, you will be assigned to a bed after surgery is complete. Normally a family member will not be able to see you until you are in your assigned bed. 15. Family is encouraged to accompany you to the hospital.  We ask visitors in the treatment area to be limited to ONE person at a time to ensure patient privacy. EXCEPTIONS WILL BE MADE AS NEEDED. 15. Children under 12 are discouraged from entering the treatment area and need to be supervised by an adult when in the waiting room. Special Instructions: Take these medications the morning of surgery with a sip of water:  NEXIUM, LEVOTHYROXINE, GABAPENTIN, HOLD oral diabetic medication on the MORNING OF surgery. , HOLD metformin/glucophage dose starting the EVENING BEFORE the day of surgery. Patient Prep:    use CHG solution    These surgical instructions were reviewed with PATIENT during the PAT VISIT. A printed copy of the instructions was provided to PATIENT. Directions: On the morning of surgery, please go to the [facility]. Enter the building from the [facility] entrance, 1st floor (next to the Emergency Room entrance). Take the elevator to the 2nd floor. Sign in at the Registration Desk.     If you have any questions and/or concerns, please do not hesitate to call:  (Prior to the day of surgery)  [facility] unit:  [phone]  (Day of surgery)  [facility] unit:  [phone]

## 2018-04-24 LAB
ATRIAL RATE: 60 BPM
CALCULATED P AXIS, ECG09: 75 DEGREES
CALCULATED R AXIS, ECG10: -82 DEGREES
CALCULATED T AXIS, ECG11: 55 DEGREES
DIAGNOSIS, 93000: NORMAL
P-R INTERVAL, ECG05: 198 MS
Q-T INTERVAL, ECG07: 462 MS
QRS DURATION, ECG06: 136 MS
QTC CALCULATION (BEZET), ECG08: 462 MS
VENTRICULAR RATE, ECG03: 60 BPM

## 2018-04-26 NOTE — H&P
Allpratibha Renee  2018 1:41 PM  Location: The [address] [place] [address]  Patient #: [id number]  : 1950   / Language: [place] / Race: White  Female      History of Present Illness   The patient is a 77year old female who presents for a Recheck of Hand Problem. The problem is described as being located in the right hand (recheck for possible right carpal tunnel release on May 8, 2018). The onset of the hand problem has been gradually over time. The symptom has been occuring for years. The symptom occurs in a persistent pattern. The patient describes having dull ache and numbness and tingling in fingers (right thumb, index, ring). The course has been worsening (numbness, pain is better). Previous evaluations done by neurologist. Previous diagnostic tests have included EMG/PNCV's. Patient previous treatment has included  brace and injection. The patient's dominant hand is their left. Note: I have reviewed the patient's reason for visit, review of systems, and past medical and social history as documented by my staff.  Pertinent items were discussed with the patient. Problem List/Past Medical   Peripheral neuropathy (356.9  G62. 9)    Arthritis (716.90  M19.90)    Osteopenia (733.90  M85.80)    Kidney Stone    Meningioma (225.2  D32.9)    Idiopathic neuropathy (355.9  G60.9)    Diabetes mellitus (250.00  E11.9)    Hypothyroid (244.9  E03. 9)    Gastroesophageal reflux (530.81  K21.9)    Sprain of finger of right hand, initial encounter (842.10  S63.619A)    Carpal tunnel syndrome of left wrist (354.0  G56.02)    Blepharitis (373.00  H01.009)      Allergies   Betadine *ANTISEPTICS & DISINFECTANTS*   Rash. Percocet *ANALGESICS - OPIOID*   delirium    Family History   Family history unknown      Social History   Non Drinker/No Alcohol Use    Tobacco Use   Never smoker. No Drug Use      Medication History   Doxycycline Hyclate  (50MG Capsule, Oral daily) Active. Allergy Shots  Active.   ALPRAZolam  (0.25MG Tablet, Oral as needed) Active. Atorvastatin Calcium  (10MG Tablet, Oral daily) Active. Calcium  (500MG Tablet, Oral two times daily) Active. Co Q10  (200MG Capsule, Oral daily) Active. Estradiol  (0.05MG Patch Weekly, Transdermal) Active. Gabapentin  (600MG Tablet, Oral three times daily) Active. Magnesium Oxide  (400MG Tablet, Oral daily) Active. MetFORMIN HCl  (500MG Tablet, Oral daily) Active. Multivitamin  (Oral daily) Active. (A, B,C,D,E)  NexIUM  (40MG Capsule DR, Oral) Active. Progesterone  (100MG Capsule, Oral) Active. Synthroid  (100MCG Tablet, Oral daily) Active. Medications Reconciled     Past Surgical History   Cholecystectomy    Arthroscopy   Right knee  H/O bilateral breast reduction surgery (S15.01  V94.334)    Salpingectomy; Bilateral    Back Surgery    Neck Surgery    H/O cosmetic surgery   face  UPJ (ureteropelvic junction) obstruction (593.4  N13.5)   Right kidney  H/O septoplasty      Diagnostic Studies History  EMG/NCS   Results: . 3-: This is an abnormal study with evidence for a mild right median neuropathy with compression at the wrist compatible with carpal tunnel syndrome. There has been some progression compared to a previous EMG 2005. Review of Systems   General Not Present- Chills and Fever. Skin Not Present- Bruising, Pallor and Skin Color Changes. Respiratory Not Present- Cough and Difficulty Breathing. Cardiovascular Not Present- Chest Pain and Fainting / Blacking Out. Musculoskeletal Not Present- Decreased Range of Motion, Joint Pain and Joint Swelling. Neurological Present- Numbness and Tingling. Not Present- Dysesthesia, Paresthesias and Weakness In Extremities. Hematology Not Present- Abnormal Bleeding and Petechiae. Physical Exam   General  Mental Status - Alert. General Appearance - Cooperative and Well groomed, Not in acute distress, Not Sickly. Orientation - Oriented X4.   Build & Nutrition - Well nourished and Well developed. Posture - Normal posture. Gait - Normal.  Hydration - Well hydrated. Voice - Normal.    Integumentary  General Characteristics  Color - normal coloration of skin. Skin Moisture - normal skin moisture. Texture - normal skin texture. Chest and Lung Exam  Inspection  Chest Wall - Normal. Shape - Normal and Symmetric. Movements - Symmetrical. Accessory muscles - No use of accessory muscles in breathing. Auscultation  Breath sounds - Normal. Adventitious sounds - No Adventitious sounds. Cardiovascular  Auscultation  Heart Sounds - S1 WNL and S2 WNL, No S3. Murmurs & Other Heart Sounds - Auscultation of the heart reveals - No Murmurs. Abdomen  Inspection - Inspection Normal.    Neurologic  Sensory-previous  Median - Motor - normal. Sensory - diminished. Musculoskeletal  Upper Extremity    Hand/Wrist: Wrist - Functional Testing - Carpal Compression Test positive, (R), Carpal Compression Test positive, (L), Phalen's Sign positive, (R), Phalen's Sign (Prayer Sign) positive. (L), Reverse Phalen's Sign positive, (R) and Tinel's Sign positive, (R). Phalanges:  Right: Thumb - Functional Testing - Flexor Pollicis Longus is intact. Index Finger - Functional Testing - Flexor Digitorum Superficialis is intact and Flexor Digitorum Profundus is intact. Long Finger - Functional Testing - Flexor Digitorum Superficialis is intact and Flexor Digitorum Profundus is intact. Ring Finger - Functional Testing - Flexor Digitorum Superficialis is intact and Flexor Digitorum Profundus is intact. Small Finger - Functional Testing - Flexor Digitorum Superficialis is intact and Flexor Digitorum Profundus is intact. Assessment & Plan   Carpal tunnel syndrome of right wrist (354.0  G56.01)  Impression: She is continued findings consistent with carpal tunnel syndrome. She has nerve conduction studies from May 11, 2016 which showed moderately severe right carpal tunnel syndrome.  There is evidence of further progression from 2013. Her symptoms have continued to progress. She has failed to respond to continued conservative treatment. She would like to proceed with operative intervention. Risks and benefits of surgery were discussed with her. We will proceed with a right carpal tunnel release. Current Plans  The procedure was discussed with the patient and a written consent was obtained and questions were answered.  Risks of the procedure were discussed and include but are not limited to infection, bleeding, nerve, vascular injury as well as the need for future procedures.  It was also discussed the importance of compliance with the treatment directions and participation in the care of the treated extremity. We discussed nerve recovery time. Patient wishes to proceed with the planned RIGHT carpal tunnel release  List of current medications documented by the Provider ()  Pt Education - General Patient Education: discussed with patient and provided information. Note: Voice recognition software may have been used to generate this report, which may have resulted in some phonetic based errors in grammar and contents.  Even though attempts were made to correct all the mistakes, some may have been missed, and remain in the body of the document.         Signed by Eddi Singh MD

## 2018-05-07 ENCOUNTER — ANESTHESIA EVENT (OUTPATIENT)
Dept: SURGERY | Age: 68
End: 2018-05-07
Payer: MEDICARE

## 2018-05-08 ENCOUNTER — HOSPITAL ENCOUNTER (OUTPATIENT)
Age: 68
Setting detail: OUTPATIENT SURGERY
Discharge: HOME | End: 2018-05-08
Attending: ORTHOPAEDIC SURGERY | Admitting: ORTHOPAEDIC SURGERY
Payer: MEDICARE

## 2018-05-08 ENCOUNTER — ANESTHESIA (OUTPATIENT)
Dept: SURGERY | Age: 68
End: 2018-05-08
Payer: MEDICARE

## 2018-05-08 VITALS
RESPIRATION RATE: 16 BRPM | WEIGHT: 153.44 LBS | SYSTOLIC BLOOD PRESSURE: 106 MMHG | DIASTOLIC BLOOD PRESSURE: 69 MMHG | OXYGEN SATURATION: 93 % | HEART RATE: 59 BPM | TEMPERATURE: 97.1 F | HEIGHT: 66 IN | BODY MASS INDEX: 24.66 KG/M2

## 2018-05-08 DIAGNOSIS — G56.01 RIGHT CARPAL TUNNEL SYNDROME: Primary | Chronic | ICD-10-CM

## 2018-05-08 LAB — GLUCOSE BLD STRIP.AUTO-MCNC: 112 MG/DL (ref 70–110)

## 2018-05-08 PROCEDURE — 77030018842 HC SOL IRR SOD CL 9% BAXT -A: Performed by: ORTHOPAEDIC SURGERY

## 2018-05-08 PROCEDURE — 74011000250 HC RX REV CODE- 250

## 2018-05-08 PROCEDURE — 77030032490 HC SLV COMPR SCD KNE COVD -B: Performed by: ORTHOPAEDIC SURGERY

## 2018-05-08 PROCEDURE — 74011000272 HC RX REV CODE- 272: Performed by: ORTHOPAEDIC SURGERY

## 2018-05-08 PROCEDURE — 74011250637 HC RX REV CODE- 250/637: Performed by: NURSE ANESTHETIST, CERTIFIED REGISTERED

## 2018-05-08 PROCEDURE — 74011250636 HC RX REV CODE- 250/636

## 2018-05-08 PROCEDURE — 76060000032 HC ANESTHESIA 0.5 TO 1 HR: Performed by: ORTHOPAEDIC SURGERY

## 2018-05-08 PROCEDURE — 76210000020 HC REC RM PH II FIRST 0.5 HR: Performed by: ORTHOPAEDIC SURGERY

## 2018-05-08 PROCEDURE — 74011250636 HC RX REV CODE- 250/636: Performed by: PHYSICIAN ASSISTANT

## 2018-05-08 PROCEDURE — 74011250636 HC RX REV CODE- 250/636: Performed by: NURSE ANESTHETIST, CERTIFIED REGISTERED

## 2018-05-08 PROCEDURE — 77030002916 HC SUT ETHLN J&J -A: Performed by: ORTHOPAEDIC SURGERY

## 2018-05-08 PROCEDURE — 77030020753 HC CUF TRNQT 1BLA STRY -B: Performed by: ORTHOPAEDIC SURGERY

## 2018-05-08 PROCEDURE — 74011000250 HC RX REV CODE- 250: Performed by: ORTHOPAEDIC SURGERY

## 2018-05-08 PROCEDURE — 82962 GLUCOSE BLOOD TEST: CPT

## 2018-05-08 PROCEDURE — 76010000138 HC OR TIME 0.5 TO 1 HR: Performed by: ORTHOPAEDIC SURGERY

## 2018-05-08 RX ORDER — ACETAMINOPHEN 325 MG/1
TABLET ORAL
COMMUNITY
End: 2018-05-08

## 2018-05-08 RX ORDER — HYDROCODONE BITARTRATE AND ACETAMINOPHEN 5; 325 MG/1; MG/1
1 TABLET ORAL ONCE
Status: CANCELLED | OUTPATIENT
Start: 2018-05-08 | End: 2018-05-08

## 2018-05-08 RX ORDER — CEFAZOLIN SODIUM 2 G/50ML
2 SOLUTION INTRAVENOUS ONCE
Status: COMPLETED | OUTPATIENT
Start: 2018-05-08 | End: 2018-05-08

## 2018-05-08 RX ORDER — LIDOCAINE HYDROCHLORIDE 10 MG/ML
0.1 INJECTION, SOLUTION EPIDURAL; INFILTRATION; INTRACAUDAL; PERINEURAL AS NEEDED
Status: DISCONTINUED | OUTPATIENT
Start: 2018-05-08 | End: 2018-05-08 | Stop reason: HOSPADM

## 2018-05-08 RX ORDER — ONDANSETRON 2 MG/ML
INJECTION INTRAMUSCULAR; INTRAVENOUS AS NEEDED
Status: DISCONTINUED | OUTPATIENT
Start: 2018-05-08 | End: 2018-05-08 | Stop reason: HOSPADM

## 2018-05-08 RX ORDER — SODIUM CHLORIDE, SODIUM LACTATE, POTASSIUM CHLORIDE, CALCIUM CHLORIDE 600; 310; 30; 20 MG/100ML; MG/100ML; MG/100ML; MG/100ML
75 INJECTION, SOLUTION INTRAVENOUS CONTINUOUS
Status: DISCONTINUED | OUTPATIENT
Start: 2018-05-08 | End: 2018-05-08 | Stop reason: HOSPADM

## 2018-05-08 RX ORDER — SODIUM CHLORIDE 0.9 % (FLUSH) 0.9 %
5-10 SYRINGE (ML) INJECTION AS NEEDED
Status: DISCONTINUED | OUTPATIENT
Start: 2018-05-08 | End: 2018-05-08 | Stop reason: HOSPADM

## 2018-05-08 RX ORDER — DIPHENHYDRAMINE HYDROCHLORIDE 50 MG/ML
25 INJECTION, SOLUTION INTRAMUSCULAR; INTRAVENOUS
Status: CANCELLED | OUTPATIENT
Start: 2018-05-08

## 2018-05-08 RX ORDER — MAGNESIUM SULFATE 100 %
4 CRYSTALS MISCELLANEOUS AS NEEDED
Status: DISCONTINUED | OUTPATIENT
Start: 2018-05-08 | End: 2018-05-08 | Stop reason: HOSPADM

## 2018-05-08 RX ORDER — LIDOCAINE HYDROCHLORIDE 20 MG/ML
INJECTION, SOLUTION EPIDURAL; INFILTRATION; INTRACAUDAL; PERINEURAL AS NEEDED
Status: DISCONTINUED | OUTPATIENT
Start: 2018-05-08 | End: 2018-05-08 | Stop reason: HOSPADM

## 2018-05-08 RX ORDER — SODIUM CHLORIDE 0.9 % (FLUSH) 0.9 %
5-10 SYRINGE (ML) INJECTION EVERY 8 HOURS
Status: DISCONTINUED | OUTPATIENT
Start: 2018-05-08 | End: 2018-05-08 | Stop reason: HOSPADM

## 2018-05-08 RX ORDER — SCOLOPAMINE TRANSDERMAL SYSTEM 1 MG/1
1 PATCH, EXTENDED RELEASE TRANSDERMAL ONCE
Status: DISCONTINUED | OUTPATIENT
Start: 2018-05-08 | End: 2018-05-08 | Stop reason: HOSPADM

## 2018-05-08 RX ORDER — DEXTROSE MONOHYDRATE 25 G/50ML
25-50 INJECTION, SOLUTION INTRAVENOUS AS NEEDED
Status: DISCONTINUED | OUTPATIENT
Start: 2018-05-08 | End: 2018-05-08 | Stop reason: HOSPADM

## 2018-05-08 RX ORDER — PROPOFOL 10 MG/ML
INJECTION, EMULSION INTRAVENOUS
Status: DISCONTINUED | OUTPATIENT
Start: 2018-05-08 | End: 2018-05-08 | Stop reason: HOSPADM

## 2018-05-08 RX ORDER — DEXAMETHASONE SODIUM PHOSPHATE 4 MG/ML
INJECTION, SOLUTION INTRA-ARTICULAR; INTRALESIONAL; INTRAMUSCULAR; INTRAVENOUS; SOFT TISSUE AS NEEDED
Status: DISCONTINUED | OUTPATIENT
Start: 2018-05-08 | End: 2018-05-08 | Stop reason: HOSPADM

## 2018-05-08 RX ORDER — SODIUM CHLORIDE, SODIUM LACTATE, POTASSIUM CHLORIDE, CALCIUM CHLORIDE 600; 310; 30; 20 MG/100ML; MG/100ML; MG/100ML; MG/100ML
75 INJECTION, SOLUTION INTRAVENOUS CONTINUOUS
Status: CANCELLED | OUTPATIENT
Start: 2018-05-08

## 2018-05-08 RX ORDER — INSULIN LISPRO 100 [IU]/ML
INJECTION, SOLUTION INTRAVENOUS; SUBCUTANEOUS ONCE
Status: DISCONTINUED | OUTPATIENT
Start: 2018-05-08 | End: 2018-05-08 | Stop reason: HOSPADM

## 2018-05-08 RX ORDER — HYDROCODONE BITARTRATE AND ACETAMINOPHEN 5; 325 MG/1; MG/1
1 TABLET ORAL
Qty: 15 TAB | Refills: 0 | Status: SHIPPED | OUTPATIENT
Start: 2018-05-08

## 2018-05-08 RX ORDER — FAMOTIDINE 20 MG/1
20 TABLET, FILM COATED ORAL ONCE
Status: COMPLETED | OUTPATIENT
Start: 2018-05-08 | End: 2018-05-08

## 2018-05-08 RX ADMIN — FAMOTIDINE 20 MG: 20 TABLET ORAL at 06:49

## 2018-05-08 RX ADMIN — SODIUM CHLORIDE, SODIUM LACTATE, POTASSIUM CHLORIDE, AND CALCIUM CHLORIDE 75 ML/HR: 600; 310; 30; 20 INJECTION, SOLUTION INTRAVENOUS at 06:49

## 2018-05-08 RX ADMIN — PROPOFOL 200 MCG/KG/MIN: 10 INJECTION, EMULSION INTRAVENOUS at 07:32

## 2018-05-08 RX ADMIN — CEFAZOLIN SODIUM 2 G: 2 SOLUTION INTRAVENOUS at 07:29

## 2018-05-08 RX ADMIN — DEXAMETHASONE SODIUM PHOSPHATE 4 MG: 4 INJECTION, SOLUTION INTRA-ARTICULAR; INTRALESIONAL; INTRAMUSCULAR; INTRAVENOUS; SOFT TISSUE at 07:35

## 2018-05-08 RX ADMIN — LIDOCAINE HYDROCHLORIDE 100 MG: 20 INJECTION, SOLUTION EPIDURAL; INFILTRATION; INTRACAUDAL; PERINEURAL at 07:32

## 2018-05-08 RX ADMIN — ONDANSETRON 4 MG: 2 INJECTION INTRAMUSCULAR; INTRAVENOUS at 07:35

## 2018-05-08 NOTE — INTERVAL H&P NOTE
H&P Update:  Eufemia Oconnor was seen and examined. History and physical has been reviewed. The patient has been examined. There have been no significant clinical changes since the completion of the originally dated History and Physical.  Patient identified by surgeon; surgical site was confirmed by patient and surgeon.     Signed By: Alysia Ayala MD     May 8, 2018 7:22 AM

## 2018-05-08 NOTE — ANESTHESIA PREPROCEDURE EVALUATION
Anesthetic History     PONV     Comments: Pt requires scop patch for all anesthetics. Review of Systems / Medical History  Patient summary reviewed, nursing notes reviewed and pertinent labs reviewed    Pulmonary  Within defined limits                 Neuro/Psych   Within defined limits           Cardiovascular                  Exercise tolerance: >4 METS     GI/Hepatic/Renal     GERD: well controlled           Endo/Other    Diabetes: well controlled, type 2  Hypothyroidism: well controlled       Other Findings   Comments: Documentation of current medication  Current medications obtained, documented and obtained? YES      Risk Factors for Postoperative nausea/vomiting:       History of postoperative nausea/vomiting? YES       Female? YES       Motion sickness? NO       Intended opioid administration for postoperative analgesia? YES      Smoking Abstinence:  Current Smoker? NO  Elective Surgery? YES  Seen preoperatively by anesthesiologist or proxy prior to day of surgery? YES  Pt abstained from smoking 24 hours prior to anesthesia?  N/A    Preventive care/screening for High Blood Pressure:  Aged 18 years and older: YES  Screened for high blood pressure: YES  Patients with high blood pressure referred to primary care provider   for BP management: YES               Physical Exam    Airway  Mallampati: II  TM Distance: 4 - 6 cm  Neck ROM: normal range of motion   Mouth opening: Normal     Cardiovascular  Regular rate and rhythm,  S1 and S2 normal,  no murmur, click, rub, or gallop  Rhythm: regular  Rate: normal         Dental  No notable dental hx       Pulmonary  Breath sounds clear to auscultation               Abdominal  GI exam deferred       Other Findings            Anesthetic Plan    ASA: 2  Anesthesia type: MAC          Induction: Intravenous  Anesthetic plan and risks discussed with: Patient

## 2018-05-08 NOTE — DISCHARGE INSTRUCTIONS
Scott Iglesias PA-C   Upper Extremity Surgery   Discharge Instructions   Please take the time to review the following instructions before you leave the hospital and use them as guidelines during your recovery from surgery. If you have any questions you may contact my office at [phone]. Wound Care/Dressing Changes:   Dont remove your dressing or get them wet. It isnt necessary to apply antibiotic ointment to your incisions. Sutures will be removed at your one week post-op visit. Staples (if you have them) are removed in two weeks. If you have steri-strips over your incision they will start to peel off in 7-10 days as you get them wet. They dont need to be removed prior to that. When they begin to peel off, you may remove them. They should all be removed by 14 days from your surgery. Showering/Bathing: You may shower after your surgery. Your dressing may NOT be removed for showering. Do not take a bath or get into a swimming pool or Jacuzzi until the incisions are completely healed. This may take about 14 days. Do not soak your incision under water. Sling: You are not required to wear your sling and should do so only as needed for comfort. You have no restrictions with regards to the movement of your shoulder. Please push to achieve full range of motion as soon as possible. Please follow motion instructions given at the time of surgery. Ice/Elevation   Continue ice consistently for 24 hours after surgery. After 48 hours, you should ice your hand 3 times per day, for 20 minutes at a time for the next 5 days. After one week from surgery, you may use ice as needed for pain and swelling. Elevate the extremity higher than your heart for the next 24 - 48 hours. If you get throbbing this is telling you to elevate the extremity. Diet:   You may advance to your regular diet as tolerated. Medication:   1.  You will be given a prescription for pain medication when you are discharged from the hospital. Take the medication as needed according to the directions on the prescription bottle. Possible side effects of the medication include dizziness, headache, nausea, vomiting, constipation and urinary retention. If you experience any of these side effects call the office so that we can assist you in relieving them. Discontinue the use of the pain medication if you develop itching, rash, shortness of breath or difficulties swallowing. If these symptoms become severe or arent relieved by discontinuing the medication you should seek immediate medical attention. Refills of pain medication are authorized during office hours only. (7 AM-3PM Mon. thru Fri.)    2. If you were prescribed Percocet/oxycodone you must have a written prescription. These medications legally cannot be called in to the pharmacy. 3. You may take over the counter Ibuprofen/Advil/Aleve between dosages of your pain medication if needed. Do not take Tylenol in addition to your pain medication as most of the pain medication already contains Tylenol. Do not exceed 3000mg of Tylenol per day. Ex: (hydrocodone 5/325g= 325mg of Tylenol)  4. You may resume the medication you were taking prior to surgery. Pain medication may change the effects of any antidepressant medication you are taking. If you have any questions about possible interactions between your regular medications and the pain medication you should consult the physician who prescribes your regular medications. Follow-up:   Check the letter for Follow-up appointment or call [phone] for questions.      DISCHARGE SUMMARY from Nurse    PATIENT INSTRUCTIONS:    After general anesthesia or intravenous sedation, for 24 hours or while taking prescription Narcotics:  · Limit your activities  · Do not drive and operate hazardous machinery  · Do not make important personal or business decisions  · Do  not drink alcoholic beverages  · If you have not urinated within 8 hours after discharge, please contact your surgeon on call. Report the following to your surgeon:  · Excessive pain, swelling, redness or odor of or around the surgical area  · Temperature over 100.5  · Nausea and vomiting lasting longer than 4 hours or if unable to take medications  · Any signs of decreased circulation or nerve impairment to extremity: change in color, persistent  numbness, tingling, coldness or increase pain  · Any questions    What to do at Home:    These are general instructions for a healthy lifestyle:    No smoking/ No tobacco products/ Avoid exposure to second hand smoke  Surgeon General's Warning:  Quitting smoking now greatly reduces serious risk to your health. Obesity, smoking, and sedentary lifestyle greatly increases your risk for illness    A healthy diet, regular physical exercise & weight monitoring are important for maintaining a healthy lifestyle    You may be retaining fluid if you have a history of heart failure or if you experience any of the following symptoms:  Weight gain of 3 pounds or more overnight or 5 pounds in a week, increased swelling in our hands or feet or shortness of breath while lying flat in bed. Please call your doctor as soon as you notice any of these symptoms; do not wait until your next office visit. Recognize signs and symptoms of STROKE:    F-face looks uneven    A-arms unable to move or move unevenly    S-speech slurred or non-existent    T-time-call 911 as soon as signs and symptoms begin-DO NOT go       Back to bed or wait to see if you get better-TIME IS BRAIN. Warning Signs of HEART ATTACK     Call 911 if you have these symptoms:   Chest discomfort. Most heart attacks involve discomfort in the center of the chest that lasts more than a few minutes, or that goes away and comes back. It can feel like uncomfortable pressure, squeezing, fullness, or pain.  Discomfort in other areas of the upper body.  Symptoms can include pain or discomfort in one or both arms, the back, neck, jaw, or stomach.  Shortness of breath with or without chest discomfort.  Other signs may include breaking out in a cold sweat, nausea, or lightheadedness. Don't wait more than five minutes to call 911 - MINUTES MATTER! Fast action can save your life. Calling 911 is almost always the fastest way to get lifesaving treatment. Emergency Medical Services staff can begin treatment when they arrive -- up to an hour sooner than if someone gets to the hospital by car. The discharge information has been reviewed with the patient. The patient verbalized understanding. Discharge medications reviewed with the patient and appropriate educational materials and side effects teaching were provided. ___________________________________________________________________________________________________________________________________  Patient armband removed and given to patient to take home.   Patient was informed of the privacy risks if armband lost or stolen

## 2018-05-08 NOTE — INTERVAL H&P NOTE
H&P Update:  Chucky Phalen was seen and examined. History and physical has been reviewed. The patient has been examined. There have been no significant clinical changes since the completion of the originally dated History and Physical.  Patient identified by surgeon; surgical site was confirmed by patient and surgeon.     Signed By: Lindsay Gupta MD     May 8, 2018 7:22 AM

## 2018-05-08 NOTE — OP NOTES
OPERATIVE NOTE    Patient: Ben Flores MRN: [id number]  CSN: [id number]    YOB: 1950  Age: 77 y.o. Sex: female        Date of Procedure: 5/8/2018     Preoperative Diagnosis: right carpal tunnel syndrome g56.01    Postoperative Diagnosis: * No post-op diagnosis entered *      Procedure: Procedure(s):  right CARPAL TUNNEL RELEASE    Surgeon: Randal Chavarria MD      First Assistant:   Imtiaz Gil  Anesthesia Staff: Anesthesiologist: Terrell Ballard MD  CRNA: Renate Rodriguez CRNA    Anesthesia: MAC      Local Used: 4 2% lidocaine and 0.05 % marcaine  50:50 mix    Fluid:  400 cc crystalloid    Tourniquet Time:   10   minutes @ 200mmHg    Estimated Blood Loss: Minimal    Specimens: None    Complications: None; patient tolerated the procedure well. Indications: This is a 77y.o. year-old patient who presents with  right carpal tunnel syndrome. The patient has failed to respond to conservative treatment. The patient wants to proceed with release of  her carpal tunnel. Description of Procedure: The patient was brought into the operating theater. After adequate anesthesia, the appropriate upper extremity was prepped and draped in the typical sterile fashion. Time outs were confirmed. Perioperative antibiotics given. Local was injected. The limb was exsanguinated with an Esmarch bandage and the tourniquet inflated to 200 mmHg. A curvilinear incision was then made, at the base of the fourth ray overlying the carpal canal.   The incision was deepened with pickups and tenotomy scissors, protecting the soft tissue. The dissection was carried down to the transverse carpal ligament. A small incision was made into the transverse carpal ligament and Phillip's scissors were used to release it proximally. Distally a groove director and a fifteen blade were used for release. The dissection was completed with Phillip's scissors.  The nerve was checked proximally and distally and found to be adequately decompressed. Very tight ligament. No masses noted. The wound was irrigated and closed using 4-0 nylon. The wound was dressed with xeroform, four by fours and fluffs. A bulky hand dressing was applied. The patient went to the recovery area in stable condition. She tolerated the procedure well and there were no complications.       Eddi Pascal MD  5/8/2018  7:58 AM

## 2018-05-08 NOTE — IP AVS SNAPSHOT
[address] 
[phone] Patient: Julio C Smith MRN: [id number] WVL:[id number] About your hospitalization You were admitted on: May 8, 2018 You last received care in the:  [facility] PHASE 2 RECOVERY You were discharged on: May 8, 2018 Why you were hospitalized Your primary diagnosis was:  Right Carpal Tunnel Syndrome Follow-up Information Follow up With Details Comments Contact Info Jesse Bradshaw MD   [id number] [place] [address] 
[phone] Roopa Bowden MD  For suture removal, For wound re-check, If symptoms worsen, as scheduled [address] [facility] [address] [address] [id number] [phone] Discharge Orders None A check sanjeev indicates which time of day the medication should be taken. My Medications START taking these medications Instructions Each Dose to Equal  
 Morning Noon Evening Bedtime HYDROcodone-acetaminophen 5-325 mg per tablet Commonly known as:  Linda Luís Your last dose was: Your next dose is: Take 1 Tab by mouth every six (6) hours as needed for Pain. Max Daily Amount: 4 Tabs. Indications: Pain 1 Tab CONTINUE taking these medications Instructions Each Dose to Equal  
 Morning Noon Evening Bedtime  
 atorvastatin 10 mg tablet Commonly known as:  LIPITOR Your last dose was: Your next dose is: Take 10 mg by mouth daily. 10 mg  
    
   
   
   
  
 calcium carbonate 500 mg calcium (1,250 mg) tablet Commonly known as:  OS-JASS Your last dose was: Your next dose is: Take  by mouth two (2) times a day. CeleBREX 200 mg capsule Generic drug:  celecoxib Your last dose was: Your next dose is: Take 200 mg by mouth daily.   
 200 mg  
    
   
   
   
  
 doxycycline 50 mg tablet Commonly known as:  ADOXA Your last dose was: Your next dose is: Take 50 mg by mouth daily. 50 mg  
    
   
   
   
  
 erythromycin ophthalmic ointment Commonly known as:  ILOTYCIN Your last dose was: Your next dose is:    
   
   
 Administer  to both eyes nightly. estradiol 0.5 mg tablet Commonly known as:  ESTRACE Your last dose was: Your next dose is: Take 0.5 mg by mouth daily. 0.5 mg  
    
   
   
   
  
 gabapentin 600 mg tablet Commonly known as:  NEURONTIN Your last dose was: Your next dose is: Take 600 mg by mouth three (3) times daily. 600 mg  
    
   
   
   
  
 levothyroxine 100 mcg tablet Commonly known as:  SYNTHROID Your last dose was: Your next dose is: Take 100 mcg by mouth Daily (before breakfast). 100 mcg  
    
   
   
   
  
 magnesium oxide 400 mg tablet Commonly known as:  MAG-OX Your last dose was: Your next dose is: Take 400 mg by mouth daily. 400 mg  
    
   
   
   
  
 metFORMIN 500 mg tablet Commonly known as:  GLUCOPHAGE Your last dose was: Your next dose is: Take 500 mg by mouth every evening. 500 mg  
    
   
   
   
  
 multivitamin tablet Commonly known as:  ONE A DAY Your last dose was: Your next dose is: Take 1 Tab by mouth daily. 1 Tab NexIUM 40 mg capsule Generic drug:  esomeprazole Your last dose was: Your next dose is: Take 40 mg by mouth daily. 40 mg  
    
   
   
   
  
 progesterone 100 mg capsule Commonly known as:  PROMETRIUM Your last dose was: Your next dose is: Take 100 mg by mouth daily. 100 mg  
    
   
   
   
  
  
STOP taking these medications TYLENOL 325 mg tablet Generic drug:  acetaminophen Where to Get Your Medications Information on where to get these meds will be given to you by the nurse or doctor. ! Ask your nurse or doctor about these medications HYDROcodone-acetaminophen 5-325 mg per tablet Opioid Education Prescription Opioids: What You Need to Know: 
 
Prescription opioids can be used to help relieve moderate-to-severe pain and are often prescribed following a surgery or injury, or for certain health conditions. These medications can be an important part of treatment but also come with serious risks. Opioids are strong pain medicines. Examples include hydrocodone, oxycodone, fentanyl, and morphine. Heroin is an example of an illegal opioid. It is important to work with your health care provider to make sure you are getting the safest, most effective care. WHAT ARE THE RISKS AND SIDE EFFECTS OF OPIOID USE? Prescription opioids carry serious risks of addiction and overdose, especially with prolonged use. An opioid overdose, often marked by slow breathing, can cause sudden death. The use of prescription opioids can have a number of side effects as well, even when taken as directed. · Tolerance-meaning you might need to take more of a medication for the same pain relief · Physical dependence-meaning you have symptoms of withdrawal when the medication is stopped. Withdrawal symptoms can include nausea, sweating, chills, diarrhea, stomach cramps, and muscle aches. Withdrawal can last up to several weeks, depending on which drug you took and how long you took it. · Increased sensitivity to pain · Constipation · Nausea, vomiting, and dry mouth · Sleepiness and dizziness · Confusion · Depression · Low levels of testosterone that can result in lower sex drive, energy, and strength · Itching and sweating RISKS ARE GREATER WITH:      
· History of drug misuse, substance use disorder, or overdose · Mental health conditions (such as depression or anxiety) · Sleep apnea · Older age (72 years or older) · Pregnancy Avoid alcohol while taking prescription opioids. Also, unless specifically advised by your health care provider, medications to avoid include: · Benzodiazepines (such as Xanax or Valium) · Muscle relaxants (such as Soma or Flexeril) · Hypnotics (such as Ambien or Lunesta) · Other prescription opioids KNOW YOUR OPTIONS Talk to your health care provider about ways to manage your pain that don't involve prescription opioids. Some of these options may actually work better and have fewer risks and side effects. Options may include: 
· Pain relievers such as acetaminophen, ibuprofen, and naproxen · Some medications that are also used for depression or seizures · Physical therapy and exercise · Counseling to help patients learn how to cope better with triggers of pain and stress. · Application of heat or cold compress · Massage therapy · Relaxation techniques Be Informed Make sure you know the name of your medication, how much and how often to take it, and its potential risks & side effects. IF YOU ARE PRESCRIBED OPIOIDS FOR PAIN: 
· Never take opioids in greater amounts or more often than prescribed. Remember the goal is not to be pain-free but to manage your pain at a tolerable level. · Follow up with your primary care provider to: · Work together to create a plan on how to manage your pain. · Talk about ways to help manage your pain that don't involve prescription opioids. · Talk about any and all concerns and side effects. · Help prevent misuse and abuse. · Never sell or share prescription opioids · Help prevent misuse and abuse. · Store prescription opioids in a secure place and out of reach of others (this may include visitors, children, friends, and family).  
· Safely dispose of unused/unwanted prescription opioids: Find your community drug take-back program or your pharmacy mail-back program, or flush them down the toilet, following guidance from the Food and Drug Administration (www.fda.gov/Drugs/ResourcesForYou). · Visit www.cdc.gov/drugoverdose to learn about the risks of opioid abuse and overdose. · If you believe you may be struggling with addiction, tell your health care provider and ask for guidance or call [employer] at 1-195-549-NMIJ. Discharge Instructions Kika Emmanuel PA-C Upper Extremity Surgery Discharge Instructions Please take the time to review the following instructions before you leave the hospital and use them as guidelines during your recovery from surgery. If you have any questions you may contact my office at [phone]. Wound Care/Dressing Changes:  
Dont remove your dressing or get them wet. It isnt necessary to apply antibiotic ointment to your incisions. Sutures will be removed at your one week post-op visit. Staples (if you have them) are removed in two weeks. If you have steri-strips over your incision they will start to peel off in 7-10 days as you get them wet. They dont need to be removed prior to that. When they begin to peel off, you may remove them. They should all be removed by 14 days from your surgery. Showering/Bathing: You may shower after your surgery. Your dressing may NOT be removed for showering. Do not take a bath or get into a swimming pool or Jacuzzi until the incisions are completely healed. This may take about 14 days. Do not soak your incision under water. Sling: You are not required to wear your sling and should do so only as needed for comfort. You have no restrictions with regards to the movement of your shoulder. Please push to achieve full range of motion as soon as possible. Please follow motion instructions given at the time of surgery. Ice/Elevation Continue ice consistently for 24 hours after surgery. After 48 hours, you should ice your hand 3 times per day, for 20 minutes at a time for the next 5 days. After one week from surgery, you may use ice as needed for pain and swelling. Elevate the extremity higher than your heart for the next 24 - 48 hours. If you get throbbing this is telling you to elevate the extremity. Diet:  
You may advance to your regular diet as tolerated. Medication:  
1. You will be given a prescription for pain medication when you are discharged from the hospital. Take the medication as needed according to the directions on the prescription bottle. Possible side effects of the medication include dizziness, headache, nausea, vomiting, constipation and urinary retention. If you experience any of these side effects call the office so that we can assist you in relieving them. Discontinue the use of the pain medication if you develop itching, rash, shortness of breath or difficulties swallowing. If these symptoms become severe or arent relieved by discontinuing the medication you should seek immediate medical attention. Refills of pain medication are authorized during office hours only. (7 AM-3PM Mon. thru Fri.) 2. If you were prescribed Percocet/oxycodone you must have a written prescription. These medications legally cannot be called in to the pharmacy. 3. You may take over the counter Ibuprofen/Advil/Aleve between dosages of your pain medication if needed. Do not take Tylenol in addition to your pain medication as most of the pain medication already contains Tylenol. Do not exceed 3000mg of Tylenol per day. Ex: (hydrocodone 5/325g= 325mg of Tylenol) 4. You may resume the medication you were taking prior to surgery.  Pain medication may change the effects of any antidepressant medication you are taking. If you have any questions about possible interactions between your regular medications and the pain medication you should consult the physician who prescribes your regular medications. Follow-up:   Check the letter for Follow-up appointment or call [phone] for questions. DISCHARGE SUMMARY from Nurse PATIENT INSTRUCTIONS: 
 
After general anesthesia or intravenous sedation, for 24 hours or while taking prescription Narcotics: · Limit your activities · Do not drive and operate hazardous machinery · Do not make important personal or business decisions · Do  not drink alcoholic beverages · If you have not urinated within 8 hours after discharge, please contact your surgeon on call. Report the following to your surgeon: 
· Excessive pain, swelling, redness or odor of or around the surgical area · Temperature over 100.5 · Nausea and vomiting lasting longer than 4 hours or if unable to take medications · Any signs of decreased circulation or nerve impairment to extremity: change in color, persistent  numbness, tingling, coldness or increase pain · Any questions What to do at Home: These are general instructions for a healthy lifestyle: No smoking/ No tobacco products/ Avoid exposure to second hand smoke Surgeon General's Warning:  Quitting smoking now greatly reduces serious risk to your health. Obesity, smoking, and sedentary lifestyle greatly increases your risk for illness A healthy diet, regular physical exercise & weight monitoring are important for maintaining a healthy lifestyle You may be retaining fluid if you have a history of heart failure or if you experience any of the following symptoms:  Weight gain of 3 pounds or more overnight or 5 pounds in a week, increased swelling in our hands or feet or shortness of breath while lying flat in bed. Please call your doctor as soon as you notice any of these symptoms; do not wait until your next office visit. Recognize signs and symptoms of STROKE: 
 
F-face looks uneven A-arms unable to move or move unevenly S-speech slurred or non-existent T-time-call 911 as soon as signs and symptoms begin-DO NOT go Back to bed or wait to see if you get better-TIME IS BRAIN. Warning Signs of HEART ATTACK Call 911 if you have these symptoms: 
? Chest discomfort. Most heart attacks involve discomfort in the center of the chest that lasts more than a few minutes, or that goes away and comes back. It can feel like uncomfortable pressure, squeezing, fullness, or pain. ? Discomfort in other areas of the upper body. Symptoms can include pain or discomfort in one or both arms, the back, neck, jaw, or stomach. ? Shortness of breath with or without chest discomfort. ? Other signs may include breaking out in a cold sweat, nausea, or lightheadedness. Don't wait more than five minutes to call [address]! Fast action can save your life. Calling 911 is almost always the fastest way to get lifesaving treatment. Emergency Medical Services staff can begin treatment when they arrive  up to an hour sooner than if someone gets to the hospital by car. The discharge information has been reviewed with the patient. The patient verbalized understanding. Discharge medications reviewed with the patient and appropriate educational materials and side effects teaching were provided. ___________________________________________________________________________________________________________________________________ Patient armband removed and given to patient to take home. Patient was informed of the privacy risks if armband lost or stolen Introducing [facility]! [address] introduces Publicate patient portal. Now you can access parts of your medical record, email your doctor's office, and request medication refills online. 1. In your internet browser, go to [email or web address]. "I AND [employer]. Open-Xchange/RIVA Groupt 2. Click on the First Time User? Click Here link in the Sign In box. You will see the New Member Sign Up page. 3. Enter your Fan[employer]uel[employer] Access Code exactly as it appears below. You will not need to use this code after youve completed the sign-up process. If you do not sign up before the expiration date, you must request a new code. · [employer] Access Code: [email or web address] Expires: 7/22/2018  2:41 PM 
 
4. Enter the last four digits of your Social Security Number (xxxx) and Date of Birth (mm/dd/yyyy) as indicated and click Submit. You will be taken to the next sign-up page. 5. Create a FanFueled ID. This will be your Fan[employer]ed login ID and cannot be changed, so think of one that is secure and easy to remember. 6. Create a [employer] password. You can change your password at any time. 7. Enter your Password Reset Question and Answer. This can be used at a later time if you forget your password. 8. Enter your e-mail address. You will receive e-mail notification when new information is available in [address] 9. Click Sign Up. You can now view and download portions of your medical record. 10. Click the Download Summary menu link to download a portable copy of your medical information. If you have questions, please visit the Frequently Asked Questions section of the [employer] website. Remember, Fan[employer]ed is NOT to be used for urgent needs. For medical emergencies, dial 911. Now available from your iPhone and Android! Introducing Frantz Mcallister As a [facility] patient, I wanted to make you aware of our electronic visit tool called Frantz Mcallister. [facility] 24/7 allows you to connect within minutes with a medical provider 24 hours a day, seven days a week via a mobile device or tablet or logging into a secure website from your computer. You can access Frantz Mcallister from anywhere in the [place].  
 
A virtual visit might be right for you when you have a simple condition and feel like you just dont want to get out of bed, or cant get away from work for an appointment, when your regular [facility] provider is not available (evenings, weekends or holidays), or when youre out of town and need minor care. Electronic visits cost only $49 and if the [employer] [facility] 24/7 provider determines a prescription is needed to treat your condition, one can be electronically transmitted to a nearby pharmacy*. Please take a moment to enroll today if you have not already done so. The enrollment process is free and takes just a few minutes. To enroll, please download the [employer] kenyon to your tablet or phone, or visit [email or web address] to enroll on your computer. And, as an [address] [place] [address] patient with a [employer] account, the results of your visits will be scanned into your electronic medical record and your primary care provider will be able to view the scanned results. We urge you to continue to see your regular [facility] provider for your ongoing medical care. And while your primary care provider may not be the one available when you seek a [employer] virtual visit, the peace of mind you get from getting a real diagnosis real time can be priceless. For more information on [employer], view our Frequently Asked Questions (FAQs) at [email or web address]. Sincerely, 
 
Lois Montesinos MD 
Chief Medical Officer [facility] Kimberlyn Hudson *:  certain medications cannot be prescribed via [employer] Providers Seen During Your Hospitalization Provider Specialty Primary office phone Eddi Pascal MD Orthopedic Surgery [phone] Your Primary Care Physician (PCP) Primary Care Physician Office Phone Office Fax Derek Henao 58 [phone] You are allergic to the following Allergen Reactions Betadine (Povidone-Iodine) Rash Percocet (Oxycodone-Acetaminophen) Other (comments) Mental status changes Recent Documentation Height Weight BMI OB Status Smoking Status 1.676 m 69.6 kg 24.77 kg/m2 Postmenopausal Never Smoker Emergency Contacts Name Discharge Info Relation Home Work Mobile Juancho Umaña Shimon Fraction DISCHARGE CAREGIVER [3] Spouse [3] [phone] Patient Belongings The following personal items are in your possession at time of discharge: 
  Dental Appliances: None  Visual Aid: Glasses      Home Medications: None   Jewelry: None  Clothing: Shirt, Socks, Footwear, Undergarments, Pants, Sweater Please provide this summary of care documentation to your next provider. Signatures-by signing, you are acknowledging that this After Visit Summary has been reviewed with you and you have received a copy. Patient Signature:  ____________________________________________________________ Date:  ____________________________________________________________  
  
[facility] Provider Signature:  ____________________________________________________________ Date:  ____________________________________________________________

## 2018-05-08 NOTE — PROGRESS NOTES
conducted a pre-surgery visit with Suma Parrish, who is a 77 y.o.,female. The  provided the following Interventions:  Initiated a relationship of care and support. Offered prayer and assurance of continued prayers on patient's behalf. Plan:  Chaplains will continue to follow and will provide pastoral care on an as needed/requested basis.  recommends bedside caregivers page  on duty if patient shows signs of acute spiritual or emotional distress.     [facility]   Spiritual Care   [phone]

## 2018-05-08 NOTE — ANESTHESIA POSTPROCEDURE EVALUATION
Post-Anesthesia Evaluation and Assessment    Patient: Suma Parrish MRN: [id number]  SSN: [id number]    YOB: 1950  Age: 77 y.o. Sex: female       Cardiovascular Function/Vital Signs  Visit Vitals    /69    Pulse (!) 59    Temp 36.2 °C (97.1 °F)    Resp 16    Ht 5' 6\" (1.676 m)    Wt 69.6 kg (153 lb 7 oz)    SpO2 93%    BMI 24.77 kg/m2       Patient is status post MAC anesthesia for Procedure(s):  right CARPAL TUNNEL RELEASE. Nausea/Vomiting: None    Postoperative hydration reviewed and adequate. Pain:  Pain Scale 1: Numeric (0 - 10) (05/08/18 0617)  Pain Intensity 1: 0 (05/08/18 0617)   Managed    Neurological Status:   Neuro (WDL): Within Defined Limits (05/08/18 0631)   At baseline    Mental Status and Level of Consciousness: Alert and oriented     Pulmonary Status:   O2 Device: Room air (05/08/18 8704)   Adequate oxygenation and airway patent    Complications related to anesthesia: None    Post-anesthesia assessment completed.  No concerns    Signed By: Ally Woods CRNA     May 8, 2018

## 2018-06-15 ENCOUNTER — HOSPITAL ENCOUNTER (OUTPATIENT)
Dept: MAMMOGRAPHY | Facility: HOSPITAL | Age: 68
Discharge: HOME | End: 2018-06-15
Attending: FAMILY MEDICINE | Admitting: FAMILY MEDICINE

## 2018-06-15 DIAGNOSIS — R92.8 ABNORMAL MAMMOGRAM: ICD-10-CM

## 2018-06-15 PROCEDURE — 77065 DX MAMMO INCL CAD UNI: CPT | Performed by: RADIOLOGY

## 2018-06-15 PROCEDURE — 77065 DX MAMMO INCL CAD UNI: CPT

## 2018-07-13 ENCOUNTER — OFFICE VISIT (OUTPATIENT)
Dept: FAMILY MEDICINE CLINIC | Facility: CLINIC | Age: 68
End: 2018-07-13

## 2018-07-13 VITALS
BODY MASS INDEX: 47.61 KG/M2 | RESPIRATION RATE: 16 BRPM | OXYGEN SATURATION: 97 % | TEMPERATURE: 98.3 F | SYSTOLIC BLOOD PRESSURE: 100 MMHG | HEART RATE: 64 BPM | WEIGHT: 220 LBS | DIASTOLIC BLOOD PRESSURE: 60 MMHG

## 2018-07-13 DIAGNOSIS — R68.84 PAIN IN UPPER JAW: Primary | ICD-10-CM

## 2018-07-13 PROCEDURE — 99213 OFFICE O/P EST LOW 20 MIN: CPT | Performed by: FAMILY MEDICINE

## 2018-07-13 RX ORDER — AMOXICILLIN 250 MG/1
250 CAPSULE ORAL 3 TIMES DAILY
Qty: 30 CAPSULE | Refills: 0 | Status: SHIPPED | OUTPATIENT
Start: 2018-07-13 | End: 2018-07-23

## 2018-07-13 NOTE — PROGRESS NOTES
Subjective   Keisha Padilla is a 68 y.o. female.     History of Present Illness   Two days of left upper teeth painful relief with pressure against jaw. No sinus congestion no sore throat. Not painful to eat. Teeth cleaned three weeks ago, had cavity filled. No pain of lower jaw, no ear or eye pain.   The following portions of the patient's history were reviewed and updated as appropriate: allergies, current medications, past family history, past medical history, past social history, past surgical history and problem list.    Review of Systems   Constitutional: Negative.    HENT: Positive for dental problem. Negative for congestion, ear pain, postnasal drip, sore throat and trouble swallowing.    Respiratory: Negative.        Objective   Physical Exam   Constitutional: She appears well-developed and well-nourished.   HENT:   Mouth/Throat: Oropharynx is clear and moist.   Can not see gum swelling.  Teeth seem in good repair.   Neck: Neck supple.   Pulmonary/Chest: Effort normal.   Lymphadenopathy:     She has no cervical adenopathy.   Vitals reviewed.      Assessment/Plan   Keisha was seen today for jaw pain.    Diagnoses and all orders for this visit:    Pain in upper jaw  -     amoxicillin (AMOXIL) 250 MG capsule; Take 1 capsule by mouth 3 (Three) Times a Day.

## 2018-07-23 ENCOUNTER — OFFICE VISIT (OUTPATIENT)
Dept: GYNECOLOGIC ONCOLOGY | Facility: CLINIC | Age: 68
End: 2018-07-23

## 2018-07-23 VITALS
TEMPERATURE: 98.6 F | HEIGHT: 57 IN | WEIGHT: 223 LBS | DIASTOLIC BLOOD PRESSURE: 71 MMHG | SYSTOLIC BLOOD PRESSURE: 145 MMHG | HEART RATE: 79 BPM | RESPIRATION RATE: 20 BRPM | OXYGEN SATURATION: 98 % | BODY MASS INDEX: 48.11 KG/M2

## 2018-07-23 DIAGNOSIS — C54.1 ENDOMETRIAL CANCER (HCC): Primary | ICD-10-CM

## 2018-07-23 PROCEDURE — 99213 OFFICE O/P EST LOW 20 MIN: CPT | Performed by: NURSE PRACTITIONER
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
## 2018-07-25 ENCOUNTER — TRANSCRIBE ORDERS (OUTPATIENT)
Dept: FAMILY MEDICINE CLINIC | Facility: CLINIC | Age: 68
End: 2018-07-25

## 2018-07-25 DIAGNOSIS — Z12.31 VISIT FOR SCREENING MAMMOGRAM: Primary | ICD-10-CM

## 2018-10-09 ENCOUNTER — OFFICE VISIT (OUTPATIENT)
Dept: FAMILY MEDICINE CLINIC | Facility: CLINIC | Age: 68
End: 2018-10-09

## 2018-10-09 VITALS
OXYGEN SATURATION: 98 % | BODY MASS INDEX: 48.24 KG/M2 | WEIGHT: 223.6 LBS | SYSTOLIC BLOOD PRESSURE: 128 MMHG | DIASTOLIC BLOOD PRESSURE: 80 MMHG | HEART RATE: 60 BPM | RESPIRATION RATE: 18 BRPM | HEIGHT: 57 IN

## 2018-10-09 DIAGNOSIS — I10 ESSENTIAL HYPERTENSION: ICD-10-CM

## 2018-10-09 DIAGNOSIS — E78.01 FAMILIAL HYPERCHOLESTEROLEMIA: ICD-10-CM

## 2018-10-09 DIAGNOSIS — E11.9 TYPE 2 DIABETES MELLITUS WITHOUT COMPLICATION, WITHOUT LONG-TERM CURRENT USE OF INSULIN (HCC): Primary | ICD-10-CM

## 2018-10-09 PROBLEM — C54.1 ENDOMETRIAL CANCER (HCC): Status: RESOLVED | Noted: 2017-01-04 | Resolved: 2018-10-09

## 2018-10-09 LAB — HBA1C MFR BLD: 6.4 %

## 2018-10-09 PROCEDURE — 83036 HEMOGLOBIN GLYCOSYLATED A1C: CPT | Performed by: FAMILY MEDICINE

## 2018-10-09 PROCEDURE — 99214 OFFICE O/P EST MOD 30 MIN: CPT | Performed by: FAMILY MEDICINE

## 2018-10-09 NOTE — PROGRESS NOTES
Subjective   Keisha Padilla is a 68 y.o. female.     History of Present Illness   She is here to follow up on her stable chronic medical problems which include DM2, HTN & HLD.  She is tolerating her medications well with no adverse events.  She is needing multiple medication refills today.  She describes good blood sugar and blood pressure control.  Her blood sugars are typically under 140.  She is anticipating HgbA1c evaluation.  Her last dilated diabetic eye exam was 2018 ([facility]).  She continues with good foot care and protection.    Review of Systems   Constitutional: Negative for chills and fever.   Eyes: Negative for visual disturbance.   Respiratory: Negative for cough and shortness of breath.    Cardiovascular: Negative for chest pain, palpitations and leg swelling.   Gastrointestinal: Negative for abdominal pain, diarrhea, nausea and vomiting.   Endocrine: Negative for polydipsia, polyphagia and polyuria.   Genitourinary: Negative for difficulty urinating.   Musculoskeletal: Positive for back pain and myalgias. Negative for arthralgias.   Skin: Negative for rash.   Neurological: Negative for headaches.   Psychiatric/Behavioral: The patient is not nervous/anxious.        Objective   Physical Exam   Constitutional: She is oriented to person, place, and time. She appears well-developed and well-nourished.   HENT:   Head: Normocephalic and atraumatic.   Right Ear: Hearing normal.   Left Ear: Hearing normal.   Mouth/Throat: Mucous membranes are normal.   Eyes: Pupils are equal, round, and reactive to light. Conjunctivae and EOM are normal.   Neck: Neck supple. No JVD present. No thyromegaly present.   Cardiovascular: Normal rate, regular rhythm and normal heart sounds.    Pulmonary/Chest: Effort normal and breath sounds normal.   Abdominal: Soft. Bowel sounds are normal. There is no tenderness.   Musculoskeletal: Normal range of motion.   Neurological: She is alert and oriented to person,  place, and time. She has normal strength. No sensory deficit. Gait normal.   Skin: Skin is warm and dry.   Psychiatric: She has a normal mood and affect. Her behavior is normal. Judgment and thought content normal. Cognition and memory are normal.   Nursing note and vitals reviewed.      Assessment/Plan   Diagnoses and all orders for this visit:    Type 2 diabetes mellitus without complication, without long-term current use of insulin (CMS/[facility])  -     POC Glycosylated Hemoglobin (Hb A1C) today is 6.40% (improved)  - Glucotrol 5 mg po once daily  - Zocor 20 mg po once daily  - Lisinopril 20 mg po once daily  - Routine blood sugar monitoring  - Diabetic diet  - Weight loss  - Continue with daily aerobic activity  - We discussed statins and ace inhibitor therapy  - Annual eye exams are encouraged  - Continue with good foot care and protection    Essential hypertension        - Lisinopril 20 mg po once daily        - Diltiazem  mg po once daily  - Healthy heart diet  - Daily aerobic exercise  - Routine blood pressure monitoring  - [place] Heart Disease and Stroke Prevention Task Force pamphlet reviewed and administered.    Familial hypercholesterolemia        - Zocor 20 mg po once daily  - Low cholesterol diet (< 200 mg / day)  - Daily aerobic exercise  - Aspirin 81 mg po once daily    RTC in 6 months.

## 2018-10-10 ENCOUNTER — TELEPHONE (OUTPATIENT)
Dept: GYNECOLOGIC ONCOLOGY | Facility: CLINIC | Age: 68
End: 2018-10-10

## 2018-10-10 NOTE — TELEPHONE ENCOUNTER
----- Message from Kely Do sent at 10/10/2018  9:00 AM EDT -----  [facility]  Contact: [phone]  Patient called and she said she saw Dr. Bon Neal yesterday and he said there was something on her chart that needs to be taken off she needs a phone call back.  Returned pt's call, left message to call.

## 2019-01-28 ENCOUNTER — OFFICE VISIT (OUTPATIENT)
Dept: GYNECOLOGIC ONCOLOGY | Facility: CLINIC | Age: 69
End: 2019-01-28

## 2019-01-28 VITALS
HEART RATE: 73 BPM | DIASTOLIC BLOOD PRESSURE: 65 MMHG | BODY MASS INDEX: 48.26 KG/M2 | TEMPERATURE: 98.4 F | SYSTOLIC BLOOD PRESSURE: 144 MMHG | RESPIRATION RATE: 14 BRPM | OXYGEN SATURATION: 96 % | WEIGHT: 223 LBS

## 2019-01-28 DIAGNOSIS — C54.1 ENDOMETRIAL CANCER (HCC): Primary | ICD-10-CM

## 2019-01-28 PROBLEM — E11.9 TYPE 2 DIABETES MELLITUS WITHOUT COMPLICATION, WITHOUT LONG-TERM CURRENT USE OF INSULIN (HCC): Status: ACTIVE | Noted: 2019-01-28

## 2019-01-28 PROCEDURE — 99213 OFFICE O/P EST LOW 20 MIN: CPT | Performed by: NURSE PRACTITIONER

## 2019-01-28 NOTE — PROGRESS NOTES
GYN ONCOLOGY CANCER SURVEILLANCE FOLLOW-UP    Keisha Padilla  [id number]  1950    Chief Complaint: Follow-up (no gyn complaints)        History of present illness:  Keisha Padilla is a 68 y.o. year old female who is here today for ongoing surveillance of Endometrial Cancer, see Cancer History. She reports she is feeling very well today and has no complaints. She denies vaginal bleeding, pelvic pain, and changes in bowel or bladder function. She will reach 5 years from completion of treatment with surgery alone later this year. She would like to initiate care with generally gynecology after her surveillance period for ongoing annual exams in the future.           Cancer History:      Endometrial cancer (CMS/HCC)    2014 Surgery     TVH/LSO for invasive endometrial cancer, [id number] grade 1            Past Medical History:   Diagnosis Date   • Asthma    • Chronic venous insufficiency    • Contusion of breast, left 2016   • Diabetes mellitus (CMS/HCC)    • Endometrial cancer (CMS/HCC) 2017   • Hyperlipidemia    • Hypertension    • Morbid obesity (CMS/HCC)    • Osteoarthritis of knee    • Varicose veins of legs    • Vitamin D deficiency        Past Surgical History:   Procedure Laterality Date   •  SECTION     • COLONOSCOPY     • HEMORRHOIDECTOMY     • HYSTERECTOMY     • OOPHORECTOMY     • TONSILLECTOMY AND ADENOIDECTOMY         MEDICATIONS: The current medication list was reviewed and reconciled.     Allergies:  is allergic to sulfa antibiotics.    Family History   Problem Relation Age of Onset   • Pancreatitis Mother    • Alzheimer's disease Father        Review of Systems   Constitutional: Negative for appetite change, chills, fatigue, fever and unexpected weight change.   Respiratory: Negative for cough, shortness of breath and wheezing.    Cardiovascular: Negative for chest pain, palpitations and leg swelling.   Gastrointestinal: Negative for abdominal distention, abdominal  pain, blood in stool, constipation, diarrhea, nausea and vomiting.   Endocrine: Negative.    Genitourinary: Negative for dyspareunia, dysuria, frequency, genital sores, hematuria, pelvic pain, urgency, vaginal bleeding, vaginal discharge and vaginal pain.   Musculoskeletal: Negative for arthralgias, gait problem and joint swelling.   Neurological: Positive for dizziness. Negative for seizures, syncope, weakness, light-headedness, numbness and headaches.   Hematological: Negative for adenopathy.   Psychiatric/Behavioral: Negative.        Physical Exam  Vital Signs: /65   Pulse 73   Temp 98.4 °F (36.9 °C) (Temporal)   Resp 14   Wt 101 kg (223 lb)   SpO2 96%   BMI 48.26 kg/m²    General Appearance:  alert, cooperative, no apparent distress, appears stated age and obese   Neurologic/Psychiatric: A&O x 3, gait steady, appropriate affect   HEENT:  Normocephalic, without obvious abnormality, mucous membranes moist   Neck: Supple, symmetrical, trachea midline, no adenopathy;  No thyromegaly, masses, or tenderness   Back:   Symmetric, no curvature, ROM normal, no CVA tenderness   Lungs:   Clear to auscultation bilaterally; respirations regular, even, and unlabored bilaterally   Heart:  Regular rate and rhythm, no murmurs appreciated   Breasts:  deferred   Abdomen:   Soft, non-tender, non-distended, no organomegaly and Exam limited d/t habitus.   Lymph nodes: No cervical, supraclavicular, inguinal or axillary adenopathy noted   Extremities: Normal, atraumatic; no clubbing, cyanosis, or edema    Pelvic: External Genitalia  atrophic, without lesions  Vagina  is pale, atrophic.   Vaginal Cuff  Female Vaginal Cuff: smooth, intact and without visible lesions  Uterus  surgically absent and no palpable masses, exam limited d/t habitus  Ovaries  surgically absent bilaterallly   Parametria  smooth  Rectovaginal  Female rectovaginal: deferred     ECOG Performance Status: 0 - Asymptomatic    Procedure Note:  No notes on  file      Assessment and Plan:  Keisha was seen today for follow-up.    Diagnoses and all orders for this visit:    Endometrial cancer (CMS/HCC)        There is no evidence of disease upon today's exam. She is understanding to call with any changes in pelvic symptoms or general GYN concerns at any time between regularly scheduled visits. She is nearing 5 years from completion of treatment. We will see her for one more 6 month follow up, if there is no clinical evidence or disease she may go back to regular gynecologic care          Return to clinic in 6 months for ongoing cancer surveillance.      PRIMITIVO Chance        Note: Speech recognition transcription software was used to dictate portions of this document.  An attempt at proofreading has been made though minor errors in transcription may still be present.  Please do not hesitate to call our office with any questions.

## 2019-01-29 ENCOUNTER — OFFICE VISIT (OUTPATIENT)
Dept: FAMILY MEDICINE CLINIC | Facility: CLINIC | Age: 69
End: 2019-01-29

## 2019-01-29 VITALS
HEART RATE: 64 BPM | DIASTOLIC BLOOD PRESSURE: 72 MMHG | OXYGEN SATURATION: 97 % | BODY MASS INDEX: 47.9 KG/M2 | HEIGHT: 57 IN | SYSTOLIC BLOOD PRESSURE: 136 MMHG | RESPIRATION RATE: 16 BRPM | WEIGHT: 222 LBS

## 2019-01-29 DIAGNOSIS — I10 ESSENTIAL HYPERTENSION: Primary | ICD-10-CM

## 2019-01-29 PROCEDURE — 99213 OFFICE O/P EST LOW 20 MIN: CPT | Performed by: FAMILY MEDICINE

## 2019-01-29 NOTE — PROGRESS NOTES
"Subjective   Keisha Padilla is a 68 y.o. female.     History of Present Illness   She is here to discuss her blood pressure.  She describes a recent visit with an APRN for cancer surveillance.  \"She made me real nervous.\"  \"She said my blood pressure was not controlled.\"  She describes tolerating her present medication regimen well with no adverse events.  She is inquiring about her recent blood pressure evaluation at the specialist office.  She denies unusual headaches, chest pain, dyspnea or racing heart beats.    Review of Systems   Constitutional: Negative for activity change.   Respiratory: Negative for shortness of breath.    Cardiovascular: Negative for chest pain and palpitations.   Neurological: Negative for syncope and headaches.       Objective   Physical Exam   Constitutional: She is oriented to person, place, and time. She appears well-developed and well-nourished.   HENT:   Head: Normocephalic and atraumatic.   Right Ear: Hearing normal.   Left Ear: Hearing normal.   Mouth/Throat: Mucous membranes are normal.   Eyes: Conjunctivae and EOM are normal. Pupils are equal, round, and reactive to light.   Neck: Neck supple. No JVD present. No thyromegaly present.   Cardiovascular: Normal rate, regular rhythm and normal heart sounds.   Pulmonary/Chest: Effort normal and breath sounds normal.   Musculoskeletal: Normal range of motion.   Neurological: She is alert and oriented to person, place, and time. She has normal strength. No sensory deficit. Gait normal.   Skin: Skin is warm and dry.   Psychiatric: She has a normal mood and affect. Her behavior is normal. Judgment and thought content normal. Cognition and memory are normal.   Nursing note and vitals reviewed.      Assessment/Plan   Diagnoses and all orders for this visit:    Essential hypertension        - Lisinopril 20 mg po once daily        - We discussed fear, anxiety and emotions and blood pressure response  - Healthy heart diet  - Weight " loss  - Daily aerobic exercise  - Routine blood pressure monitoring at home at rest for 10 minutes  - RTC if resting BP's > 140/90  - [place] Heart Disease and Stroke Prevention Task Force pamphlet reviewed and administered.

## 2019-04-11 ENCOUNTER — OFFICE VISIT (OUTPATIENT)
Dept: FAMILY MEDICINE CLINIC | Facility: CLINIC | Age: 69
End: 2019-04-11

## 2019-04-11 VITALS
TEMPERATURE: 97.8 F | WEIGHT: 224.6 LBS | OXYGEN SATURATION: 97 % | SYSTOLIC BLOOD PRESSURE: 132 MMHG | BODY MASS INDEX: 48.46 KG/M2 | DIASTOLIC BLOOD PRESSURE: 70 MMHG | HEIGHT: 57 IN | HEART RATE: 65 BPM | RESPIRATION RATE: 16 BRPM

## 2019-04-11 DIAGNOSIS — Z00.00 MEDICARE ANNUAL WELLNESS VISIT, SUBSEQUENT: ICD-10-CM

## 2019-04-11 DIAGNOSIS — I10 ESSENTIAL HYPERTENSION: ICD-10-CM

## 2019-04-11 DIAGNOSIS — E78.01 FAMILIAL HYPERCHOLESTEROLEMIA: ICD-10-CM

## 2019-04-11 DIAGNOSIS — M17.0 PRIMARY OSTEOARTHRITIS OF BOTH KNEES: ICD-10-CM

## 2019-04-11 DIAGNOSIS — E11.9 TYPE 2 DIABETES MELLITUS WITHOUT COMPLICATION, WITHOUT LONG-TERM CURRENT USE OF INSULIN (HCC): ICD-10-CM

## 2019-04-11 LAB
ALBUMIN SERPL-MCNC: 3.9 G/DL (ref 3.5–5.2)
ALBUMIN/GLOB SERPL: 1.3 G/DL
ALP SERPL-CCNC: 80 U/L (ref 39–117)
ALT SERPL W P-5'-P-CCNC: 14 U/L (ref 1–33)
ANION GAP SERPL CALCULATED.3IONS-SCNC: 14.4 MMOL/L
AST SERPL-CCNC: 14 U/L (ref 1–32)
BASOPHILS # BLD AUTO: 0.04 10*3/MM3 (ref 0–0.2)
BASOPHILS NFR BLD AUTO: 0.6 % (ref 0–1.5)
BILIRUB BLD-MCNC: NEGATIVE MG/DL
BILIRUB SERPL-MCNC: 0.5 MG/DL (ref 0.2–1.2)
BUN BLD-MCNC: 20 MG/DL (ref 8–23)
BUN/CREAT SERPL: 21.1 (ref 7–25)
CALCIUM SPEC-SCNC: 9.4 MG/DL (ref 8.6–10.5)
CHLORIDE SERPL-SCNC: 100 MMOL/L (ref 98–107)
CHOLEST SERPL-MCNC: 149 MG/DL (ref 0–200)
CLARITY, POC: CLEAR
CO2 SERPL-SCNC: 23.6 MMOL/L (ref 22–29)
COLOR UR: YELLOW
CREAT BLD-MCNC: 0.95 MG/DL (ref 0.57–1)
CRP SERPL-MCNC: 1.66 MG/DL (ref 0–0.5)
DEPRECATED RDW RBC AUTO: 47.8 FL (ref 37–54)
EOSINOPHIL # BLD AUTO: 0.19 10*3/MM3 (ref 0–0.4)
EOSINOPHIL NFR BLD AUTO: 2.7 % (ref 0.3–6.2)
ERYTHROCYTE [DISTWIDTH] IN BLOOD BY AUTOMATED COUNT: 13.3 % (ref 12.3–15.4)
GFR SERPL CREATININE-BSD FRML MDRD: 58 ML/MIN/1.73
GLOBULIN UR ELPH-MCNC: 3.1 GM/DL
GLUCOSE BLD-MCNC: 113 MG/DL (ref 65–99)
GLUCOSE UR STRIP-MCNC: NEGATIVE MG/DL
HBA1C MFR BLD: 6.4 % (ref 4.8–5.6)
HCT VFR BLD AUTO: 45.1 % (ref 34–46.6)
HDLC SERPL-MCNC: 58 MG/DL (ref 40–60)
HGB BLD-MCNC: 14.3 G/DL (ref 12–15.9)
IMM GRANULOCYTES # BLD AUTO: 0.03 10*3/MM3 (ref 0–0.05)
IMM GRANULOCYTES NFR BLD AUTO: 0.4 % (ref 0–0.5)
KETONES UR QL: ABNORMAL
LDLC SERPL CALC-MCNC: 69 MG/DL (ref 0–100)
LDLC/HDLC SERPL: 1.19 {RATIO}
LEUKOCYTE EST, POC: ABNORMAL
LYMPHOCYTES # BLD AUTO: 2.48 10*3/MM3 (ref 0.7–3.1)
LYMPHOCYTES NFR BLD AUTO: 35.6 % (ref 19.6–45.3)
MCH RBC QN AUTO: 30.8 PG (ref 26.6–33)
MCHC RBC AUTO-ENTMCNC: 31.7 G/DL (ref 31.5–35.7)
MCV RBC AUTO: 97 FL (ref 79–97)
MONOCYTES # BLD AUTO: 0.58 10*3/MM3 (ref 0.1–0.9)
MONOCYTES NFR BLD AUTO: 8.3 % (ref 5–12)
NEUTROPHILS # BLD AUTO: 3.64 10*3/MM3 (ref 1.4–7)
NEUTROPHILS NFR BLD AUTO: 52.4 % (ref 42.7–76)
NITRITE UR-MCNC: POSITIVE MG/ML
NRBC BLD AUTO-RTO: 0 /100 WBC (ref 0–0)
PH UR: 5.5 [PH] (ref 5–8)
PLATELET # BLD AUTO: 212 10*3/MM3 (ref 140–450)
PMV BLD AUTO: 12.2 FL (ref 6–12)
POC CREATININE URINE: 300
POC MICROALBUMIN URINE: 30
POTASSIUM BLD-SCNC: 4.6 MMOL/L (ref 3.5–5.2)
PROT SERPL-MCNC: 7 G/DL (ref 6–8.5)
PROT UR STRIP-MCNC: NEGATIVE MG/DL
RBC # BLD AUTO: 4.65 10*6/MM3 (ref 3.77–5.28)
RBC # UR STRIP: NEGATIVE /UL
SODIUM BLD-SCNC: 138 MMOL/L (ref 136–145)
SP GR UR: 1.03 (ref 1–1.03)
TRIGL SERPL-MCNC: 111 MG/DL (ref 0–150)
TSH SERPL DL<=0.05 MIU/L-ACNC: 2.66 MIU/ML (ref 0.27–4.2)
URATE SERPL-MCNC: 6 MG/DL (ref 2.4–5.7)
UROBILINOGEN UR QL: NORMAL
VLDLC SERPL-MCNC: 22.2 MG/DL (ref 5–40)
WBC NRBC COR # BLD: 6.96 10*3/MM3 (ref 3.4–10.8)

## 2019-04-11 PROCEDURE — 80053 COMPREHEN METABOLIC PANEL: CPT | Performed by: FAMILY MEDICINE

## 2019-04-11 PROCEDURE — 80061 LIPID PANEL: CPT | Performed by: FAMILY MEDICINE

## 2019-04-11 PROCEDURE — 86140 C-REACTIVE PROTEIN: CPT | Performed by: FAMILY MEDICINE

## 2019-04-11 PROCEDURE — G0439 PPPS, SUBSEQ VISIT: HCPCS | Performed by: FAMILY MEDICINE

## 2019-04-11 PROCEDURE — 83036 HEMOGLOBIN GLYCOSYLATED A1C: CPT | Performed by: FAMILY MEDICINE

## 2019-04-11 PROCEDURE — 81003 URINALYSIS AUTO W/O SCOPE: CPT | Performed by: FAMILY MEDICINE

## 2019-04-11 PROCEDURE — 85025 COMPLETE CBC W/AUTO DIFF WBC: CPT | Performed by: FAMILY MEDICINE

## 2019-04-11 PROCEDURE — 84550 ASSAY OF BLOOD/URIC ACID: CPT | Performed by: FAMILY MEDICINE

## 2019-04-11 PROCEDURE — 82044 UR ALBUMIN SEMIQUANTITATIVE: CPT | Performed by: FAMILY MEDICINE

## 2019-04-11 PROCEDURE — 36415 COLL VENOUS BLD VENIPUNCTURE: CPT | Performed by: FAMILY MEDICINE

## 2019-04-11 PROCEDURE — 84443 ASSAY THYROID STIM HORMONE: CPT | Performed by: FAMILY MEDICINE

## 2019-04-11 RX ORDER — GLIPIZIDE 5 MG/1
TABLET ORAL
Qty: 180 TABLET | Refills: 3 | Status: SHIPPED | OUTPATIENT
Start: 2019-04-11 | End: 2020-04-02 | Stop reason: SDUPTHER

## 2019-04-11 RX ORDER — SIMVASTATIN 20 MG
20 TABLET ORAL NIGHTLY
Qty: 90 TABLET | Refills: 3 | Status: SHIPPED | OUTPATIENT
Start: 2019-04-11 | End: 2020-04-02 | Stop reason: SDUPTHER

## 2019-04-11 RX ORDER — DILTIAZEM HYDROCHLORIDE 240 MG/1
240 CAPSULE, COATED, EXTENDED RELEASE ORAL DAILY
Qty: 90 CAPSULE | Refills: 3 | Status: SHIPPED | OUTPATIENT
Start: 2019-04-11 | End: 2020-04-02 | Stop reason: SDUPTHER

## 2019-04-11 RX ORDER — LISINOPRIL 20 MG/1
20 TABLET ORAL DAILY
Qty: 90 TABLET | Refills: 3 | Status: SHIPPED | OUTPATIENT
Start: 2019-04-11 | End: 2020-04-02 | Stop reason: SDUPTHER

## 2019-04-11 RX ORDER — DICLOFENAC SODIUM 75 MG/1
75 TABLET, DELAYED RELEASE ORAL DAILY PRN
Qty: 90 TABLET | Refills: 3 | Status: SHIPPED | OUTPATIENT
Start: 2019-04-11

## 2019-04-11 NOTE — PROGRESS NOTES
The ABCs of the Annual Wellness Visit    HEALTH RISK ASSESSMENT  SUBSEQUENT Medicare Wellness Visit   1950    Recent Hospitalizations: NONE    Current Medical Providers: Patient Care Team:  Bon Neal MD as PCP - General (Family Medicine)    Smoking Status   Social History    Tobacco Use      Smoking status: Never Smoker      Smokeless tobacco: Never Used    Alcohol Consumption    reports that she does not drink alcohol.    Depression Screen   PHQ-2 Depression Screening  Little interest or pleasure in doing things? 0   Feeling down, depressed, or hopeless? 0   PHQ-2 Total Score 0        Health Habits and Functional and Cognitive Screening  Functional & Cognitive Status 4/11/2019   Do you have difficulty preparing food and eating? No   Do you have difficulty bathing yourself, getting dressed or grooming yourself? No   Do you have difficulty using the toilet? No   Do you have difficulty moving around from place to place? No   Do you have trouble with steps or getting out of a bed or a chair? No   In the past year have you fallen or experienced a near fall? No   Current Diet Well Balanced Diet   Dental Exam Up to date   Eye Exam Up to date   Exercise (times per week) 5 times per week   Current Exercise Activities Include Walking   Do you need help using the phone?  No   Are you deaf or do you have serious difficulty hearing?  No   Do you need help with transportation? No   Do you need help shopping? No   Do you need help preparing meals?  No   Do you need help with housework?  No   Do you need help with laundry? No   Do you need help taking your medications? No   Do you need help managing money? No   Do you ever drive or ride in a car without wearing a seat belt? No   Have you felt unusual stress, anger or loneliness in the last month? No   Who do you live with? Spouse   If you need help, do you have trouble finding someone available to you? No   Have you been bothered in the last four weeks by sexual problems?  No   Do you have difficulty concentrating, remembering or making decisions? No        Fall Risk Assessment  Fall Risk Assessment was completed, and patient is at low risk for falls.    Does the patient have evidence of cognitive impairment? No    Recent Lab Results:  Lab Results   Component Value Date    BUN 17 04/03/2018    CREATININE 0.80 04/03/2018    EGFRIFNONA 71 04/03/2018    BCR 21.3 04/03/2018     04/03/2018    K 4.7 04/03/2018    CO2 26.0 04/03/2018    CALCIUM 8.9 04/03/2018    ALBUMIN 4.00 04/03/2018    BILITOT 0.4 04/03/2018    ALKPHOS 103 (H) 04/03/2018    AST 25 04/03/2018    ALT 26 04/03/2018       Lab Results   Component Value Date    CHLPL 175 10/05/2015    TRIG 117 04/03/2018    HDL 48 04/03/2018       Lab Results   Component Value Date    HGBA1C 6.4 10/09/2018       Subjective     History of Present Illness  Keisha Padilla is a 69 y.o. female who presents for an Subsequent Wellness Visit.  She is needing multiple medication refills.  She is tolerating her medications well with no adverse events.  She describes good blood sugar and good blood pressure control.  She reports annual eye exams with her eye doctor.  She continues to provide good foot care and protection.  She voices no acute symptoms today.    Review of Systems   Constitutional: Negative.    HENT: Negative.    Eyes: Negative.    Respiratory: Negative.    Cardiovascular: Negative.    Gastrointestinal: Negative.    Endocrine: Negative.    Genitourinary: Negative.    Musculoskeletal: Positive for arthralgias.   Skin: Negative.    Allergic/Immunologic: Negative.    Neurological: Negative.    Hematological: Negative.    Psychiatric/Behavioral: Negative.      The following portions of the patient's history were reviewed and updated as appropriate: past medical history, past surgical history, allergies, current medications, past family history and social history.     Current Outpatient Medications:   •  aspirin 81 MG tablet, Take 1 tablet  "by mouth Daily., Disp: 90 tablet, Rfl: 3  •  budesonide-formoterol 160-4.5 MCG/ACT inhaler, Inhale 2 puffs 2 (two) times a day., Disp: 1 inhaler, Rfl: 0  •  Chlorcyclizine-Pseudoephed 25-60 MG tablet, Take 1/2 to 1 tab po every 12 hours PRN congestion, Disp: 30 tablet, Rfl: 0  •  Cholecalciferol 2000 UNITS capsule, Take 1 capsule by mouth daily., Disp: , Rfl:   •  diclofenac 75 MG EC tablet, Take 1 tablet by mouth Daily As Needed (OA Pain)., Disp: 90 tablet, Rfl: 3  •  diltiaZEM CD (CARTIA XT) 240 MG 24 hr capsule, Take 1 capsule by mouth Daily., Disp: 90 capsule, Rfl: 3  •  glipiZIDE (GLUCOTROL) 5 MG tablet, Take one po bid, Disp: 180 tablet, Rfl: 3  •  lisinopril (PRINIVIL,ZESTRIL) 20 MG tablet, Take 1 tablet by mouth Daily., Disp: 90 tablet, Rfl: 3  •  simvastatin (ZOCOR) 20 MG tablet, Take 1 tablet by mouth Every Night., Disp: 90 tablet, Rfl: 3    Objective     Visual Acuity    Visual Acuity Screening    Right eye Left eye Both eyes   Without correction:      With correction: 20/20 20/20 20/20     Vitals:    04/11/19 0801   BP: 132/70   Pulse: 65   Resp: 16   Temp: 97.8 °F (36.6 °C)   SpO2: 97%   Weight: 102 kg (224 lb 9.6 oz)   Height: 144.8 cm (57\")   PainSc:   2     Body mass index is 48.6 kg/m². Discussed the patient's BMI with her. The BMI is above average; no BMI management plan is appropriate..    Physical Exam   Constitutional: She is oriented to person, place, and time. She appears well-developed and well-nourished. She is cooperative.   HENT:   Head: Normocephalic and atraumatic.   Right Ear: Hearing and external ear normal.   Left Ear: Hearing and external ear normal.   Nose: Nose normal.   Mouth/Throat: Uvula is midline, oropharynx is clear and moist and mucous membranes are normal.   Eyes: Conjunctivae and EOM are normal. Pupils are equal, round, and reactive to light. No scleral icterus.   Neck: Trachea normal and normal range of motion. Neck supple. No JVD present. Carotid bruit is not present. No " thyromegaly present.   Cardiovascular: Normal rate, regular rhythm, normal heart sounds and intact distal pulses.   Pulmonary/Chest: Effort normal and breath sounds normal.   Abdominal: Soft. Bowel sounds are normal. There is no hepatosplenomegaly. There is no tenderness.   Musculoskeletal:        Right knee: She exhibits decreased range of motion and deformity.    Keisha had a diabetic foot exam performed today.   During the foot exam she had a monofilament test performed.  Lymphadenopathy:     She has no cervical adenopathy.   Neurological: She is alert and oriented to person, place, and time. She has normal strength and normal reflexes. No sensory deficit. Gait normal.   Skin: Skin is warm and dry.   Psychiatric: She has a normal mood and affect. Her speech is normal and behavior is normal. Judgment and thought content normal. Cognition and memory are normal.   Nursing note and vitals reviewed.    Compared to one year ago, the patient feels her physical health is the same.  Compared to one year ago, the patient feels her mental health is the same.    Age-appropriate Screening Schedule  Refer to the list below for future screening recommendations based on patient's age, sex and/or medical conditions. Orders for these recommended tests are listed in the plan section. The patient has been provided with a written plan.    Health Maintenance   Topic Date Due   • DIABETIC EYE EXAM  07/30/2019   • INFLUENZA VACCINE  08/01/2019   • HEMOGLOBIN A1C  10/11/2019   • DIABETIC FOOT EXAM  04/11/2020   • LIPID PANEL  04/11/2020   • URINE MICROALBUMIN  04/11/2020   • MAMMOGRAM  06/15/2020   • DXA SCAN  04/04/2021   • TDAP/TD VACCINES (2 - Td) 10/03/2026   • COLONOSCOPY  02/22/2028   • PNEUMOCOCCAL VACCINES (65+ LOW/MEDIUM RISK)  Completed     Advance Care Planning  We discussed advance care planning and importance of providing & updating documentation for the medical record.    Identification of Risk Factors  Risk factors include:  Cardiovascular risk;  Patient advised to start Aspirin 81 mg daily and Patient advised to follow heart healthy diet to help decrease cardiovascular risk   Diabetic Lab screening is due;   Glucose quantitative ordered.      Assessment/Plan   Patient Self-Management and Personalized Health Advice  The patient has been provided with information about: diet, exercise, weight management, prevention of cardiac or vascular disease and fall prevention and preventive services including:   · Annual Wellness Visit (AWV).    Visit Diagnoses:    ICD-10-CM ICD-9-CM   1. Medicare annual wellness visit, subsequent Z00.00 V70.0   2. Type 2 diabetes mellitus without complication, without long-term current use of insulin (CMS/[facility]) E11.9 250.00   3. Essential hypertension I10 401.9   4. Familial hypercholesterolemia E78.01 272.0   5. Primary osteoarthritis of both knees M17.0 715.16       Orders Placed This Encounter   Procedures   • Comprehensive Metabolic Panel   • Lipid Panel   • TSH   • Uric Acid   • C-reactive Protein   • Hemoglobin A1c   • CBC Auto Differential   • POC Urinalysis Dipstick, Automated   • POC Microalbumin       New Medications Ordered This Visit   Medications   • simvastatin (ZOCOR) 20 MG tablet     Sig: Take 1 tablet by mouth Every Night.     Dispense:  90 tablet     Refill:  3   • lisinopril (PRINIVIL,ZESTRIL) 20 MG tablet     Sig: Take 1 tablet by mouth Daily.     Dispense:  90 tablet     Refill:  3   • glipiZIDE (GLUCOTROL) 5 MG tablet     Sig: Take one po bid     Dispense:  180 tablet     Refill:  3   • diltiaZEM CD (CARTIA XT) 240 MG 24 hr capsule     Sig: Take 1 capsule by mouth Daily.     Dispense:  90 capsule     Refill:  3   • diclofenac (VOLTAREN) 75 MG EC tablet     Sig: Take 1 tablet by mouth Daily As Needed (OA Pain).     Dispense:  90 tablet     Refill:  3     Current outpatient and discharge medications have been reconciled for the patient.  Reviewed by: Bon Neal MD    Aspirin counseling:  We  discussed current recommendations concerning daily low dose aspirin (81 mg) use in patients with cardiovascular disease.    Reviewed use of high risk medication in the elderly: Not applicable.  Reviewed for potential of harmful drug interactions in the elderly: Not applicable.    Follow Up:  Return in about 6 months (around 10/11/2019), or if symptoms worsen or fail to improve, for Recheck.     An After Visit Summary and PPPS with all of these plans were given to the patient.         Bon Neal MD 04/11/19 8:31 AM

## 2019-05-16 ENCOUNTER — OFFICE VISIT (OUTPATIENT)
Dept: FAMILY MEDICINE CLINIC | Facility: CLINIC | Age: 69
End: 2019-05-16

## 2019-05-16 VITALS
BODY MASS INDEX: 48.11 KG/M2 | HEIGHT: 57 IN | RESPIRATION RATE: 18 BRPM | TEMPERATURE: 99 F | SYSTOLIC BLOOD PRESSURE: 140 MMHG | HEART RATE: 78 BPM | WEIGHT: 223 LBS | DIASTOLIC BLOOD PRESSURE: 80 MMHG | OXYGEN SATURATION: 97 %

## 2019-05-16 DIAGNOSIS — J01.00 ACUTE MAXILLARY SINUSITIS, RECURRENCE NOT SPECIFIED: Primary | ICD-10-CM

## 2019-05-16 PROCEDURE — 99213 OFFICE O/P EST LOW 20 MIN: CPT | Performed by: FAMILY MEDICINE

## 2019-05-16 RX ORDER — BENZONATATE 100 MG/1
100 CAPSULE ORAL 3 TIMES DAILY PRN
Qty: 15 CAPSULE | Refills: 1 | Status: SHIPPED | OUTPATIENT
Start: 2019-05-16 | End: 2019-07-30

## 2019-05-16 RX ORDER — AMOXICILLIN 500 MG/1
500 CAPSULE ORAL 2 TIMES DAILY
Qty: 20 CAPSULE | Refills: 0 | Status: SHIPPED | OUTPATIENT
Start: 2019-05-16 | End: 2019-07-30

## 2019-05-16 NOTE — PROGRESS NOTES
"Subjective   Keisha Padilla is a 69 y.o. female seen today for Sinusitis.     Sinusitis   This is a new problem. The current episode started in the past 7 days. The problem is unchanged. Maximum temperature: low grade. Her pain is at a severity of 6/10. The pain is moderate. Associated symptoms include chills, coughing, sinus pressure and a sore throat. Pertinent negatives include no shortness of breath.        The following portions of the patient's history were reviewed and updated as appropriate: allergies, current medications, past social history and problem list.    Review of Systems   Constitutional: Positive for chills.   HENT: Positive for postnasal drip, sinus pressure, sinus pain and sore throat.    Respiratory: Positive for cough. Negative for shortness of breath.    Cardiovascular: Negative for chest pain.       Objective   /80   Pulse 78   Temp 99 °F (37.2 °C)   Resp 18   Ht 144.8 cm (57\")   Wt 101 kg (223 lb)   SpO2 97%   BMI 48.26 kg/m²   Physical Exam   Constitutional: She appears well-developed and well-nourished.   HENT:   Right Ear: External ear normal.   Left Ear: External ear normal.   Nose: Right sinus exhibits maxillary sinus tenderness. Left sinus exhibits maxillary sinus tenderness.   Mouth/Throat: Oropharynx is clear and moist.   Cardiovascular: Normal rate and regular rhythm.   Pulmonary/Chest: Effort normal and breath sounds normal.   Nursing note and vitals reviewed.      Assessment/Plan   Problem List Items Addressed This Visit     None      Visit Diagnoses     Acute maxillary sinusitis, recurrence not specified    -  Primary              Drink plenty fluids.  Use OTC Afrin nasal spray. Place 3 sprays in each nostril twice a day for no more than 4 days. Follow 5 minutes after with OTC saline nasal spray.    Rx for Benzonate 100 mg  three times a day #15+1.  Rx for Amoxicillin 500 mg twice a day #20+0.    Follow up as needed.              Scribed for Dr Alex Angel by " Ange Haywood CMA.          I, Alex Angel MD, personally performed the services described in this documentation, as scribed by Ange Haywood in my presence, and is both accurate and complete.        (Please note that portions of this note were completed with a voice recognition program. Efforts were made to edit the dictations,but occasionally words are mis transcribed.)

## 2019-06-18 ENCOUNTER — HOSPITAL ENCOUNTER (OUTPATIENT)
Dept: MAMMOGRAPHY | Facility: HOSPITAL | Age: 69
Discharge: HOME | End: 2019-06-18
Attending: FAMILY MEDICINE | Admitting: FAMILY MEDICINE

## 2019-06-18 DIAGNOSIS — Z12.31 VISIT FOR SCREENING MAMMOGRAM: ICD-10-CM

## 2019-06-18 PROCEDURE — 77067 SCR MAMMO BI INCL CAD: CPT | Performed by: RADIOLOGY

## 2019-06-18 PROCEDURE — 77067 SCR MAMMO BI INCL CAD: CPT

## 2019-06-18 PROCEDURE — 77063 BREAST TOMOSYNTHESIS BI: CPT | Performed by: RADIOLOGY

## 2019-06-18 PROCEDURE — 77063 BREAST TOMOSYNTHESIS BI: CPT

## 2019-06-24 ENCOUNTER — HOSPITAL ENCOUNTER (OUTPATIENT)
Dept: MAMMOGRAPHY | Facility: HOSPITAL | Age: 69
Discharge: HOME | End: 2019-06-24

## 2019-06-24 DIAGNOSIS — Z12.31 VISIT FOR SCREENING MAMMOGRAM: ICD-10-CM

## 2019-07-30 ENCOUNTER — OFFICE VISIT (OUTPATIENT)
Dept: GYNECOLOGIC ONCOLOGY | Facility: CLINIC | Age: 69
End: 2019-07-30

## 2019-07-30 VITALS
DIASTOLIC BLOOD PRESSURE: 60 MMHG | WEIGHT: 223 LBS | RESPIRATION RATE: 16 BRPM | OXYGEN SATURATION: 99 % | BODY MASS INDEX: 48.26 KG/M2 | HEART RATE: 79 BPM | SYSTOLIC BLOOD PRESSURE: 134 MMHG

## 2019-07-30 DIAGNOSIS — Z01.419 WELL WOMAN EXAM: ICD-10-CM

## 2019-07-30 DIAGNOSIS — C54.1 ENDOMETRIAL CANCER (HCC): Primary | ICD-10-CM

## 2019-07-30 DIAGNOSIS — Z85.42 HISTORY OF ENDOMETRIAL CANCER: ICD-10-CM

## 2019-07-30 PROCEDURE — 99213 OFFICE O/P EST LOW 20 MIN: CPT | Performed by: NURSE PRACTITIONER

## 2019-07-30 NOTE — PROGRESS NOTES
GYN ONCOLOGY CANCER SURVEILLANCE FOLLOW-UP    Keisha Padilla  [id number]  1950    Chief Complaint: Follow-up (no gyn complaints)        History of present illness:  Keisha Padilla is a 69 y.o. year old female who is here today for ongoing surveillance of Endometrial Cancer, see Cancer History. She is approaching 5 years from completion of treatment this fall. She reports she is feeling very well today and has no complaints. She denies vaginal bleeding, pelvic pain, and changes in bowel or bladder function. She would like to initiate care with general gynecology for ongoing annual exams in the future.         Cancer History:      Endometrial cancer (CMS/HCC)    2014 Surgery     TVH/LSO for invasive endometrial cancer, [id number] grade 1            Past Medical History:   Diagnosis Date   • Asthma    • Chronic venous insufficiency    • Contusion of breast, left 2016   • Diabetes mellitus (CMS/HCC)    • Endometrial cancer (CMS/HCC) 2017   • Hyperlipidemia    • Hypertension    • Morbid obesity (CMS/HCC)    • Osteoarthritis of knee    • Varicose veins of legs    • Vitamin D deficiency        Past Surgical History:   Procedure Laterality Date   •  SECTION     • COLONOSCOPY     • HEMORRHOIDECTOMY     • HYSTERECTOMY     • OOPHORECTOMY     • TONSILLECTOMY AND ADENOIDECTOMY         MEDICATIONS: The current medication list was reviewed and reconciled.     Allergies:  is allergic to augmentin [amoxicillin-pot clavulanate] and sulfa antibiotics.    Family History   Problem Relation Age of Onset   • Pancreatitis Mother    • Alzheimer's disease Father    • Breast cancer Neg Hx    • Ovarian cancer Neg Hx        Last imaging study was CT c/a/p 2016.     Review of Systems   Constitutional: Negative for appetite change, chills, fatigue, fever and unexpected weight change.   Respiratory: Negative for cough, shortness of breath and wheezing.    Cardiovascular: Negative for chest  pain, palpitations and leg swelling.   Gastrointestinal: Negative for abdominal distention, abdominal pain, blood in stool, constipation, diarrhea, nausea and vomiting.   Endocrine: Negative.    Genitourinary: Negative for dysuria, frequency, genital sores, hematuria, pelvic pain, urgency, vaginal bleeding, vaginal discharge and vaginal pain.   Musculoskeletal: Negative for arthralgias, gait problem and joint swelling.   Neurological: Negative for dizziness, seizures, syncope, weakness, light-headedness, numbness and headaches.   Hematological: Negative for adenopathy.   Psychiatric/Behavioral: Negative.        Physical Exam  Vital Signs: /60   Pulse 79   Resp 16   Wt 101 kg (223 lb)   SpO2 99%   BMI 48.26 kg/m²    Wt Readings from Last 10 Encounters:   07/30/19 101 kg (223 lb)   05/16/19 101 kg (223 lb)   04/11/19 102 kg (224 lb 9.6 oz)   01/29/19 101 kg (222 lb)   01/28/19 101 kg (223 lb)   10/09/18 101 kg (223 lb 9.6 oz)   07/23/18 101 kg (223 lb)   07/13/18 99.8 kg (220 lb)   04/03/18 102 kg (225 lb)   01/22/18 102 kg (225 lb)       Pain Score    07/30/19 0906   PainSc: 0-No pain      General Appearance:  alert, cooperative, no apparent distress, appears stated age and obese   Neurologic/Psychiatric: A&O x 3, gait steady, appropriate affect   HEENT:  Normocephalic, without obvious abnormality, mucous membranes moist   Lungs:   Clear to auscultation bilaterally; respirations regular, even, and unlabored bilaterally   Heart:  Regular rate and rhythm, no murmurs appreciated   Breasts:  deferred   Abdomen:   Soft, non-tender, non-distended, no organomegaly and Exam limited d/t habitus.   Lymph nodes: No cervical, supraclavicular, inguinal adenopathy noted   Extremities: Normal, atraumatic; no clubbing, cyanosis, or edema    Pelvic: External Genitalia  without lesions or skin changes  Vagina  is pink, moist, without lesions.   Vaginal Cuff  Female Vaginal Cuff: smooth, intact and without visible  lesions  Uterus  surgically absent and no palpable masses  Ovaries  surgically absent bilaterallly  Parametria  smooth  Rectovaginal  Female rectovaginal: confirms no masses or bleeding     ECOG Performance Status: 0 - Asymptomatic    Procedure Note:  No notes on file      Assessment and Plan:  Keisha was seen today for follow-up.    Diagnoses and all orders for this visit:    Endometrial cancer (CMS/HCC)        There is no evidence of disease upon today's exam. She is now 5 years from completion of treatment and doing well. She may return to regular care and see us on an as needed basis. Patient's daughter sees Dr. Lombardo of [facility] and patient would like to see the same office. We will of course remain available to her for any needs or complications in the future.       Return to clinic on an as needed basis.       PRIMITIVO Chance

## 2019-10-11 ENCOUNTER — OFFICE VISIT (OUTPATIENT)
Dept: FAMILY MEDICINE CLINIC | Facility: CLINIC | Age: 69
End: 2019-10-11

## 2019-10-11 VITALS
HEART RATE: 88 BPM | RESPIRATION RATE: 16 BRPM | SYSTOLIC BLOOD PRESSURE: 136 MMHG | WEIGHT: 224.6 LBS | TEMPERATURE: 97.3 F | OXYGEN SATURATION: 97 % | BODY MASS INDEX: 48.46 KG/M2 | DIASTOLIC BLOOD PRESSURE: 82 MMHG | HEIGHT: 57 IN

## 2019-10-11 DIAGNOSIS — E78.01 FAMILIAL HYPERCHOLESTEROLEMIA: ICD-10-CM

## 2019-10-11 DIAGNOSIS — E11.9 TYPE 2 DIABETES MELLITUS WITHOUT COMPLICATION, WITHOUT LONG-TERM CURRENT USE OF INSULIN (HCC): Primary | ICD-10-CM

## 2019-10-11 DIAGNOSIS — I10 ESSENTIAL HYPERTENSION: ICD-10-CM

## 2019-10-11 LAB
EXPIRATION DATE: NORMAL
HBA1C MFR BLD: 6.5 %
Lab: NORMAL

## 2019-10-11 PROCEDURE — 83036 HEMOGLOBIN GLYCOSYLATED A1C: CPT | Performed by: FAMILY MEDICINE

## 2019-10-11 PROCEDURE — 99214 OFFICE O/P EST MOD 30 MIN: CPT | Performed by: FAMILY MEDICINE

## 2019-10-11 NOTE — PROGRESS NOTES
Subjective   Keisha Padilla is a 69 y.o. female.     History of Present Illness   The patient is here to follow up on her chronic medical problems which include DM2, HTN and HLD. The patient is tolerating all of her medications well with no adverse events. She describes compliance with a healthy heart, diabetic diet.  She reports compliance with daily aerobic activity.  She reports an annual eye exam < one year with good results.  She conutinues to adhere to routine blood sugar monitoring and reports good results.  Blood sugars are typically under 135.  Adherence to routine blood pressure monitoring is reported. The patient reports ongoing good foot care and protection. The patient is needing multiple medication refills.  The patient is anticipating A1c lab evaluation.  No acute symptoms are reported today.  She has had her annual flu shot.    Review of Systems   Constitutional: Negative for chills and fever.   Eyes: Negative for visual disturbance.   Respiratory: Negative for cough and shortness of breath.    Cardiovascular: Negative for chest pain, palpitations and leg swelling.   Gastrointestinal: Negative for abdominal pain, diarrhea, nausea and vomiting.   Endocrine: Negative for polydipsia, polyphagia and polyuria.   Genitourinary: Negative for difficulty urinating.   Musculoskeletal: Positive for arthralgias.   Skin: Negative for rash.   Neurological: Negative for headaches.   Psychiatric/Behavioral: The patient is not nervous/anxious.        Objective   Physical Exam   Constitutional: She is oriented to person, place, and time. She appears well-developed and well-nourished.   HENT:   Head: Normocephalic and atraumatic.   Right Ear: Hearing normal.   Left Ear: Hearing normal.   Mouth/Throat: Mucous membranes are normal.   Eyes: Conjunctivae and EOM are normal. Pupils are equal, round, and reactive to light.   Neck: Neck supple. No JVD present. No thyromegaly present.   Cardiovascular: Normal rate, regular  rhythm and normal heart sounds.   Pulmonary/Chest: Effort normal and breath sounds normal.   Musculoskeletal: Normal range of motion.        Right knee: She exhibits deformity.        Left knee: She exhibits deformity.   Neurological: She is alert and oriented to person, place, and time. She has normal strength. No sensory deficit. Gait normal.   Skin: Skin is warm and dry.   Psychiatric: She has a normal mood and affect. Her behavior is normal. Judgment and thought content normal. Cognition and memory are normal.   Nursing note and vitals reviewed.      Assessment/Plan   Diagnoses and all orders for this visit:    Type 2 diabetes mellitus without complication, without long-term current use of insulin (CMS/[facility])  -     POC Glycosylated Hemoglobin (Hb A1C) is 6.50% today  - Glipizide 5 mg po BID  - Routine blood sugar monitoring  - Diabetic diet  - Weight loss  - Continue with daily aerobic activity  - We discussed statins and ace inhibitor therapy  - Annual eye exams are encouraged  - Continue with good foot care and protection    Essential hypertension  - Lisinopril 20 mg po once daily  - Healthy heart diet / DASH diet  - Low dose aspirin once daily  - Low sodium diet  - Daily aerobic exercise > 40' > 3 x / week  - Routine blood pressure monitoring  - [place] Heart Disease and Stroke Prevention Task Force pamphlet reviewed and administered.    Familial hypercholesterolemia  - Simvastatin 20 mg po once daily  - Low cholesterol diet (< 200 mg / day)  - Daily aerobic exercise  - Aspirin 81 mg po once daily    RTC in 6 months or sooner as needed.

## 2019-12-09 ENCOUNTER — HOSPITAL ENCOUNTER (OUTPATIENT)
Dept: PHYSICAL THERAPY | Age: 69
Discharge: HOME | End: 2019-12-09
Payer: MEDICARE

## 2019-12-09 PROCEDURE — 97530 THERAPEUTIC ACTIVITIES: CPT

## 2019-12-09 PROCEDURE — 97162 PT EVAL MOD COMPLEX 30 MIN: CPT

## 2019-12-09 PROCEDURE — 97014 ELECTRIC STIMULATION THERAPY: CPT

## 2019-12-09 NOTE — PROGRESS NOTES
Derek Hollingsworth [address]  [facility] [place] [facility]  [facility]  Shaji Cantu [facility], [address] ,[address], [address]  Phone: [phone]  Fax: [phone] / [address] [address] PHYSICAL THERAPY SERVICES  Patient Name: Juan Mo : 1950   Medical   Diagnosis: Low back pain [M54.5] Treatment Diagnosis: LBP   Onset Date: 10-09-19     Referral Source: Sydnie Perry MD [facility] of [facility]): 2019   Prior Hospitalization: See medical history Provider #: [id number]   Prior Level of Function: No c/o pain with ADLs   Comorbidities: H/o osteopenia, hypothyroidism, HM, neuropathy, c/s fusion 89., L4/5, L5-S1 fusion    Medications: Verified on Patient Summary List   The Plan of Care and following information is based on the information from the initial evaluation.   ==================================================================================  Assessment / key information:  Patient is a pleasant 68 y.o. female who presents to [facility] with LBP. Patient reports initial onset of sx on 10-09-19, she believes this could be due to her shingles injection on 10-08-19 although she reports no other change in level of activity & injury. Current c/o pain are intermittent in nature as well as constant c/o \"tightness\" in her back as well as intermittent c/o pain into R distal glut which started ~ 1.5 weeks ago & worsen with activities such as sitting in her car or a straight backed chair, rising from chairs & after cleaning her shower when she bending over. Sx improve with use of heat & use of current home program from chiropractor which involves RANDELL positioning & ELIZABETH. Average reported pain level at 5-6/10, 9-10/10 at worst & 1-2/10 at best.  Upon objective evaluation, patient demonstrates l/s ROM as follows FIS 75%, EIS 25% B SGIS was not assessed today. Slump test was (+) on R for reproduction of R LE pain into distal glut. MMT was [facility] today. Immediate reduction of pain with use of l/s roll to correct sitting posture. Patient can benefit PT interventions to improve posture, decrease pain & centralize R LE to facilitate return to unlimited ADLs, work activities & overall functional status.   ==================================================================================  Eval Complexity: History HIGH Complexity :3+ comorbidities / personal factors will impact the outcome/ POC ;  Examination  HIGH Complexity : 4+ Standardized tests and measures addressing body structure, function, activity limitation and / or participation in recreation ; Presentation MEDIUM Complexity : Evolving with changing characteristics ; Decision Making MEDIUM Complexity : FOTO score of 26-74; Overall Complexity MEDIUM  Problem List: pain affecting function, decrease ROM, decrease strength, edema affecting function, impaired gait/ balance, decrease ADL/ functional abilitiies, decrease activity tolerance, decrease flexibility/ joint mobility, decrease transfer abilities and other FOTO 47 points   Treatment Plan may include any combination of the following: Therapeutic exercise, Therapeutic activities, Neuromuscular re-education, Physical agent/modality, Gait/balance training, Manual therapy, Aquatic therapy, Patient education, Self Care training, Functional mobility training, Home safety training and Stair training  Patient / Family readiness to learn indicated by: asking questions, trying to perform skills and interest  Persons(s) to be included in education: patient (P)  Barriers to Learning/Limitations: None  Measures taken:    Patient Goal (s): \"to have no pain, tightness, burning in my back, gluts\"   Patient self reported health status: good  Rehabilitation Potential: good   Short Term Goals: To be accomplished in  2  weeks:  1) Establish HEP to prevent further disability.     2) Patient will report decreased c/o pain to < or = 3-4/10 to facilitate rising from chairs with manageable sx in l/s. 3) Improve FOTO score from 47 points to > or = 52 points indicating improved tolerance with ADLs in regards to l/s. 4) Patient will be able to demonstrate the appropriate sitting posture with or without use of l/s roll to facilitate sitting activities at home/work.  Long Term Goals: To be accomplished in  4  weeks:  1) Improve FOTO score from 52 points to > or = 57 points indicating improved tolerance with ADLs in regards to l/s. 2) Patient to report 75% improvement in overall function in preparation for return to recreational activities with manageable sx in lower back. 3) Patient will demonstrate (-) neural tension with Slump test on R LE to facilitate driving activities with manageable sx. 4) Centralize R LE sx to level of l/s to facilitate rising from chairs without peripheralization of sx. Frequency / Duration:   Patient to be seen  2-3  times per week for 4  weeks:  Patient / Caregiver education and instruction: self care, activity modification, brace/ splint application and exercises    Therapist Signature: TRICIA Arnold, cert MDT Date: 68/5/4897   Certification Period: 12-09-19 to 3-07-20 Time: 9:17 AM   ==================================================================================  I certify that the above Physical Therapy Services are being furnished while the patient is under my care. I agree with the treatment plan and certify that this therapy is necessary. Physician Signature:        Date:       Time:     Please sign and return to [facility] [place] or you may fax the signed copy to [phone]. Thank you.

## 2019-12-09 NOTE — PROGRESS NOTES
PHYSICAL THERAPY - DAILY TREATMENT NOTE    Patient Name: Doris Tejeda        Date: 2019  : 1950   YES Patient  Verified  Visit #:     Insurance: Payor: [place] MEDICARE / Plan: VA MEDICARE PART A & B / Product Type: Medicare /      In time: 9:15 A Out time: 10:10 A   Total Treatment Time: 50     BCBS/Medicare Time Tracking (below)   Total Timed Codes (min):  50 1:1 Treatment Time:  50     TREATMENT AREA =  Low back pain [M54.5]  SUBJECTIVE  Pain Level (on 0 to 10 scale):  4  / 10   Medication Changes/New allergies or changes in medical history, any new surgeries or procedures?     NO    If yes, update Summary List   Subjective Functional Status/Changes:  []  No changes reported     See POC           Modalities Rationale:     decrease pain to improve patient's ability to return to pain-free ADLs  15 min [x] Estim, type/location:  IFC to l/s in supine                                    []  att     [x]  unatt     []  w/US     []  w/ice    []  w/heat    min []  Mechanical Traction: type/lbs                   []  pro   []  sup   []  int   []  cont    []  before manual    []  after manual    min []  Ultrasound, settings/location:      min []  Iontophoresis w/ dexamethasone, location:                                               []  take home patch       []  in clinic    min []  Ice     []  Heat    location/position:     min []  Vasopneumatic Device, press/temp:     min []  Other:    [] Skin assessment post-treatment (if applicable):    [x]  intact    []  redness- no adverse reaction     []redness  adverse reaction:            10 min Therapeutic Activity: [x]  See flow sheet   Rationale:    increase strength, improve balance and increase proprioception to improve the patients ability to return to pain-free sit to stand transfers     Billed With/As:   [] TE   [] TA   [] Neuro   [] Self Care Patient Education: [x] Review HEP    [] Progressed/Changed HEP based on:   [] positioning   [] body mechanics [] transfers   [] heat/ice application    [] other:      Other Objective/Functional Measures:    Reviewed current HEP from chiropractor (RANDELL positioning, ELIZABETH & use of l/s roll in sitting      Post Treatment Pain Level (on 0 to 10) scale:   3  / 10     ASSESSMENT  Assessment/Changes in Function:     See POC      []  See Progress Note/Recertification   Patient will continue to benefit from skilled PT services to modify and progress therapeutic interventions, address functional mobility deficits, address ROM deficits, analyze and cue movement patterns, analyze and modify body mechanics/ergonomics, assess and modify postural abnormalities and instruct in home and community integration to attain remaining goals.    Progress toward goals / Updated goals:    Progressing towards goals established at [address]  []  Upgrade activities as tolerated YES Continue plan of care   []  Discharge due to :    []  Other:      Therapist: Avery Grimaldo, PT    Date: 12/9/2019 Time: 3:01 PM     Future Appointments   Date Time Provider Morgan Candelario   12/12/2019  8:00 AM Ewelina Madrid PT [facility]   12/16/2019  2:30 PM Faith Higginbotham [facility]   12/20/2019  9:30 AM Ewelina Madrid PT [facility]   12/26/2019  4:00 PM Faith Higginbotham [facility]   12/30/2019  8:30 AM Silvino Lou, PT [facility]

## 2019-12-12 ENCOUNTER — HOSPITAL ENCOUNTER (OUTPATIENT)
Dept: PHYSICAL THERAPY | Age: 69
Discharge: HOME | End: 2019-12-12
Payer: MEDICARE

## 2019-12-12 PROCEDURE — 97110 THERAPEUTIC EXERCISES: CPT

## 2019-12-12 PROCEDURE — 97014 ELECTRIC STIMULATION THERAPY: CPT

## 2019-12-12 NOTE — PROGRESS NOTES
PHYSICAL THERAPY - DAILY TREATMENT NOTE    Patient Name: Jacob Madera        Date: 2019  : 1950   YES Patient  Verified  Visit #:     Insurance: Payor: [employer] MEDICARE / Plan: VA MEDICARE PART A & B / Product Type: Medicare /      In time: 8:00 A Out time: 9:00 A   Total Treatment Time: 55     BCBS/Medicare Time Tracking (below)   Total Timed Codes (min):  55 1:1 Treatment Time:  50     TREATMENT AREA = Low back pain [M54.5]    SUBJECTIVE  Pain Level (on 0 to 10 scale):  4  / 10   Medication Changes/New allergies or changes in medical history, any new surgeries or procedures? NO    If yes, update Summary List   Subjective Functional Status/Changes:  []  No changes reported     Patient reports her chiropractor gave her two new exercises, pulling her knee to her chest & she had terrible pain down her leg down to lower leg and had a hard time walking. She sitting better in recliner after recommendations to use l/s roll & not prop legs up.            Modalities Rationale:     decrease pain to improve patient's ability to return to pain-free standing  10 min [x] Estim, type/location:  IFC to l/s in supine                                    []  att     [x]  unatt     []  w/US     []  w/ice    [x]  w/heat    min []  Mechanical Traction: type/lbs                   []  pro   []  sup   []  int   []  cont    []  before manual    []  after manual    min []  Ultrasound, settings/location:      min []  Iontophoresis w/ dexamethasone, location:                                               []  take home patch       []  in clinic    min []  Ice     []  Heat    location/position:     min []  Vasopneumatic Device, press/temp:     min []  Other:    [] Skin assessment post-treatment (if applicable):    [x]  intact    [x]  redness- no adverse reaction     []redness  adverse reaction:        45/  40 min Therapeutic Exercise:  [x]  See flow sheet   Rationale:      increase ROM and increase strength to improve the patients ability to return to pain-free sit to stand transfers     Billed With/As:   [] TE   [] TA   [] Neuro   [] Self Care Patient Education: [x] Review HEP    [] Progressed/Changed HEP based on:   [] positioning   [] body mechanics   [] transfers   [] heat/ice application    [] other:      Other Objective/Functional Measures: Added therapeutic exercise per flow sheet     Post Treatment Pain Level (on 0 to 10) scale:   3  / 10     ASSESSMENT  Assessment/Changes in Function:     R LE pain centralized to R hip after PT today, good tolerance to initial program, issued updated HEP (see pictures)     []  See Progress Note/Recertification   Patient will continue to benefit from skilled PT services to modify and progress therapeutic interventions to attain remaining goals.    Progress toward goals / Updated goals:    Progressing towards goals established at [address] [facility] [id number]  []  Upgrade activities as tolerated YES Continue plan of care   []  Discharge due to :    []  Other:      Therapist: Miller Mae PT    Date: 12/12/2019 Time: 9:19 AM     Future Appointments   Date Time Provider Morgan Candelario   12/16/2019  2:30 PM Kriss Mathew [facility]   12/20/2019  9:30 AM Angelina Morrison, PT [facility]   [facility]26[facility]2019  [facility]   12/30/2019  8:30 AM Lee Ann Lou, PT [facility]

## 2019-12-13 ENCOUNTER — APPOINTMENT (OUTPATIENT)
Dept: PHYSICAL THERAPY | Age: 69
End: 2019-12-13
Payer: MEDICARE

## 2019-12-16 ENCOUNTER — HOSPITAL ENCOUNTER (OUTPATIENT)
Dept: PHYSICAL THERAPY | Age: 69
Discharge: HOME | End: 2019-12-16
Payer: MEDICARE

## 2019-12-16 PROCEDURE — 97014 ELECTRIC STIMULATION THERAPY: CPT

## 2019-12-16 PROCEDURE — 97110 THERAPEUTIC EXERCISES: CPT

## 2019-12-16 NOTE — PROGRESS NOTES
PHYSICAL THERAPY - DAILY TREATMENT NOTE    Patient Name: Iven Aschoff        Date: 2019  : 1950   YES Patient  Verified  Visit #:   3   of   12  Insurance: Payor: Suzette George / Plan: VA MEDICARE PART A & B / Product Type: Medicare /      In time: 230 Out time: 340   Total Treatment Time: 70     BCBS/Medicare Time Tracking (below)   Total Timed Codes (min):  70 1:1 Treatment Time:  40     TREATMENT AREA =  Low back pain [M54.5]    SUBJECTIVE  Pain Level (on 0 to 10 scale):  7  / 10   Medication Changes/New allergies or changes in medical history, any new surgeries or procedures? NO    If yes, update Summary List   Subjective Functional Status/Changes:  []  No changes reported     \"I rode to [place] on Friday and sat for over 4 hours and I was dying in pain. Ever since both legs have been tingling and painful. \"            Modalities Rationale:     decrease pain to improve patient's ability to perform pain free transfers  15 min [x] Estim, type/location: MHP to l/s in supine                                      []  att     [x]  unatt     []  w/US     []  w/ice    [x]  w/heat    min []  Mechanical Traction: type/lbs                   []  pro   []  sup   []  int   []  cont    []  before manual    []  after manual    min []  Ultrasound, settings/location:      min []  Iontophoresis w/ dexamethasone, location:                                               []  take home patch       []  in clinic    min []  Ice     []  Heat    location/position:     min []  Vasopneumatic Device, press/temp:     min []  Other:    [x] Skin assessment post-treatment (if applicable):    [x]  intact    [x]  redness- no adverse reaction     []redness  adverse reaction:        55/40 min Therapeutic Exercise:  [x]  See flow sheet   Rationale:      increase ROM, increase strength, improve coordination, improve balance and increase proprioception to improve the patients ability to perform unlimited ADLs      Billed With/As: [] TE   [] TA   [] Neuro   [] Self Care Patient Education: [x] Review HEP    [] Progressed/Changed HEP based on:   [] positioning   [] body mechanics   [] transfers   [] heat/ice application    [] other:      Other Objective/Functional Measures:    Reviewed use of log roll to perform sit <-> supine in order to prevent pain with transfers, reviewed maintaining neutral spine during lifting/ bending to prevent increased forces throughout lumbar spine ( patient reports being straight over to wip etiles every day in her shower), reviewed use of back brace for support during prolonged sitting, house work, shopping. Post Treatment Pain Level (on 0 to 10) scale:   1  / 10     ASSESSMENT  Assessment/Changes in Function:     Patient demonstrated good isolation of TA today after coaching and practice, required moderate cues to maintain neutral spine throughout. []  See Progress Note/Recertification   Patient will continue to benefit from skilled PT services to modify and progress therapeutic interventions, address functional mobility deficits, address ROM deficits, address strength deficits, analyze and address soft tissue restrictions, analyze and cue movement patterns, analyze and modify body mechanics/ergonomics, assess and modify postural abnormalities, address imbalance/dizziness and instruct in home and community integration to attain remaining goals. Progress toward goals / Updated goals:    Progressing towards LTG 2.       PLAN  [x]  Upgrade activities as tolerated YES Continue plan of care   []  Discharge due to :    []  Other:      Therapist: Jennifer Jesus    Date: 12/16/2019 Time: 4:08 PM     Future Appointments   Date Time Provider Morgan Candelario   12/20/2019  9:30 AM Karena Mckinnon, PT [facility]   12/26/2019  4:00 PM Nayeli Courtney [facility]   12/30/2019  8:30 AM Bart Lou, PT [facility]

## 2019-12-20 ENCOUNTER — HOSPITAL ENCOUNTER (OUTPATIENT)
Dept: PHYSICAL THERAPY | Age: 69
Discharge: HOME | End: 2019-12-20
Payer: MEDICARE

## 2019-12-20 PROCEDURE — 97110 THERAPEUTIC EXERCISES: CPT

## 2019-12-20 PROCEDURE — 97014 ELECTRIC STIMULATION THERAPY: CPT

## 2019-12-20 NOTE — PROGRESS NOTES
PHYSICAL THERAPY - DAILY TREATMENT NOTE    Patient Name: Maria Del Rosario Siddiqi        Date: 2019  : 1950   YES Patient  Verified  Visit #:     Insurance: Payor: Oscar Wade / Plan: VA MEDICARE PART A & B / Product Type: Medicare /      In time: 9:35 A Out time: 10:30 A   Total Treatment Time: 35     BCBS/Medicare Time Tracking (below)   Total Timed Codes (min):  35 1:1 Treatment Time:  35     TREATMENT AREA = Low back pain [M54.5]    SUBJECTIVE  Pain Level (on 0 to 10 scale):  10  / 10   Medication Changes/New allergies or changes in medical history, any new surgeries or procedures? NO    If yes, update Summary List   Subjective Functional Status/Changes:  []  No changes reported     Patient reports her pain is the same, she is wearing her back brace which makes he LBP better but does not affect her R leg, tingling all the way down into her lower leg.             Modalities Rationale:     decrease pain to improve patient's ability to return to pain-free ADLs   15 min [x] Estim, type/location: IFC to l/s in supine                                     []  att     [x]  unatt     []  w/US     []  w/ice    [x]  w/heat    min []  Mechanical Traction: type/lbs                   []  pro   []  sup   []  int   []  cont    []  before manual    []  after manual    min []  Ultrasound, settings/location:      min []  Iontophoresis w/ dexamethasone, location:                                               []  take home patch       []  in clinic    min []  Ice     []  Heat    location/position:     min []  Vasopneumatic Device, press/temp:     min []  Other:    [] Skin assessment post-treatment (if applicable):    [x]  intact    []  redness- no adverse reaction     []redness  adverse reaction:        20 min Therapeutic Exercise:  [x]  See flow sheet   Rationale:      increase ROM and increase strength to improve the patients ability to return to pain-free bed mobility      Billed With/As:   [] TE   [] TA   [] Neuro   [] Self Care Patient Education: [x] Review HEP    [] Progressed/Changed HEP based on:   [] positioning   [] body mechanics   [] transfers   [] heat/ice application    [] other:      Other Objective/Functional Measures:    Held therapeutic exercise per flow sheet given increased pain levels today      Post Treatment Pain Level (on 0 to 10) scale:   6  / 10     ASSESSMENT  Assessment/Changes in Function:     RANDELL positioning & ELIZABETH had NE on LBP or R LE sx, PPT increased c/o LBP today, emphasis on l/s stabilization/neutral spine     []  See Progress Note/Recertification   Patient will continue to benefit from skilled PT services to modify and progress therapeutic interventions, address functional mobility deficits, address ROM deficits, address strength deficits, analyze and modify body mechanics/ergonomics, assess and modify postural abnormalities and instruct in home and community integration to attain remaining goals.    Progress toward goals / Updated goals:    No progress or change in sx since last PT     PLAN  []  Upgrade activities as tolerated YES Continue plan of care   []  Discharge due to :    [x]  Other: Patient to call ortho MD regarding increase in LBP     Therapist: Danny Rico, PT    Date: 12/20/2019 Time: 11:26 AM     Future Appointments   Date Time Provider Morgan Candelario   12/26/2019  4:00 [facility]   12/30/2019  8:30 AM Finn Lou, PT [facility]

## 2019-12-26 ENCOUNTER — APPOINTMENT (OUTPATIENT)
Dept: PHYSICAL THERAPY | Age: 69
End: 2019-12-26
Payer: MEDICARE

## 2019-12-30 ENCOUNTER — APPOINTMENT (OUTPATIENT)
Dept: PHYSICAL THERAPY | Age: 69
End: 2019-12-30
Payer: MEDICARE

## 2020-01-14 ENCOUNTER — HOSPITAL ENCOUNTER (OUTPATIENT)
Dept: PHYSICAL THERAPY | Age: 70
Discharge: HOME | End: 2020-01-14
Payer: MEDICARE

## 2020-01-14 PROCEDURE — 97110 THERAPEUTIC EXERCISES: CPT

## 2020-01-14 PROCEDURE — 97014 ELECTRIC STIMULATION THERAPY: CPT

## 2020-01-14 NOTE — PROGRESS NOTES
Derek Hollingsworth [address]  [facility]  [facility]  Shaji Cantu [facility], [address] ,[address], [address]  Phone: [phone]  Fax: [phone] OF CARE/RECERTIFICATION FOR PHYSICAL THERAPY          Patient Name: Luis Enrique Fuller : 1950   Treatment/Medical Diagnosis: Low back pain [M54.5]   Onset Date: 10-09-19    Referral Source: Maria E Perez MD Start of [facility]): 19   Prior Hospitalization: See Medical History Provider #: [id number]   Prior Level of Function: No c/o pain with ADLs   Comorbidities: H/o osteopenia, hypothyroidism, HM, neuropathy, c/s fusion 89., L4/5, L5-S1 fusion    Medications: Verified on Patient Summary List   Visits from [facility]: 5 Missed Visits: 0     Goal/Measure of Progress Goal Met? 1.  Establish HEP to prevent further disability. Status at last Eval: NA Current Status: HEP established  yes   2. Patient will report decreased c/o pain to < or = 3-4/10 to facilitate rising from chairs with manageable sx in l/s. Status at last Eval: Average 5-6/10  At worst 9-10/10  At best 1-2/10 Current Status: Average 5-6/10  At worst 10+/10  At best 1-2/10 Progressing    3. Improve FOTO score from 47 points to > or = 52 points indicating improved tolerance with ADLs in regards to l/s. Status at last Eval: 47 Current Status: 47 Progressing    4. Patient will be able to demonstrate the appropriate sitting posture with or without use of l/s roll to facilitate sitting activities at home/work. Status at last Eval: Poor posture Current Status: Fair unsupported sitting posture observed with PT Progressing     Key Functional Changes/Progress: Mrs. Soledad Perkins was last seen for PT on 19 & presents today for re-assessment. On last PT session she reported pain level at 10/10 & since this visit she has had consultation with ortho surgeon today & will have MRI tomorrow.   She reports her pain has improved since initiation of prednisone on 19, no apparent change in status since last PT & minimal to no progress with PT. Problem List: pain affecting function, decrease ROM, decrease strength, impaired gait/ balance, decrease ADL/ functional abilitiies, decrease activity tolerance, decrease flexibility/ joint mobility and decrease transfer abilities   Treatment Plan may include any combination of the following: Therapeutic exercise, Therapeutic activities, Neuromuscular re-education, Physical agent/modality, Gait/balance training, Manual therapy, Patient education, Self Care training, Functional mobility training, Home safety training and Stair training  Patient Goal(s) has been updated and includes:  \"to have no pain, tightness, burning in my back, gluts\"    Goals for this certification period include and are to be achieved in   4  weeks:  1) Patient will report decreased c/o pain to < or = 3-4/10 to facilitate rising from chairs with manageable sx in l/s. 2) Improve FOTO score from 47 points to > or = 52 points indicating improved tolerance with ADLs in regards to l/s. 3) Patient will demonstrate (-) neural tension with Slump test on R LE to facilitate driving activities with manageable sx. 4) Centralize R LE sx to level of l/s to facilitate rising from chairs without peripheralization of sx. Frequency / Duration:   Patient to be seen   2   times per week for   4    weeks:    Assessments/Recommendations: Awaiting MD indication regarding continued PT given lack of progress with PT, f/u with ortho on 1-17-20. If you have any questions/comments please contact us directly at [phone]. Thank you for allowing us to assist in the care of your patient. Therapist Signature: TRICIA Domínguez, cert MDT Date: 6/16/3445   Certification Period:  Reporting Period: 1-14-20 to 4-12-20 12-09-19 to 1-14-20 Time: 5:31 PM   NOTE TO PHYSICIAN:  PLEASE COMPLETE THE ORDERS BELOW AND FAX TO   [facility]: [phone].   If you are unable to process this request in 24 hours please contact our office: [phone].    ___ I have read the above report and request that my patient continue as recommended.   ___ I have read the above report and request that my patient continue therapy with the following changes/special instructions: ________________________________________________   ___ I have read the above report and request that my patient be discharged from therapy.      Physician Signature:        Date:       Time:

## 2020-01-14 NOTE — PROGRESS NOTES
PHYSICAL THERAPY - DAILY TREATMENT NOTE    Patient Name: Tavon Arnold        Date: 2020  : 1950   YES Patient  Verified  Visit #:     Insurance: Payor: Gloria Hung / Plan: [place] MED[place]ARE PART A & B / Product Type: Medicare /      In time: 4:35 P Out time: 5:36 P   Total Treatment Time: 50     BCBS/Medicare Time Tracking (below)   Total Timed Codes (min):  50 1:1 Treatment Time:  50     TREATMENT AREA = Low back pain [M54.5]    SUBJECTIVE  Pain Level (on 0 to 10 scale):  5  / 10   Medication Changes/New allergies or changes in medical history, any new surgeries or procedures?     YES   If yes, update Summary List   Subjective Functional Status/Changes:  []  No changes reported     See progress note to MD    See updated medication log (patient started prednisone on 19)        Modalities Rationale:     decrease pain to improve patient's ability to return to pain-free ADLs  15 min [x] Estim, type/location: IFC to l/s in supine                                     []  att     [x]  unatt     []  w/US     []  w/ice    [x]  w/heat    min []  Mechanical Traction: type/lbs                   []  pro   []  sup   []  int   []  cont    []  before manual    []  after manual    min []  Ultrasound, settings/location:      min []  Iontophoresis w/ dexamethasone, location:                                               []  take home patch       []  in clinic    min []  Ice     []  Heat    location/position:     min []  Vasopneumatic Device, press/temp:     min []  Other:    [] Skin assessment post-treatment (if applicable):    [x]  intact    [x]  redness- no adverse reaction     []redness  adverse reaction:        35 min Therapeutic Exercise:  [x]  See flow sheet   Rationale:      increase ROM and increase strength to improve the patients ability to return to pain-free standing with upright posture      Billed With/As:   [] TE   [] TA   [] Neuro   [] Self Care Patient Education: [x] Review HEP    [] Progressed/Changed HEP based on:   [] positioning   [] body mechanics   [] transfers   [] heat/ice application    [] other:      Other Objective/Functional Measures: Added standing Tband rows with orange theraband  FOTO 47 points     Post Treatment Pain Level (on 0 to 10) scale:   3  / 10     ASSESSMENT  Assessment/Changes in Function:     No change in symptoms since last PT, minimal to no progress with current treatment      []  See Progress Note/Recertification   Patient will continue to benefit from skilled PT services to modify and progress therapeutic interventions, address functional mobility deficits, address ROM deficits, address strength deficits, analyze and cue movement patterns, analyze and modify body mechanics/ergonomics, assess and modify postural abnormalities and instruct in home and community integration to attain remaining goals. Progress toward goals / Updated goals:    Progressing towards STG 2 & LTGs     PLAN  []  Upgrade activities as tolerated  Continue plan of care   []  Discharge due to :    [x]  Other: See progress note to MD, to be placed on hold, awaiting MD indication      Therapist: Doris Davis, PT    Date: 1/14/2020 Time: 5:27 PM     No future appointments.

## 2020-01-17 ENCOUNTER — APPOINTMENT (OUTPATIENT)
Dept: PHYSICAL THERAPY | Age: 70
End: 2020-01-17
Payer: MEDICARE

## 2020-02-04 NOTE — PROGRESS NOTES
[address]rek [address] Holl[address]  [facility]  [facility]  Shaji Cantu [facility] [address], [address] ,[address], [address]  Phone: [phone]  Fax: [phone] FOR PHYSICAL THERAPY          Patient Name: Triny Sawyer : 1950   Treatment/Medical Diagnosis: Low back pain [M54.5]   Onset Date: 10-09-19    Referral Source: Ysabel Chavarria MD [facility]): 19   Prior Hospitalization: See Medical History Provider #: [id number]   Prior Level of Function: No c/o pain with ADLs   Comorbidities: H/o osteopenia, hypothyroidism, HM, neuropathy, c/s fusion 89., L4/5, L5-S1 fusion    Medications: Verified on Patient Summary List   Visits from [facility]: 5 Missed Visits: 5       Key Functional Changes/Progress: Mrs. Geena Hinson was last seen for PT on 20, please see progress note on this date. She was awaiting MRI as well as consultation with orthopedic surgeon & was placed on hold. No further contact has been made with clinic to continue with PT, therefore patient to be discharged to home program.      Assessments/Recommendations: Other: Self DC    If you have any questions/comments please contact us directly at [phone]. Thank you for allowing us to assist in the care of your patient.     Therapist Signature: Odella Apley, MPT, cert MDT Date:    Reporting Period: 20 to 20 Time: 7:25 AM

## 2020-04-02 DIAGNOSIS — E11.9 TYPE 2 DIABETES MELLITUS WITHOUT COMPLICATION, WITHOUT LONG-TERM CURRENT USE OF INSULIN (HCC): ICD-10-CM

## 2020-04-02 DIAGNOSIS — I10 ESSENTIAL HYPERTENSION: ICD-10-CM

## 2020-04-02 DIAGNOSIS — E78.01 FAMILIAL HYPERCHOLESTEROLEMIA: ICD-10-CM

## 2020-04-02 RX ORDER — GLIPIZIDE 5 MG/1
TABLET ORAL
Qty: 180 TABLET | Refills: 0 | Status: SHIPPED | OUTPATIENT
Start: 2020-04-02 | End: 2020-04-06 | Stop reason: SDUPTHER

## 2020-04-02 RX ORDER — DILTIAZEM HYDROCHLORIDE 240 MG/1
240 CAPSULE, COATED, EXTENDED RELEASE ORAL DAILY
Qty: 90 CAPSULE | Refills: 0 | Status: SHIPPED | OUTPATIENT
Start: 2020-04-02 | End: 2020-06-15 | Stop reason: SDUPTHER

## 2020-04-02 RX ORDER — SIMVASTATIN 20 MG
20 TABLET ORAL NIGHTLY
Qty: 90 TABLET | Refills: 0 | Status: SHIPPED | OUTPATIENT
Start: 2020-04-02 | End: 2020-06-15 | Stop reason: SDUPTHER

## 2020-04-02 RX ORDER — LISINOPRIL 20 MG/1
20 TABLET ORAL DAILY
Qty: 90 TABLET | Refills: 0 | Status: SHIPPED | OUTPATIENT
Start: 2020-04-02 | End: 2020-04-06 | Stop reason: SDUPTHER

## 2020-04-02 NOTE — TELEPHONE ENCOUNTER
PATIENT , EVE (ON KELSEA) CALL TO CHECK ON PATIENTS APPOINTMENT FOR 04/17/2020 AND TO GET TO REFILLS FOR HIS WIFE. WARM TRANSFERRED FOR APPOINTMENT. WOULD LIKE THE FOLLOWING MEDIATIONS FILLED AND SENT TO [facility] ON [address].     diltiaZEM CD (CARTIA XT) 240 MG 24 hr capsule  lisinopril (PRINIVIL,ZESTRIL) 20 MG tablet  simvastatin (ZOCOR) 20 MG tablet  glipiZIDE (GLUCOTROL) 5 MG tablet    PLEASE CALL FOR ANY QUESTIONS OR CONCERNS AT   MR. GARCIAJOZQW-[phone]  OR   EDCGXUP-[phone]

## 2020-04-06 DIAGNOSIS — E11.9 TYPE 2 DIABETES MELLITUS WITHOUT COMPLICATION, WITHOUT LONG-TERM CURRENT USE OF INSULIN (HCC): ICD-10-CM

## 2020-04-06 DIAGNOSIS — I10 ESSENTIAL HYPERTENSION: ICD-10-CM

## 2020-04-06 RX ORDER — GLIPIZIDE 5 MG/1
TABLET ORAL
Qty: 180 TABLET | Refills: 0 | Status: SHIPPED | OUTPATIENT
Start: 2020-04-06 | End: 2020-06-15 | Stop reason: SDUPTHER

## 2020-04-06 RX ORDER — LISINOPRIL 20 MG/1
20 TABLET ORAL DAILY
Qty: 90 TABLET | Refills: 0 | Status: SHIPPED | OUTPATIENT
Start: 2020-04-06 | End: 2020-06-15 | Stop reason: SDUPTHER

## 2020-05-15 ENCOUNTER — TRANSCRIBE ORDERS (OUTPATIENT)
Dept: ADMINISTRATIVE | Facility: HOSPITAL | Age: 70
End: 2020-05-15

## 2020-05-15 DIAGNOSIS — Z12.31 VISIT FOR SCREENING MAMMOGRAM: Primary | ICD-10-CM

## 2020-06-10 ENCOUNTER — TELEPHONE (OUTPATIENT)
Dept: FAMILY MEDICINE CLINIC | Facility: CLINIC | Age: 70
End: 2020-06-10

## 2020-06-10 NOTE — TELEPHONE ENCOUNTER
PT'S  CALLED STATING THAT THE PT IS GETTING CATARACT SURGERY DONE AND THE DR IS REQUIRING HER TO GET AN EKG SO THAT THEY CAN HAVE THE RESULT BACK BEFORE THE SURGERY.  PT WILL BRING A FORM WHEN SHE COMES ON 6/15.     EVE' CONTACT [phone]     PLEASE ADVISE

## 2020-06-10 NOTE — TELEPHONE ENCOUNTER
PTS  CALLED STATING PT WILL NEED AN EKG ON 6/15 AT APPT    PLEASE ADVISE AT: [phone]

## 2020-06-15 ENCOUNTER — OFFICE VISIT (OUTPATIENT)
Dept: FAMILY MEDICINE CLINIC | Facility: CLINIC | Age: 70
End: 2020-06-15

## 2020-06-15 VITALS
RESPIRATION RATE: 18 BRPM | OXYGEN SATURATION: 99 % | DIASTOLIC BLOOD PRESSURE: 80 MMHG | BODY MASS INDEX: 46.18 KG/M2 | TEMPERATURE: 97.9 F | HEART RATE: 73 BPM | HEIGHT: 58 IN | WEIGHT: 220 LBS | SYSTOLIC BLOOD PRESSURE: 110 MMHG

## 2020-06-15 DIAGNOSIS — E78.01 FAMILIAL HYPERCHOLESTEROLEMIA: ICD-10-CM

## 2020-06-15 DIAGNOSIS — Z91.89 BLEEDING RISK DUE TO ASPIRIN: ICD-10-CM

## 2020-06-15 DIAGNOSIS — Z13.6 ENCOUNTER FOR SCREENING FOR CARDIOVASCULAR DISORDERS: ICD-10-CM

## 2020-06-15 DIAGNOSIS — E11.9 DIABETES MELLITUS WITHOUT COMPLICATION (HCC): ICD-10-CM

## 2020-06-15 DIAGNOSIS — Z00.00 MEDICARE ANNUAL WELLNESS VISIT, SUBSEQUENT: Primary | ICD-10-CM

## 2020-06-15 DIAGNOSIS — I10 ESSENTIAL HYPERTENSION: ICD-10-CM

## 2020-06-15 DIAGNOSIS — H25.011 CORTICAL AGE-RELATED CATARACT OF RIGHT EYE: ICD-10-CM

## 2020-06-15 PROBLEM — H40.1190 PRIMARY OPEN ANGLE GLAUCOMA: Status: ACTIVE | Noted: 2020-02-28

## 2020-06-15 LAB
ALBUMIN SERPL-MCNC: 3.5 G/DL (ref 3.5–5.2)
ALBUMIN/GLOB SERPL: 1.2 G/DL
ALP SERPL-CCNC: 102 U/L (ref 39–117)
ALT SERPL W P-5'-P-CCNC: 19 U/L (ref 1–33)
ANION GAP SERPL CALCULATED.3IONS-SCNC: 10.7 MMOL/L (ref 5–15)
AST SERPL-CCNC: 23 U/L (ref 1–32)
BASOPHILS # BLD AUTO: 0.05 10*3/MM3 (ref 0–0.2)
BASOPHILS NFR BLD AUTO: 0.7 % (ref 0–1.5)
BILIRUB SERPL-MCNC: 0.5 MG/DL (ref 0.2–1.2)
BUN BLD-MCNC: 19 MG/DL (ref 8–23)
BUN/CREAT SERPL: 23.2 (ref 7–25)
CALCIUM SPEC-SCNC: 9.3 MG/DL (ref 8.6–10.5)
CHLORIDE SERPL-SCNC: 105 MMOL/L (ref 98–107)
CHOLEST SERPL-MCNC: 131 MG/DL (ref 0–200)
CO2 SERPL-SCNC: 21.3 MMOL/L (ref 22–29)
CREAT BLD-MCNC: 0.82 MG/DL (ref 0.57–1)
DEPRECATED RDW RBC AUTO: 45.1 FL (ref 37–54)
EOSINOPHIL # BLD AUTO: 0.25 10*3/MM3 (ref 0–0.4)
EOSINOPHIL NFR BLD AUTO: 3.4 % (ref 0.3–6.2)
ERYTHROCYTE [DISTWIDTH] IN BLOOD BY AUTOMATED COUNT: 13.1 % (ref 12.3–15.4)
GFR SERPL CREATININE-BSD FRML MDRD: 69 ML/MIN/1.73
GLOBULIN UR ELPH-MCNC: 2.9 GM/DL
GLUCOSE BLD-MCNC: 103 MG/DL (ref 65–99)
HBA1C MFR BLD: 6.7 % (ref 4.8–5.6)
HCT VFR BLD AUTO: 44 % (ref 34–46.6)
HDLC SERPL-MCNC: 47 MG/DL (ref 40–60)
HGB BLD-MCNC: 14.5 G/DL (ref 12–15.9)
IMM GRANULOCYTES # BLD AUTO: 0.02 10*3/MM3 (ref 0–0.05)
IMM GRANULOCYTES NFR BLD AUTO: 0.3 % (ref 0–0.5)
LDLC SERPL CALC-MCNC: 65 MG/DL (ref 0–100)
LDLC/HDLC SERPL: 1.38 {RATIO}
LYMPHOCYTES # BLD AUTO: 2.03 10*3/MM3 (ref 0.7–3.1)
LYMPHOCYTES NFR BLD AUTO: 27.7 % (ref 19.6–45.3)
MCH RBC QN AUTO: 30.3 PG (ref 26.6–33)
MCHC RBC AUTO-ENTMCNC: 33 G/DL (ref 31.5–35.7)
MCV RBC AUTO: 92.1 FL (ref 79–97)
MONOCYTES # BLD AUTO: 0.59 10*3/MM3 (ref 0.1–0.9)
MONOCYTES NFR BLD AUTO: 8.1 % (ref 5–12)
NEUTROPHILS # BLD AUTO: 4.38 10*3/MM3 (ref 1.7–7)
NEUTROPHILS NFR BLD AUTO: 59.8 % (ref 42.7–76)
NRBC BLD AUTO-RTO: 0.1 /100 WBC (ref 0–0.2)
PLATELET # BLD AUTO: 213 10*3/MM3 (ref 140–450)
PMV BLD AUTO: 12.2 FL (ref 6–12)
POTASSIUM BLD-SCNC: 4.9 MMOL/L (ref 3.5–5.2)
PROT SERPL-MCNC: 6.4 G/DL (ref 6–8.5)
RBC # BLD AUTO: 4.78 10*6/MM3 (ref 3.77–5.28)
SODIUM BLD-SCNC: 137 MMOL/L (ref 136–145)
TRIGL SERPL-MCNC: 96 MG/DL (ref 0–150)
TSH SERPL DL<=0.05 MIU/L-ACNC: 1.42 UIU/ML (ref 0.27–4.2)
VLDLC SERPL-MCNC: 19.2 MG/DL (ref 5–40)
WBC NRBC COR # BLD: 7.32 10*3/MM3 (ref 3.4–10.8)

## 2020-06-15 PROCEDURE — 36415 COLL VENOUS BLD VENIPUNCTURE: CPT | Performed by: FAMILY MEDICINE

## 2020-06-15 PROCEDURE — 93000 ELECTROCARDIOGRAM COMPLETE: CPT | Performed by: FAMILY MEDICINE

## 2020-06-15 PROCEDURE — 84443 ASSAY THYROID STIM HORMONE: CPT | Performed by: FAMILY MEDICINE

## 2020-06-15 PROCEDURE — 99213 OFFICE O/P EST LOW 20 MIN: CPT | Performed by: FAMILY MEDICINE

## 2020-06-15 PROCEDURE — 85025 COMPLETE CBC W/AUTO DIFF WBC: CPT | Performed by: FAMILY MEDICINE

## 2020-06-15 PROCEDURE — 80061 LIPID PANEL: CPT | Performed by: FAMILY MEDICINE

## 2020-06-15 PROCEDURE — G0439 PPPS, SUBSEQ VISIT: HCPCS | Performed by: FAMILY MEDICINE

## 2020-06-15 PROCEDURE — 83036 HEMOGLOBIN GLYCOSYLATED A1C: CPT | Performed by: FAMILY MEDICINE

## 2020-06-15 PROCEDURE — 80053 COMPREHEN METABOLIC PANEL: CPT | Performed by: FAMILY MEDICINE

## 2020-06-15 RX ORDER — DILTIAZEM HYDROCHLORIDE 240 MG/1
240 CAPSULE, COATED, EXTENDED RELEASE ORAL DAILY
Qty: 90 CAPSULE | Refills: 3 | Status: SHIPPED | OUTPATIENT
Start: 2020-06-15 | End: 2021-06-16 | Stop reason: SDUPTHER

## 2020-06-15 RX ORDER — FOLIC ACID 1 MG/1
TABLET ORAL
COMMUNITY

## 2020-06-15 RX ORDER — GLIPIZIDE 5 MG/1
TABLET ORAL
Qty: 180 TABLET | Refills: 3 | Status: SHIPPED | OUTPATIENT
Start: 2020-06-15 | End: 2021-06-16 | Stop reason: SDUPTHER

## 2020-06-15 RX ORDER — LISINOPRIL 20 MG/1
20 TABLET ORAL DAILY
Qty: 90 TABLET | Refills: 3 | Status: SHIPPED | OUTPATIENT
Start: 2020-06-15 | End: 2021-06-16 | Stop reason: SDUPTHER

## 2020-06-15 RX ORDER — SIMVASTATIN 20 MG
20 TABLET ORAL NIGHTLY
Qty: 90 TABLET | Refills: 3 | Status: SHIPPED | OUTPATIENT
Start: 2020-06-15 | End: 2021-06-16 | Stop reason: SDUPTHER

## 2020-06-15 RX ORDER — HYDROXYZINE HYDROCHLORIDE 25 MG/1
TABLET, FILM COATED ORAL
COMMUNITY
End: 2022-06-22

## 2020-06-15 RX ORDER — TIMOLOL MALEATE 5 MG/ML
SOLUTION/ DROPS OPHTHALMIC
COMMUNITY
Start: 2020-03-22

## 2020-06-15 NOTE — PROGRESS NOTES
Subsequent Medicare Wellness Visit   The ABC's of the Annual Wellness Visit    Chief Complaint   Patient presents with   • Pre-op Exam       HPI:  Keisha Padilla, -1950, is a 70 y.o. female who presents for a Subsequent Medicare Wellness Visit.  Pt also needs pre-op for upcoming right cataract surgery (see form)  Recent Hospitalizations:  No hospitalization(s) within the last year..    Current Medical Providers:  Patient Care Team:  Ximena Felix DO as PCP - General (Family Medicine)  Bon Neal MD (Inactive) as PCP - Claims Attributed  Ori Sandhu MD as Consulting Physician (Ophthalmology)  Titi Lombardo MD as Obstetrician (Obstetrics and Gynecology)    Health Habits and Functional and Cognitive Screening and Depression Screening:  Functional & Cognitive Status 6/15/2020   Do you have difficulty preparing food and eating? No   Do you have difficulty bathing yourself, getting dressed or grooming yourself? No   Do you have difficulty using the toilet? No   Do you have difficulty moving around from place to place? No   Do you have trouble with steps or getting out of a bed or a chair? No   Current Diet Well Balanced Diet   Dental Exam Up to date   Eye Exam Up to date   Exercise (times per week) 0 times per week   Current Exercise Activities Include None   Do you need help using the phone?  No   Are you deaf or do you have serious difficulty hearing?  No   Do you need help with transportation? No   Do you need help shopping? No   Do you need help preparing meals?  No   Do you need help with housework?  No   Do you need help with laundry? No   Do you need help taking your medications? No   Do you need help managing money? No   Do you ever drive or ride in a car without wearing a seat belt? No   Have you felt unusual stress, anger or loneliness in the last month? No   Who do you live with? Spouse   If you need help, do you have trouble finding someone available to you? No   Have you been  bothered in the last four weeks by sexual problems? No   Do you have difficulty concentrating, remembering or making decisions? No       Compared to one year ago, the patient feels her physical health is the same and her mental health is the same.    Depression Screen:  PHQ-2/PHQ-9 Depression Screening 6/15/2020   Little interest or pleasure in doing things 0   Feeling down, depressed, or hopeless 0   Trouble falling or staying asleep, or sleeping too much -   Feeling tired or having little energy -   Poor appetite or overeating -   Feeling bad about yourself - or that you are a failure or have let yourself or your family down -   Trouble concentrating on things, such as reading the newspaper or watching television -   Moving or speaking so slowly that other people could have noticed. Or the opposite - being so fidgety or restless that you have been moving around a lot more than usual -   Thoughts that you would be better off dead, or of hurting yourself in some way -   Total Score 0   If you checked off any problems, how difficult have these problems made it for you to do your work, take care of things at home, or get along with other people? -         Past Medical/Family/Social History:  The following portions of the patient's history were reviewed and updated as appropriate: allergies, current medications, past family history, past medical history, past social history, past surgical history and problem list.    Allergies   Allergen Reactions   • Augmentin [Amoxicillin-Pot Clavulanate] Nausea And Vomiting   • Sulfa Antibiotics      Mother and daughter are allergic to it, she just avoids it         Current Outpatient Medications:   •  aspirin 81 MG tablet, Take 1 tablet by mouth Daily., Disp: 90 tablet, Rfl: 3  •  budesonide-formoterol (SYMBICORT) 160-4.5 MCG/ACT inhaler, Inhale 2 puffs 2 (two) times a day., Disp: 1 inhaler, Rfl: 0  •  Cholecalciferol (VITAMIN D3) 2000 UNITS capsule, Take 1 capsule by mouth daily.,  Disp: , Rfl:   •  diclofenac (VOLTAREN) 75 MG EC tablet, Take 1 tablet by mouth Daily As Needed (OA Pain)., Disp: 90 tablet, Rfl: 3  •  dilTIAZem CD (Cartia XT) 240 MG 24 hr capsule, Take 1 capsule by mouth Daily., Disp: 90 capsule, Rfl: 3  •  folic acid (FOLVITE) 1 MG tablet, folic acid 1 mg tablet  Daily, Disp: , Rfl:   •  glipizide (GLUCOTROL) 5 MG tablet, Take one po bid, Disp: 180 tablet, Rfl: 3  •  hydrOXYzine (ATARAX) 25 MG tablet, hydroxyzine HCl 25 mg tablet  Daily, Disp: , Rfl:   •  lisinopril (PRINIVIL,ZESTRIL) 20 MG tablet, Take 1 tablet by mouth Daily., Disp: 90 tablet, Rfl: 3  •  simvastatin (ZOCOR) 20 MG tablet, Take 1 tablet by mouth Every Night., Disp: 90 tablet, Rfl: 3  •  timolol (TIMOPTIC) 0.5 % ophthalmic solution, INSTILL 1 DROP EVERY 12 HOURS, Disp: , Rfl:     Aspirin use counseling: Taking ASA appropriately as indicated    Current medication list contains no high risk medications.  No harmful drug interactions have been identified.     Family History   Problem Relation Age of Onset   • Pancreatitis Mother    • Alzheimer's disease Father    • Breast cancer Neg Hx    • Ovarian cancer Neg Hx        Social History     Tobacco Use   • Smoking status: Never Smoker   • Smokeless tobacco: Never Used   Substance Use Topics   • Alcohol use: No       Past Surgical History:   Procedure Laterality Date   •  SECTION     • COLONOSCOPY     • HEMORRHOIDECTOMY     • HYSTERECTOMY     • OOPHORECTOMY     • TONSILLECTOMY AND ADENOIDECTOMY         Patient Active Problem List   Diagnosis   • Endometrial cancer (CMS/HCC)   • Type 2 diabetes mellitus without complication, without long-term current use of insulin (CMS/HCC)   • Primary open angle glaucoma       Review of Systems   Constitutional: Negative.  Negative for activity change, fatigue, fever and unexpected weight change.   HENT: Negative.  Negative for congestion, sneezing and sore throat.    Eyes: Negative.  Negative for visual  "disturbance.   Respiratory: Negative.  Negative for cough, chest tightness, shortness of breath and wheezing.    Cardiovascular: Negative.  Negative for chest pain, palpitations and leg swelling.   Gastrointestinal: Negative.  Negative for abdominal distention, abdominal pain, blood in stool, constipation, diarrhea and nausea.   Endocrine: Negative.  Negative for cold intolerance and heat intolerance.   Genitourinary: Negative.  Negative for dysuria, frequency and urgency.   Musculoskeletal: Negative.  Negative for arthralgias and myalgias.   Skin: Negative.  Negative for rash.   Allergic/Immunologic: Negative.    Neurological: Negative.  Negative for dizziness, syncope and numbness.   Hematological: Negative.  Negative for adenopathy.   Psychiatric/Behavioral: Negative.  Negative for suicidal ideas. The patient is not nervous/anxious.        Objective     Vitals:    06/15/20 0823   BP: 110/80   BP Location: Right arm   Patient Position: Sitting   Cuff Size: Adult   Pulse: 73   Resp: 18   Temp: 97.9 °F (36.6 °C)   TempSrc: Temporal   SpO2: 99%   Weight: 99.8 kg (220 lb)   Height: 147.3 cm (58\")   PainSc: 0-No pain       Patient's Body mass index is 45.98 kg/m². BMI is above normal parameters. Recommendations include: nutrition counseling.      Hearing Screening Comments: Normal hearing  Vision Screening Comments: Per ophth    The patient has no evidence of cognitve impairment.     Physical Exam   Constitutional: She is oriented to person, place, and time. She appears well-developed and well-nourished. No distress.   HENT:   Right Ear: External ear normal.   Left Ear: External ear normal.   Nose: Nose normal.   Mouth/Throat: Oropharynx is clear and moist.   Eyes: Pupils are equal, round, and reactive to light. Conjunctivae and EOM are normal.   Neck: Normal range of motion. Neck supple. No thyromegaly present.   Cardiovascular: Normal rate, regular rhythm and normal heart sounds.   No murmur heard.  Pulmonary/Chest: " Effort normal and breath sounds normal. No respiratory distress. She has no wheezes.   Abdominal: Soft. Bowel sounds are normal. She exhibits no distension and no mass. There is no tenderness. There is no rebound and no guarding. No hernia.   Musculoskeletal: Normal range of motion. She exhibits no edema or tenderness.   Lymphadenopathy:     She has no cervical adenopathy.   Neurological: She is alert and oriented to person, place, and time. She has normal reflexes.   Skin: Skin is warm and dry. No rash noted. She is not diaphoretic. No erythema. No pallor.   Psychiatric: She has a normal mood and affect. Her behavior is normal. Judgment and thought content normal.   Nursing note and vitals reviewed.      Recent Lab Results:     Lab Results   Component Value Date    CHOL 149 04/11/2019    TRIG 111 04/11/2019    HDL 58 04/11/2019    VLDL 22.2 04/11/2019    LDLHDL 1.19 04/11/2019       Assessment/Plan   Age-appropriate Screening Schedule:  Refer to the list below for future screening recommendations based on patient's age, sex and/or medical conditions.      Health Maintenance   Topic Date Due   • DIABETIC FOOT EXAM  04/11/2020   • LIPID PANEL  04/11/2020   • HEMOGLOBIN A1C  04/11/2020   • URINE MICROALBUMIN  04/11/2020   • INFLUENZA VACCINE  08/01/2020   • DIABETIC EYE EXAM  02/28/2021   • DXA SCAN  04/04/2021   • MAMMOGRAM  06/24/2021   • TDAP/TD VACCINES (2 - Td) 10/03/2026   • COLONOSCOPY  02/22/2028   • ZOSTER VACCINE  Discontinued       Medicare Risks and Personalized Health Plan:  Cardiovascular risk  Obesity/Overweight   Polypharmacy      CMS-Preventive Services Quick Reference  Medicare Preventive Services Addressed:  Annual Wellness Visit (AWV)  Cardiovascular Disease Screening Tests (may do this order every 5 years in beneficiaries without signs or symptoms of cardiovascular disease)  Diabetes Screening-Lab Order for either glucose quantitative blood (except reagent strip), glucose;post glucose  dose(includes glucose), or glucose tolerance test-3 specimens(includes glucose)    Advance Care Planning:  ACP discussion was held with the patient during this visit. Patient does not have an advance directive, information provided.    Diagnoses and all orders for this visit:    1. Medicare annual wellness visit, subsequent (Primary)    2. Diabetes mellitus without complication (CMS/[facility])  -     CBC & Differential  -     Comprehensive Metabolic Panel  -     TSH  -     Hemoglobin A1c  -     glipizide (GLUCOTROL) 5 MG tablet; Take one po bid  Dispense: 180 tablet; Refill: 3  -     lisinopril (PRINIVIL,ZESTRIL) 20 MG tablet; Take 1 tablet by mouth Daily.  Dispense: 90 tablet; Refill: 3  -     CBC Auto Differential    3. Cortical age-related cataract of right eye    4. Encounter for screening for cardiovascular disorders    5. Essential hypertension  -     ECG 12 Lead  -     dilTIAZem CD (Cartia XT) 240 MG 24 hr capsule; Take 1 capsule by mouth Daily.  Dispense: 90 capsule; Refill: 3  -     lisinopril (PRINIVIL,ZESTRIL) 20 MG tablet; Take 1 tablet by mouth Daily.  Dispense: 90 tablet; Refill: 3    6. Familial hypercholesterolemia  -     Lipid Panel  -     simvastatin (ZOCOR) 20 MG tablet; Take 1 tablet by mouth Every Night.  Dispense: 90 tablet; Refill: 3    7. Bleeding risk due to aspirin    See pre-op form  Patient is here for health maintenance visit.  Discussed diet and adequate exercise.  Screening lab work discussed.  Immunizations reviewed.  Advice and education regarding nutrition, exercise, dental evaluations, routine eye examinations, reproductive health, cardiovascular risk reduction, sunscreen use, self skin examination, and seatbelt use was given today.  Annual wellness evaluations recommended.      An After Visit Summary and PPPS with all of these plans were given to the patient.      Follow Up:  Return in about 6 months (around 12/15/2020).

## 2020-06-15 NOTE — PROGRESS NOTES
Subjective   Keisha Padilla is a 70 y.o. female who presents to the office today for a preoperative consultation at the request of surgeon Dr Sandhu who plans on performing cataract on right/cornea replacement on June 24. This consultation is requested for the specific conditions prompting preoperative evaluation (i.e. because of potential affect on operative risk): none. Planned anesthesia: local. The patient has the following known anesthesia issues: none. Patients bleeding risk: no recent abnormal bleeding and no remote history of abnormal bleeding. Patient does not have objections to receiving blood products if needed.    The following portions of the patient's history were reviewed and updated as appropriate: allergies, current medications, past family history, past medical history, past social history, past surgical history and problem list.    Review of Systems  Review of Systems   Constitutional: Negative.  Negative for activity change, fatigue, fever and unexpected weight change.   HENT: Negative.  Negative for congestion, sneezing and sore throat.    Eyes: Negative.  Negative for visual disturbance.   Respiratory: Negative.  Negative for cough, chest tightness, shortness of breath and wheezing.    Cardiovascular: Negative.  Negative for chest pain, palpitations and leg swelling.   Gastrointestinal: Negative.  Negative for abdominal distention, abdominal pain, blood in stool, constipation, diarrhea and nausea.   Endocrine: Negative.  Negative for cold intolerance and heat intolerance.   Genitourinary: Negative.  Negative for dysuria, frequency and urgency.   Musculoskeletal: Negative.  Negative for arthralgias and myalgias.   Skin: Negative.  Negative for rash.   Allergic/Immunologic: Negative.    Neurological: Negative.  Negative for dizziness, syncope and numbness.   Hematological: Negative.  Negative for adenopathy.   Psychiatric/Behavioral: Negative.  Negative for suicidal ideas. The patient is not  nervous/anxious.          Objective   Physical Exam   Constitutional: She is oriented to person, place, and time. She appears well-developed and well-nourished. No distress.   HENT:   Right Ear: External ear normal.   Left Ear: External ear normal.   Nose: Nose normal.   Mouth/Throat: Oropharynx is clear and moist.   Eyes: Pupils are equal, round, and reactive to light. Conjunctivae and EOM are normal.   Neck: Normal range of motion. Neck supple. No thyromegaly present.   Cardiovascular: Normal rate, regular rhythm and normal heart sounds.   No murmur heard.  Pulmonary/Chest: Effort normal and breath sounds normal. No respiratory distress. She has no wheezes.   Abdominal: Soft. Bowel sounds are normal. She exhibits no distension and no mass. There is no tenderness. There is no rebound and no guarding. No hernia.   Musculoskeletal: Normal range of motion. She exhibits no edema or tenderness.   Lymphadenopathy:     She has no cervical adenopathy.   Neurological: She is alert and oriented to person, place, and time. She has normal reflexes.   Skin: Skin is warm and dry. No rash noted. She is not diaphoretic. No erythema. No pallor.   Psychiatric: She has a normal mood and affect. Her behavior is normal. Judgment and thought content normal.   Nursing note and vitals reviewed.      ECG 12 Lead  Date/Time: 6/15/2020 8:49 AM  Performed by: Ximena Felix DO  Authorized by: Ximena Felix DO   Comparison: not compared with previous ECG   Previous ECG: no previous ECG available  Rhythm: sinus bradycardia  Rate: bradycardic  BPM: 52  Conduction: conduction normal  ST Segments: ST segments normal  T Waves: T waves normal  Other: no other findings    Clinical impression: normal ECG              Predictors of intubation difficulty:   Morbid obesity? yes - .   Anatomically abnormal facies? no   Short, thick neck? no   Neck range of motion: normal      Cardiographics        Imaging  Chest x-ray: not done     Lab Review        Assessment/Plan   70 y.o. female with planned surgery as above.    Known risk factors for perioperative complications: Diabetes mellitus    Difficulty with intubation is not anticipated.      Current medications which may produce withdrawal symptoms if withheld perioperatively: none  Pt cleared for surgery.    1. Preoperative workup as follows ECG, hemoglobin, hematocrit, electrolytes, creatinine, glucose, liver function studies, coagulation studies.  2. Change in medication regimen before surgery: none, continue medication regimen including morning of surgery, with sip of water.  3. Prophylaxis for cardiac events with perioperative beta-blockers: not indicated.  4. Invasive hemodynamic monitoring perioperatively: at the discretion of anesthesiologist.  5. Deep vein thrombosis prophylaxis postoperatively:regimen to be chosen by surgical team.  6. Surveillance for postoperative MI with ECG immediately postoperatively and on postoperative days 1 and 2 AND troponin levels 24 hours postoperatively and on day 4 or hospital discharge (whichever comes first): at the discretion of anesthesiologist

## 2020-07-31 ENCOUNTER — OFFICE VISIT (OUTPATIENT)
Dept: OBSTETRICS AND GYNECOLOGY | Facility: CLINIC | Age: 70
End: 2020-07-31

## 2020-07-31 VITALS
RESPIRATION RATE: 14 BRPM | SYSTOLIC BLOOD PRESSURE: 132 MMHG | BODY MASS INDEX: 45.77 KG/M2 | DIASTOLIC BLOOD PRESSURE: 80 MMHG | WEIGHT: 219 LBS

## 2020-07-31 DIAGNOSIS — K63.5 POLYP OF COLON, UNSPECIFIED PART OF COLON, UNSPECIFIED TYPE: ICD-10-CM

## 2020-07-31 DIAGNOSIS — N95.1 SYMPTOMATIC MENOPAUSAL OR FEMALE CLIMACTERIC STATES: Primary | ICD-10-CM

## 2020-07-31 PROBLEM — Z01.419 WELL WOMAN EXAM: Status: ACTIVE | Noted: 2020-07-31

## 2020-07-31 PROCEDURE — 99203 OFFICE O/P NEW LOW 30 MIN: CPT | Performed by: OBSTETRICS & GYNECOLOGY

## 2020-07-31 NOTE — PROGRESS NOTES
Subjective   Chief Complaint   Patient presents with   • Gynecologic Exam       Keisha Padilla is a 70 y.o. year old .  No LMP recorded. Patient has had a hysterectomy.  She presents because of transitioning from GYN oncology.  She has a remote history of endometrial cancer that was treated surgically.  It was early stage early grade.  She has been without disease.  She is transitioning from oncology.  She has no complaints or concerns today.  She really does not want to be here.  She comes only today because her daughter made her.  As best she knows she has had 1 ovary removed.  The other ovary was attempted to be removed at the time of vaginal hysterectomy but it retracted from the field and she could not have it removed.  That surgery was done by Dr. East.    Appears to be up-to-date on most of her testing.  Her last mammogram was less than a year ago.  She has a follow-up one scheduled.  She does have a prior colon polyp that was found by Dr. Harris.  The pathology reports are not available.  Thus far she is never had a bone density done especially knows.    The following portions of the patient's history were reviewed and updated as appropriate:current medications, allergies, past family history, past medical history, past social history and past surgical history    Social History    Tobacco Use      Smoking status: Never Smoker      Smokeless tobacco: Never Used         Objective   /80   Resp 14   Wt 99.3 kg (219 lb)   Breastfeeding No   BMI 45.77 kg/m²     Lab Review   No data reviewed    Imaging   Mammogram report        Assessment   1. Prior history of endometrial cancer.  She is FACUNDO > 5 years  2. Prior history of colon polyp.  Records and pathology are not available and need to be obtained  3. She is up to date on all relevant gynecologic and colorectal screenings except DEXA's for osteoporosis     Plan   1. Pap was not done today.  I explained to Keisha that the Pap smears are no  longer recommended in patient's after 65 years of age.   I stressed to Keisha that she still should be seen to be seen yearly for a full physical including breast and pelvic exam.  2. She was encouraged to get yearly mammograms.  She should report any palpable breast lump(s) or skin changes regardless of mammographic findings.  I explained to Keisha that notification regarding her mammogram results will come from the center performing the study.  Our office will not be routinely calling with mammogram results.  It is her responsibility to make sure that the results from the mammogram are communicated to her by the breast center.  If she has any questions about the results, she is welcome to call our office anytime.  3. Bone density testing was recommended.  I reviewed with Keisha that it was always most advisable for all bone density tests for each patient to be done on the same machine over time.  The purpose of this is to improve the accuracy of the interpretation of serial studies.  4. The following data needs to be obtained to update her medical records: last colonoscopy and pathology reports.  5. At this point of told Keisha that of her primary care was willing to do clinical breast exams on her and manage her other screening elements I will need to see her again  6. The importance of keeping all planned follow-up and taking all medications as prescribed was emphasized.  7. Follow up PRN            This note was electronically signed.    Titi Lombardo M.D.  July 31, 2020    Total time spent today with Keisha  was 35 minutes (level 3).  Off this time, 90% was spent face-to-face time coordinating care, answering her questions and counseling regarding pathophysiology of her presenting problem along with plans for any diagnositc work-up and treatment.    Note: Speech recognition transcription software may have been used to create portions of this document.  An attempt at proofreading has been made but errors in  transcription could still be present.

## 2020-08-10 ENCOUNTER — HOSPITAL ENCOUNTER (OUTPATIENT)
Dept: MAMMOGRAPHY | Facility: HOSPITAL | Age: 70
Discharge: HOME | End: 2020-08-10
Admitting: FAMILY MEDICINE

## 2020-08-10 DIAGNOSIS — Z12.31 VISIT FOR SCREENING MAMMOGRAM: ICD-10-CM

## 2020-08-10 PROCEDURE — 77067 SCR MAMMO BI INCL CAD: CPT | Performed by: RADIOLOGY

## 2020-08-10 PROCEDURE — 77063 BREAST TOMOSYNTHESIS BI: CPT

## 2020-08-10 PROCEDURE — 77063 BREAST TOMOSYNTHESIS BI: CPT | Performed by: RADIOLOGY

## 2020-08-10 PROCEDURE — 77067 SCR MAMMO BI INCL CAD: CPT

## 2021-01-04 ENCOUNTER — APPOINTMENT (OUTPATIENT)
Dept: BONE DENSITY | Facility: HOSPITAL | Age: 71
End: 2021-01-04

## 2021-06-16 ENCOUNTER — OFFICE VISIT (OUTPATIENT)
Dept: FAMILY MEDICINE CLINIC | Facility: CLINIC | Age: 71
End: 2021-06-16

## 2021-06-16 VITALS
HEIGHT: 59 IN | SYSTOLIC BLOOD PRESSURE: 130 MMHG | HEART RATE: 64 BPM | BODY MASS INDEX: 43.3 KG/M2 | OXYGEN SATURATION: 99 % | DIASTOLIC BLOOD PRESSURE: 76 MMHG | WEIGHT: 214.8 LBS | RESPIRATION RATE: 18 BRPM | TEMPERATURE: 97.7 F

## 2021-06-16 DIAGNOSIS — E11.9 TYPE 2 DIABETES MELLITUS WITHOUT COMPLICATION, WITHOUT LONG-TERM CURRENT USE OF INSULIN (HCC): ICD-10-CM

## 2021-06-16 DIAGNOSIS — E78.2 MIXED HYPERLIPIDEMIA: ICD-10-CM

## 2021-06-16 DIAGNOSIS — I10 ESSENTIAL HYPERTENSION: ICD-10-CM

## 2021-06-16 DIAGNOSIS — Z00.00 ENCOUNTER FOR SUBSEQUENT ANNUAL WELLNESS VISIT IN MEDICARE PATIENT: Primary | ICD-10-CM

## 2021-06-16 LAB
ALBUMIN SERPL-MCNC: 3.5 G/DL (ref 3.5–5.2)
ALBUMIN/GLOB SERPL: 1.3 G/DL
ALP SERPL-CCNC: 81 U/L (ref 39–117)
ALT SERPL W P-5'-P-CCNC: 15 U/L (ref 1–33)
ANION GAP SERPL CALCULATED.3IONS-SCNC: 10.1 MMOL/L (ref 5–15)
AST SERPL-CCNC: 17 U/L (ref 1–32)
BASOPHILS # BLD AUTO: 0.04 10*3/MM3 (ref 0–0.2)
BASOPHILS NFR BLD AUTO: 0.5 % (ref 0–1.5)
BILIRUB SERPL-MCNC: 0.5 MG/DL (ref 0–1.2)
BUN SERPL-MCNC: 17 MG/DL (ref 8–23)
BUN/CREAT SERPL: 20.5 (ref 7–25)
CALCIUM SPEC-SCNC: 9 MG/DL (ref 8.6–10.5)
CHLORIDE SERPL-SCNC: 104 MMOL/L (ref 98–107)
CHOLEST SERPL-MCNC: 137 MG/DL (ref 0–200)
CO2 SERPL-SCNC: 22.9 MMOL/L (ref 22–29)
CREAT SERPL-MCNC: 0.83 MG/DL (ref 0.57–1)
DEPRECATED RDW RBC AUTO: 43.4 FL (ref 37–54)
EOSINOPHIL # BLD AUTO: 0.2 10*3/MM3 (ref 0–0.4)
EOSINOPHIL NFR BLD AUTO: 2.6 % (ref 0.3–6.2)
ERYTHROCYTE [DISTWIDTH] IN BLOOD BY AUTOMATED COUNT: 12.9 % (ref 12.3–15.4)
EXPIRATION DATE: NORMAL
GFR SERPL CREATININE-BSD FRML MDRD: 67 ML/MIN/1.73
GLOBULIN UR ELPH-MCNC: 2.6 GM/DL
GLUCOSE SERPL-MCNC: 104 MG/DL (ref 65–99)
HBA1C MFR BLD: 6.1 %
HCT VFR BLD AUTO: 43.6 % (ref 34–46.6)
HDLC SERPL-MCNC: 50 MG/DL (ref 40–60)
HGB BLD-MCNC: 14.7 G/DL (ref 12–15.9)
IMM GRANULOCYTES # BLD AUTO: 0.03 10*3/MM3 (ref 0–0.05)
IMM GRANULOCYTES NFR BLD AUTO: 0.4 % (ref 0–0.5)
LDLC SERPL CALC-MCNC: 68 MG/DL (ref 0–100)
LDLC/HDLC SERPL: 1.32 {RATIO}
LYMPHOCYTES # BLD AUTO: 2.12 10*3/MM3 (ref 0.7–3.1)
LYMPHOCYTES NFR BLD AUTO: 27.6 % (ref 19.6–45.3)
Lab: NORMAL
MCH RBC QN AUTO: 31.3 PG (ref 26.6–33)
MCHC RBC AUTO-ENTMCNC: 33.7 G/DL (ref 31.5–35.7)
MCV RBC AUTO: 92.8 FL (ref 79–97)
MONOCYTES # BLD AUTO: 0.64 10*3/MM3 (ref 0.1–0.9)
MONOCYTES NFR BLD AUTO: 8.3 % (ref 5–12)
NEUTROPHILS NFR BLD AUTO: 4.64 10*3/MM3 (ref 1.7–7)
NEUTROPHILS NFR BLD AUTO: 60.6 % (ref 42.7–76)
NRBC BLD AUTO-RTO: 0 /100 WBC (ref 0–0.2)
PLATELET # BLD AUTO: 212 10*3/MM3 (ref 140–450)
PMV BLD AUTO: 12.6 FL (ref 6–12)
POTASSIUM SERPL-SCNC: 4.9 MMOL/L (ref 3.5–5.2)
PROT SERPL-MCNC: 6.1 G/DL (ref 6–8.5)
RBC # BLD AUTO: 4.7 10*6/MM3 (ref 3.77–5.28)
SODIUM SERPL-SCNC: 137 MMOL/L (ref 136–145)
TRIGL SERPL-MCNC: 106 MG/DL (ref 0–150)
TSH SERPL DL<=0.05 MIU/L-ACNC: 1.72 UIU/ML (ref 0.27–4.2)
VLDLC SERPL-MCNC: 19 MG/DL (ref 5–40)
WBC # BLD AUTO: 7.67 10*3/MM3 (ref 3.4–10.8)

## 2021-06-16 PROCEDURE — G0439 PPPS, SUBSEQ VISIT: HCPCS | Performed by: FAMILY MEDICINE

## 2021-06-16 PROCEDURE — 83036 HEMOGLOBIN GLYCOSYLATED A1C: CPT | Performed by: FAMILY MEDICINE

## 2021-06-16 PROCEDURE — 80053 COMPREHEN METABOLIC PANEL: CPT | Performed by: FAMILY MEDICINE

## 2021-06-16 PROCEDURE — 85025 COMPLETE CBC W/AUTO DIFF WBC: CPT | Performed by: FAMILY MEDICINE

## 2021-06-16 PROCEDURE — 80061 LIPID PANEL: CPT | Performed by: FAMILY MEDICINE

## 2021-06-16 PROCEDURE — 84443 ASSAY THYROID STIM HORMONE: CPT | Performed by: FAMILY MEDICINE

## 2021-06-16 RX ORDER — LATANOPROST 50 UG/ML
SOLUTION/ DROPS OPHTHALMIC
COMMUNITY

## 2021-06-16 RX ORDER — GLIPIZIDE 5 MG/1
TABLET ORAL
Qty: 180 TABLET | Refills: 3 | Status: SHIPPED | OUTPATIENT
Start: 2021-06-16 | End: 2021-07-21

## 2021-06-16 RX ORDER — SIMVASTATIN 20 MG
20 TABLET ORAL NIGHTLY
Qty: 90 TABLET | Refills: 3 | Status: SHIPPED | OUTPATIENT
Start: 2021-06-16 | End: 2022-06-22 | Stop reason: SDUPTHER

## 2021-06-16 RX ORDER — DILTIAZEM HYDROCHLORIDE 240 MG/1
240 CAPSULE, COATED, EXTENDED RELEASE ORAL DAILY
Qty: 90 CAPSULE | Refills: 3 | Status: SHIPPED | OUTPATIENT
Start: 2021-06-16 | End: 2021-07-21

## 2021-06-16 RX ORDER — LISINOPRIL 20 MG/1
20 TABLET ORAL DAILY
Qty: 90 TABLET | Refills: 3 | Status: SHIPPED | OUTPATIENT
Start: 2021-06-16 | End: 2022-06-22 | Stop reason: SDUPTHER

## 2021-06-16 NOTE — PATIENT INSTRUCTIONS
Advance Care Planning and Advance Directives     You make decisions on a daily basis - decisions about where you want to live, your career, your home, your life. Perhaps one of the most important decisions you face is your choice for future medical care. Take time to talk with your family and your healthcare team and start planning today.  Advance Care Planning is a process that can help you:  · Understand possible future healthcare decisions in light of your own experiences  · Reflect on those decision in light of your goals and values  · Discuss your decisions with those closest to you and the healthcare professionals that care for you  · Make a plan by creating a document that reflects your wishes    Surrogate Decision Maker  In the event of a medical emergency, which has left you unable to communicate or to make your own decisions, you would need someone to make decisions for you.  It is important to discuss your preferences for medical treatment with this person while you are in good health.     Qualities of a surrogate decision maker:  • Willing to take on this role and responsibility  • Knows what you want for future medical care  • Willing to follow your wishes even if they don't agree with them  • Able to make difficult medical decisions under stressful circumstances    Advance Directives  These are legal documents you can create that will guide your healthcare team and decision maker(s) when needed. These documents can be stored in the electronic medical record.    · Living Will - a legal document to guide your care if you have a terminal condition or a serious illness and are unable to communicate. States vary by statute in document names/types, but most forms may include one or more of the following:        -  Directions regarding life-prolonging treatments        -  Directions regarding artificially provided nutrition/hydration        -  Choosing a healthcare decision maker        -  Direction  regarding organ/tissue donation    · Durable Power of  for Healthcare - this document names an -in-fact to make medical decisions for you, but it may also allow this person to make personal and financial decisions for you. Please seek the advice of an  if you need this type of document.    **Advance Directives are not required and no one may discriminate against you if you do not sign one.    Medical Orders  Many states allow specific forms/orders signed by your physician to record your wishes for medical treatment in your current state of health. This form, signed in personal communication with your physician, addresses resuscitation and other medical interventions that you may or may not want.      For more information or to schedule a time with a [facility] Advance Care Planning Facilitator contact: [email or web address] or call [phone] and someone will contact you directly.

## 2021-06-16 NOTE — PROGRESS NOTES
The ABCs of the Annual Wellness Visit  Subsequent Medicare Wellness Visit    Chief Complaint   Patient presents with   • Medicare Wellness-subsequent   • Diabetes       Subjective   History of Present Illness:  Keisha Padilla is a 71 y.o. female who presents for a Subsequent Medicare Wellness Visit.    HEALTH RISK ASSESSMENT    Recent Hospitalizations:  No hospitalization(s) within the last year.    Current Medical Providers:  Patient Care Team:  Ximena Felix DO as PCP - General (Family Medicine)  Ori Sandhu MD as Consulting Physician (Ophthalmology)  Titi Lombardo MD as Obstetrician (Obstetrics and Gynecology)    Smoking Status:  Social History     Tobacco Use   Smoking Status Never Smoker   Smokeless Tobacco Never Used       Alcohol Consumption:  Social History     Substance and Sexual Activity   Alcohol Use No       Depression Screen:   PHQ-2/PHQ-9 Depression Screening 6/16/2021   Little interest or pleasure in doing things 0   Feeling down, depressed, or hopeless 1   Trouble falling or staying asleep, or sleeping too much -   Feeling tired or having little energy -   Poor appetite or overeating -   Feeling bad about yourself - or that you are a failure or have let yourself or your family down -   Trouble concentrating on things, such as reading the newspaper or watching television -   Moving or speaking so slowly that other people could have noticed. Or the opposite - being so fidgety or restless that you have been moving around a lot more than usual -   Thoughts that you would be better off dead, or of hurting yourself in some way -   Total Score 1   If you checked off any problems, how difficult have these problems made it for you to do your work, take care of things at home, or get along with other people? -       Fall Risk Screen:  STEADI Fall Risk Assessment was completed, and patient is at MODERATE risk for falls. Assessment completed on:6/16/2021    Health Habits and Functional and  Cognitive Screening:  Functional & Cognitive Status 6/16/2021   Do you have difficulty preparing food and eating? No   Do you have difficulty bathing yourself, getting dressed or grooming yourself? No   Do you have difficulty using the toilet? No   Do you have difficulty moving around from place to place? No   Do you have trouble with steps or getting out of a bed or a chair? No   Current Diet Well Balanced Diet   Dental Exam Up to date   Eye Exam Up to date   Exercise (times per week) -   Current Exercise Activities Include Walking   Do you need help using the phone?  No   Are you deaf or do you have serious difficulty hearing?  No   Do you need help with transportation? No   Do you need help shopping? No   Do you need help preparing meals?  No   Do you need help with housework?  No   Do you need help with laundry? No   Do you need help taking your medications? No   Do you need help managing money? No   Do you ever drive or ride in a car without wearing a seat belt? No   Have you felt unusual stress, anger or loneliness in the last month? No   Who do you live with? Spouse   If you need help, do you have trouble finding someone available to you? No   Have you been bothered in the last four weeks by sexual problems? No   Do you have difficulty concentrating, remembering or making decisions? No         Does the patient have evidence of cognitive impairment? No    Asprin use counseling:Taking ASA appropriately as indicated    Age-appropriate Screening Schedule:  Refer to the list below for future screening recommendations based on patient's age, sex and/or medical conditions. Orders for these recommended tests are listed in the plan section. The patient has been provided with a written plan.    Health Maintenance   Topic Date Due   • DIABETIC FOOT EXAM  04/11/2020   • URINE MICROALBUMIN  04/11/2020   • DXA SCAN  04/04/2021   • LIPID PANEL  06/15/2021   • INFLUENZA VACCINE  08/01/2021   • HEMOGLOBIN A1C  12/16/2021   •  DIABETIC EYE EXAM  03/29/2022   • MAMMOGRAM  08/10/2022   • TDAP/TD VACCINES (3 - Td or Tdap) 10/03/2026   • ZOSTER VACCINE  Discontinued          The following portions of the patient's history were reviewed and updated as appropriate: allergies, current medications, past family history, past medical history, past social history, past surgical history and problem list.    Outpatient Medications Prior to Visit   Medication Sig Dispense Refill   • aspirin 81 MG tablet Take 1 tablet by mouth Daily. 90 tablet 3   • budesonide-formoterol (SYMBICORT) 160-4.5 MCG/ACT inhaler Inhale 2 puffs 2 (two) times a day. 1 inhaler 0   • Cholecalciferol (VITAMIN D3) 2000 UNITS capsule Take 1 capsule by mouth daily.     • diclofenac (VOLTAREN) 75 MG EC tablet Take 1 tablet by mouth Daily As Needed (OA Pain). 90 tablet 3   • folic acid (FOLVITE) 1 MG tablet folic acid 1 mg tablet   Daily     • hydrOXYzine (ATARAX) 25 MG tablet hydroxyzine HCl 25 mg tablet   Daily     • timolol (TIMOPTIC) 0.5 % ophthalmic solution INSTILL 1 DROP EVERY 12 HOURS     • dilTIAZem CD (Cartia XT) 240 MG 24 hr capsule Take 1 capsule by mouth Daily. 90 capsule 3   • glipizide (GLUCOTROL) 5 MG tablet Take one po bid 180 tablet 3   • lisinopril (PRINIVIL,ZESTRIL) 20 MG tablet Take 1 tablet by mouth Daily. 90 tablet 3   • simvastatin (ZOCOR) 20 MG tablet Take 1 tablet by mouth Every Night. 90 tablet 3   • latanoprost (XALATAN) 0.005 % ophthalmic solution latanoprost 0.005 % eye drops   1 drop both eyes 530 pm       No facility-administered medications prior to visit.       Patient Active Problem List   Diagnosis   • Endometrial cancer (CMS/HCC)   • Primary open angle glaucoma   • Annual GYN exam TVHUSO   • Hypertension   • Diabetes mellitus (CMS/HCC)   • Hyperlipidemia       Advanced Care Planning:  ACP discussion was held with the patient during this visit. Patient does not have an advance directive, information provided.    Review of Systems   Constitutional:  "Negative.  Negative for activity change, fatigue, fever and unexpected weight change.   HENT: Negative.  Negative for congestion, sneezing and sore throat.    Eyes: Negative.  Negative for visual disturbance.   Respiratory: Negative.  Negative for cough, chest tightness, shortness of breath and wheezing.    Cardiovascular: Negative.  Negative for chest pain, palpitations and leg swelling.   Gastrointestinal: Negative.  Negative for abdominal distention, abdominal pain, blood in stool, constipation, diarrhea and nausea.   Endocrine: Negative.  Negative for cold intolerance and heat intolerance.   Genitourinary: Negative.  Negative for dysuria, frequency and urgency.   Musculoskeletal: Negative.  Negative for arthralgias and myalgias.   Skin: Negative.  Negative for rash.   Allergic/Immunologic: Negative.    Neurological: Negative.  Negative for dizziness, syncope and numbness.   Hematological: Negative.  Negative for adenopathy.   Psychiatric/Behavioral: Negative.  Negative for suicidal ideas. The patient is not nervous/anxious.        Compared to one year ago, the patient feels her physical health is the same.  Compared to one year ago, the patient feels her mental health is the same.    Reviewed chart for potential of high risk medication in the elderly: yes  Reviewed chart for potential of harmful drug interactions in the elderly:yes    Objective         Vitals:    06/16/21 0812   BP: 130/76   BP Location: Left arm   Patient Position: Sitting   Cuff Size: Large Adult   Pulse: 64   Resp: 18   Temp: 97.7 °F (36.5 °C)   TempSrc: Temporal   SpO2: 99%   Weight: 97.4 kg (214 lb 12.8 oz)   Height: 149.9 cm (59\")   PainSc: 0-No pain       Body mass index is 43.38 kg/m².  Discussed the patient's BMI with her. The BMI is above average; BMI management plan is completed.    Physical Exam  Vitals and nursing note reviewed.   Constitutional:       General: She is not in acute distress.     Appearance: She is well-developed. She " is not diaphoretic.   HENT:      Right Ear: External ear normal.      Left Ear: External ear normal.      Nose: Nose normal.   Eyes:      Conjunctiva/sclera: Conjunctivae normal.      Pupils: Pupils are equal, round, and reactive to light.   Neck:      Thyroid: No thyromegaly.   Cardiovascular:      Rate and Rhythm: Normal rate and regular rhythm.      Heart sounds: Normal heart sounds. No murmur heard.     Pulmonary:      Effort: Pulmonary effort is normal. No respiratory distress.      Breath sounds: Normal breath sounds. No wheezing.   Abdominal:      General: Bowel sounds are normal. There is no distension.      Palpations: Abdomen is soft. There is no mass.      Tenderness: There is no abdominal tenderness. There is no guarding or rebound.      Hernia: No hernia is present.   Musculoskeletal:         General: No tenderness. Normal range of motion.      Cervical back: Normal range of motion and neck supple.   Lymphadenopathy:      Cervical: No cervical adenopathy.   Skin:     General: Skin is warm and dry.      Coloration: Skin is not pale.      Findings: No erythema or rash.   Neurological:      Mental Status: She is alert and oriented to person, place, and time.      Deep Tendon Reflexes: Reflexes are normal and symmetric.   Psychiatric:         Behavior: Behavior normal.         Thought Content: Thought content normal.         Judgment: Judgment normal.         HBAIC 6.1  Lab Results   Component Value Date    HGBA1C 6.1 06/16/2021        Assessment/Plan   Medicare Risks and Personalized Health Plan  CMS Preventative Services Quick Reference  Cardiovascular risk  Diabetic Lab Screening     The above risks/problems have been discussed with the patient.  Pertinent information has been shared with the patient in the After Visit Summary.  Follow up plans and orders are seen below in the Assessment/Plan Section.    Diagnoses and all orders for this visit:    1. Encounter for subsequent annual wellness visit in  Medicare patient (Primary)    2. Essential hypertension  -     dilTIAZem CD (Cartia XT) 240 MG 24 hr capsule; Take 1 capsule by mouth Daily.  Dispense: 90 capsule; Refill: 3  -     lisinopril (PRINIVIL,ZESTRIL) 20 MG tablet; Take 1 tablet by mouth Daily.  Dispense: 90 tablet; Refill: 3  -     CBC & Differential  -     Comprehensive Metabolic Panel    3. Mixed hyperlipidemia  -     simvastatin (ZOCOR) 20 MG tablet; Take 1 tablet by mouth Every Night.  Dispense: 90 tablet; Refill: 3  -     Lipid Panel  -     TSH    4. Type 2 diabetes mellitus without complication, without long-term current use of insulin (CMS/[facility]ly [facility])  -     glipizide (GLUCOTROL) 5 MG tablet; Take one po bid  Dispense: 180 tablet; Refill: 3  -     lisinopril (PRINIVIL,ZESTRIL) 20 MG tablet; Take 1 tablet by mouth Daily.  Dispense: 90 tablet; Refill: 3  -     POC Glycosylated Hemoglobin (Hb A1C)    OK to decrease glipizide to 5 mg qd  Patient is here for health maintenance visit.  Discussed diet and adequate exercise.  Screening lab work discussed.  Immunizations reviewed.  Advice and education regarding nutrition, exercise, dental evaluations, routine eye examinations, reproductive health, cardiovascular risk reduction, sunscreen use, self skin examination, and seatbelt use was given today.  Annual wellness evaluations recommended.    Follow Up:  Return in about 24 weeks (around 12/1/2021).     An After Visit Summary and PPPS were given to the patient.

## 2021-07-09 ENCOUNTER — TRANSCRIBE ORDERS (OUTPATIENT)
Dept: ADMINISTRATIVE | Facility: HOSPITAL | Age: 71
End: 2021-07-09

## 2021-07-09 DIAGNOSIS — Z12.31 VISIT FOR SCREENING MAMMOGRAM: Primary | ICD-10-CM

## 2021-07-21 DIAGNOSIS — I10 ESSENTIAL HYPERTENSION: ICD-10-CM

## 2021-07-21 DIAGNOSIS — E11.9 TYPE 2 DIABETES MELLITUS WITHOUT COMPLICATION, WITHOUT LONG-TERM CURRENT USE OF INSULIN (HCC): ICD-10-CM

## 2021-07-21 RX ORDER — DILTIAZEM HYDROCHLORIDE 240 MG/1
CAPSULE, COATED, EXTENDED RELEASE ORAL
Qty: 90 CAPSULE | Refills: 0 | Status: SHIPPED | OUTPATIENT
Start: 2021-07-21 | End: 2021-07-22 | Stop reason: SDUPTHER

## 2021-07-21 RX ORDER — GLIPIZIDE 5 MG/1
TABLET ORAL
Qty: 180 TABLET | Refills: 0 | Status: SHIPPED | OUTPATIENT
Start: 2021-07-21 | End: 2021-07-22 | Stop reason: SDUPTHER

## 2021-07-22 DIAGNOSIS — I10 ESSENTIAL HYPERTENSION: ICD-10-CM

## 2021-07-22 DIAGNOSIS — E11.9 TYPE 2 DIABETES MELLITUS WITHOUT COMPLICATION, WITHOUT LONG-TERM CURRENT USE OF INSULIN (HCC): ICD-10-CM

## 2021-07-22 RX ORDER — GLIPIZIDE 5 MG/1
TABLET ORAL
Qty: 180 TABLET | Refills: 0 | Status: SHIPPED | OUTPATIENT
Start: 2021-07-22 | End: 2022-06-22 | Stop reason: SDUPTHER

## 2021-07-22 RX ORDER — DILTIAZEM HYDROCHLORIDE 240 MG/1
240 CAPSULE, COATED, EXTENDED RELEASE ORAL DAILY
Qty: 90 CAPSULE | Refills: 0 | Status: SHIPPED | OUTPATIENT
Start: 2021-07-22 | End: 2022-06-22 | Stop reason: SDUPTHER

## 2021-08-16 ENCOUNTER — HOSPITAL ENCOUNTER (OUTPATIENT)
Dept: MAMMOGRAPHY | Facility: HOSPITAL | Age: 71
Discharge: HOME | End: 2021-08-16
Admitting: FAMILY MEDICINE

## 2021-08-16 DIAGNOSIS — Z12.31 VISIT FOR SCREENING MAMMOGRAM: ICD-10-CM

## 2021-08-16 PROCEDURE — 77067 SCR MAMMO BI INCL CAD: CPT | Performed by: RADIOLOGY

## 2021-08-16 PROCEDURE — 77063 BREAST TOMOSYNTHESIS BI: CPT | Performed by: RADIOLOGY

## 2021-08-16 PROCEDURE — 77063 BREAST TOMOSYNTHESIS BI: CPT

## 2021-08-16 PROCEDURE — 77067 SCR MAMMO BI INCL CAD: CPT

## 2021-08-18 ENCOUNTER — TELEPHONE (OUTPATIENT)
Dept: FAMILY MEDICINE CLINIC | Facility: CLINIC | Age: 71
End: 2021-08-18

## 2021-08-18 RX ORDER — FLUCONAZOLE 150 MG/1
150 TABLET ORAL ONCE
Qty: 1 TABLET | Refills: 0 | Status: SHIPPED | OUTPATIENT
Start: 2021-08-18 | End: 2021-08-18

## 2021-08-18 NOTE — TELEPHONE ENCOUNTER
Caller: Randy Keishaana Scherer    Relationship: Self    Best call back number: [phone]     What medication are you requesting: SOMETHING TO TREAT A YEAST INFECTION    What are your current symptoms: YEAST INFECTION    How long have you been experiencing symptoms: A FEW DAYS    Have you had these symptoms before:    [x] Yes  [] No    Have you been treated for these symptoms before:   [x] Yes  [] No    If a prescription is needed, what is your preferred pharmacy and phone number: [facility] [address] [place] [address] [phone] [facility] [phone]      Additional notes:

## 2022-06-22 ENCOUNTER — OFFICE VISIT (OUTPATIENT)
Dept: FAMILY MEDICINE CLINIC | Facility: CLINIC | Age: 72
End: 2022-06-22

## 2022-06-22 ENCOUNTER — LAB (OUTPATIENT)
Dept: LAB | Facility: HOSPITAL | Age: 72
End: 2022-06-22

## 2022-06-22 VITALS
BODY MASS INDEX: 41.93 KG/M2 | DIASTOLIC BLOOD PRESSURE: 68 MMHG | SYSTOLIC BLOOD PRESSURE: 116 MMHG | HEART RATE: 69 BPM | HEIGHT: 59 IN | WEIGHT: 208 LBS | OXYGEN SATURATION: 98 % | TEMPERATURE: 98 F

## 2022-06-22 DIAGNOSIS — E78.2 MIXED HYPERLIPIDEMIA: ICD-10-CM

## 2022-06-22 DIAGNOSIS — I10 ESSENTIAL HYPERTENSION: ICD-10-CM

## 2022-06-22 DIAGNOSIS — E11.9 TYPE 2 DIABETES MELLITUS WITHOUT COMPLICATION, WITHOUT LONG-TERM CURRENT USE OF INSULIN: ICD-10-CM

## 2022-06-22 DIAGNOSIS — Z00.00 MEDICARE ANNUAL WELLNESS VISIT, SUBSEQUENT: Primary | ICD-10-CM

## 2022-06-22 DIAGNOSIS — E55.9 VITAMIN D DEFICIENCY: ICD-10-CM

## 2022-06-22 DIAGNOSIS — Z00.00 ROUTINE GENERAL MEDICAL EXAMINATION AT A HEALTH CARE FACILITY: Primary | ICD-10-CM

## 2022-06-22 PROBLEM — H43.819 POSTERIOR VITREOUS DETACHMENT: Status: ACTIVE | Noted: 2022-03-28

## 2022-06-22 LAB
25(OH)D3 SERPL-MCNC: 80.2 NG/ML (ref 30–100)
ALBUMIN SERPL-MCNC: 4.1 G/DL (ref 3.5–5.2)
ALBUMIN/GLOB SERPL: 1.5 G/DL
ALP SERPL-CCNC: 86 U/L (ref 39–117)
ALT SERPL W P-5'-P-CCNC: 14 U/L (ref 1–33)
ANION GAP SERPL CALCULATED.3IONS-SCNC: 11.8 MMOL/L (ref 5–15)
AST SERPL-CCNC: 25 U/L (ref 1–32)
BASOPHILS # BLD AUTO: 0.03 10*3/MM3 (ref 0–0.2)
BASOPHILS NFR BLD AUTO: 0.4 % (ref 0–1.5)
BILIRUB SERPL-MCNC: 0.5 MG/DL (ref 0–1.2)
BUN SERPL-MCNC: 13 MG/DL (ref 8–23)
BUN/CREAT SERPL: 16.5 (ref 7–25)
CALCIUM SPEC-SCNC: 9.5 MG/DL (ref 8.6–10.5)
CHLORIDE SERPL-SCNC: 104 MMOL/L (ref 98–107)
CHOLEST SERPL-MCNC: 161 MG/DL (ref 0–200)
CO2 SERPL-SCNC: 23.2 MMOL/L (ref 22–29)
CREAT SERPL-MCNC: 0.79 MG/DL (ref 0.57–1)
DEPRECATED RDW RBC AUTO: 43.9 FL (ref 37–54)
EGFRCR SERPLBLD CKD-EPI 2021: 78.6 ML/MIN/1.73
EOSINOPHIL # BLD AUTO: 0.21 10*3/MM3 (ref 0–0.4)
EOSINOPHIL NFR BLD AUTO: 2.9 % (ref 0.3–6.2)
ERYTHROCYTE [DISTWIDTH] IN BLOOD BY AUTOMATED COUNT: 13 % (ref 12.3–15.4)
GLOBULIN UR ELPH-MCNC: 2.7 GM/DL
GLUCOSE SERPL-MCNC: 94 MG/DL (ref 65–99)
HBA1C MFR BLD: 6 % (ref 4.8–5.6)
HCT VFR BLD AUTO: 44.6 % (ref 34–46.6)
HDLC SERPL-MCNC: 61 MG/DL (ref 40–60)
HGB BLD-MCNC: 14.8 G/DL (ref 12–15.9)
IMM GRANULOCYTES # BLD AUTO: 0.03 10*3/MM3 (ref 0–0.05)
IMM GRANULOCYTES NFR BLD AUTO: 0.4 % (ref 0–0.5)
LDLC SERPL CALC-MCNC: 80 MG/DL (ref 0–100)
LDLC/HDLC SERPL: 1.28 {RATIO}
LYMPHOCYTES # BLD AUTO: 2.24 10*3/MM3 (ref 0.7–3.1)
LYMPHOCYTES NFR BLD AUTO: 30.8 % (ref 19.6–45.3)
MCH RBC QN AUTO: 31.1 PG (ref 26.6–33)
MCHC RBC AUTO-ENTMCNC: 33.2 G/DL (ref 31.5–35.7)
MCV RBC AUTO: 93.7 FL (ref 79–97)
MONOCYTES # BLD AUTO: 0.58 10*3/MM3 (ref 0.1–0.9)
MONOCYTES NFR BLD AUTO: 8 % (ref 5–12)
NEUTROPHILS NFR BLD AUTO: 4.19 10*3/MM3 (ref 1.7–7)
NEUTROPHILS NFR BLD AUTO: 57.5 % (ref 42.7–76)
NRBC BLD AUTO-RTO: 0 /100 WBC (ref 0–0.2)
PLATELET # BLD AUTO: 227 10*3/MM3 (ref 140–450)
PMV BLD AUTO: 11.7 FL (ref 6–12)
POTASSIUM SERPL-SCNC: 4.8 MMOL/L (ref 3.5–5.2)
PROT SERPL-MCNC: 6.8 G/DL (ref 6–8.5)
RBC # BLD AUTO: 4.76 10*6/MM3 (ref 3.77–5.28)
SODIUM SERPL-SCNC: 139 MMOL/L (ref 136–145)
TRIGL SERPL-MCNC: 110 MG/DL (ref 0–150)
VLDLC SERPL-MCNC: 20 MG/DL (ref 5–40)
WBC NRBC COR # BLD: 7.28 10*3/MM3 (ref 3.4–10.8)

## 2022-06-22 PROCEDURE — 1170F FXNL STATUS ASSESSED: CPT | Performed by: FAMILY MEDICINE

## 2022-06-22 PROCEDURE — 82306 VITAMIN D 25 HYDROXY: CPT | Performed by: FAMILY MEDICINE

## 2022-06-22 PROCEDURE — G0439 PPPS, SUBSEQ VISIT: HCPCS | Performed by: FAMILY MEDICINE

## 2022-06-22 PROCEDURE — 83036 HEMOGLOBIN GLYCOSYLATED A1C: CPT | Performed by: FAMILY MEDICINE

## 2022-06-22 PROCEDURE — 80061 LIPID PANEL: CPT | Performed by: FAMILY MEDICINE

## 2022-06-22 PROCEDURE — 1126F AMNT PAIN NOTED NONE PRSNT: CPT | Performed by: FAMILY MEDICINE

## 2022-06-22 PROCEDURE — 1159F MED LIST DOCD IN RCRD: CPT | Performed by: FAMILY MEDICINE

## 2022-06-22 PROCEDURE — 85025 COMPLETE CBC W/AUTO DIFF WBC: CPT | Performed by: FAMILY MEDICINE

## 2022-06-22 PROCEDURE — 80053 COMPREHEN METABOLIC PANEL: CPT | Performed by: FAMILY MEDICINE

## 2022-06-22 RX ORDER — SIMVASTATIN 20 MG
20 TABLET ORAL NIGHTLY
Qty: 90 TABLET | Refills: 3 | Status: SHIPPED | OUTPATIENT
Start: 2022-06-22

## 2022-06-22 RX ORDER — LISINOPRIL 20 MG/1
20 TABLET ORAL DAILY
Qty: 90 TABLET | Refills: 3 | Status: SHIPPED | OUTPATIENT
Start: 2022-06-22

## 2022-06-22 RX ORDER — GLIPIZIDE 5 MG/1
TABLET ORAL
Qty: 180 TABLET | Refills: 3 | Status: SHIPPED | OUTPATIENT
Start: 2022-06-22

## 2022-06-22 RX ORDER — DILTIAZEM HYDROCHLORIDE 240 MG/1
240 CAPSULE, COATED, EXTENDED RELEASE ORAL DAILY
Qty: 90 CAPSULE | Refills: 3 | Status: SHIPPED | OUTPATIENT
Start: 2022-06-22

## 2022-06-22 RX ORDER — TIMOLOL MALEATE 5 MG/ML
SOLUTION/ DROPS OPHTHALMIC EVERY 12 HOURS
COMMUNITY
End: 2022-06-22 | Stop reason: SDUPTHER

## 2022-06-22 NOTE — PATIENT INSTRUCTIONS
Diabetes Mellitus and Nutrition, Adult  When you have diabetes, or diabetes mellitus, it is very important to have healthy eating habits because your blood sugar (glucose) levels are greatly affected by what you eat and drink. Eating healthy foods in the right amounts, at about the same times every day, can help you:  Control your blood glucose.  Lower your risk of heart disease.  Improve your blood pressure.  Reach or maintain a healthy weight.  What can affect my meal plan?  Every person with diabetes is different, and each person has different needs for a meal plan. Your health care provider may recommend that you work with a dietitian to make a meal plan that is best for you. Your meal plan may vary depending on factors such as:  The calories you need.  The medicines you take.  Your weight.  Your blood glucose, blood pressure, and cholesterol levels.  Your activity level.  Other health conditions you have, such as heart or kidney disease.  How do carbohydrates affect me?  Carbohydrates, also called carbs, affect your blood glucose level more than any other type of food. Eating carbs naturally raises the amount of glucose in your blood. Carb counting is a method for keeping track of how many carbs you eat. Counting carbs is important to keep your blood glucose at a healthy level, especially if you use insulin or take certain oral diabetes medicines.  It is important to know how many carbs you can safely have in each meal. This is different for every person. Your dietitian can help you calculate how many carbs you should have at each meal and for each snack.  How does alcohol affect me?  Alcohol can cause a sudden decrease in blood glucose (hypoglycemia), especially if you use insulin or take certain oral diabetes medicines. Hypoglycemia can be a life-threatening condition. Symptoms of hypoglycemia, such as sleepiness, dizziness, and confusion, are similar to symptoms of having too much alcohol.  Do not drink  "alcohol if:  Your health care provider tells you not to drink.  You are pregnant, may be pregnant, or are planning to become pregnant.  If you drink alcohol:  Do not drink on an empty stomach.  Limit how much you use to:  0-1 drink a day for women.  0-2 drinks a day for men.  Be aware of how much alcohol is in your drink. In the [place] one drink equals one 12 oz bottle of beer (355 mL), one 5 oz glass of wine (148 mL), or one 1½ oz glass of hard liquor (44 mL).  Keep yourself hydrated with water, diet soda, or unsweetened iced tea.  Keep in mind that regular soda, juice, and other mixers may contain a lot of sugar and must be counted as carbs.  What are tips for following this plan?    Reading food labels  Start by checking the serving size on the \"Nutrition Facts\" label of packaged foods and drinks. The amount of calories, carbs, fats, and other nutrients listed on the label is based on one serving of the item. Many items contain more than one serving per package.  Check the total grams (g) of carbs in one serving. You can calculate the number of servings of carbs in one serving by dividing the total carbs by 15. For example, if a food has 30 g of total carbs per serving, it would be equal to 2 servings of carbs.  Check the number of grams (g) of saturated fats and trans fats in one serving. Choose foods that have a low amount or none of these fats.  Check the number of milligrams (mg) of salt (sodium) in one serving. Most people should limit total sodium intake to less than 2,300 mg per day.  Always check the nutrition information of foods labeled as \"low-fat\" or \"nonfat.\" These foods may be higher in added sugar or refined carbs and should be avoided.  Talk to your dietitian to identify your daily goals for nutrients listed on the label.  Shopping  Avoid buying canned, pre-made, or processed foods. These foods tend to be high in fat, sodium, and added sugar.  Shop around the outside edge of the grocery store. This " is where you will most often find fresh fruits and vegetables, bulk grains, fresh meats, and fresh dairy.  Cooking  Use low-heat cooking methods, such as baking, instead of high-heat cooking methods like deep frying.  Cook using healthy oils, such as olive, canola, or sunflower oil.  Avoid cooking with butter, cream, or high-fat meats.  Meal planning  Eat meals and snacks regularly, preferably at the same times every day. Avoid going long periods of time without eating.  Eat foods that are high in fiber, such as fresh fruits, vegetables, beans, and whole grains. Talk with your dietitian about how many servings of carbs you can eat at each meal.  Eat 4-6 oz (112-168 g) of lean protein each day, such as lean meat, chicken, fish, eggs, or tofu. One ounce (oz) of lean protein is equal to:  1 oz (28 g) of meat, chicken, or fish.  1 egg.  ¼ cup (62 g) of tofu.  Eat some foods each day that contain healthy fats, such as avocado, nuts, seeds, and fish.  What foods should I eat?  Fruits  Berries. Apples. Oranges. Peaches. Apricots. Plums. Grapes. Moquino. Papaya. Pomegranate. Kiwi. Cherries.  Vegetables  Lettuce. Spinach. Leafy greens, including kale, chard, koffi greens, and mustard greens. Beets. Cauliflower. Cabbage. Broccoli. Carrots. Green beans. Tomatoes. Peppers. Onions. Cucumbers. Ralph sprouts.  Grains  Whole grains, such as whole-wheat or whole-grain bread, crackers, tortillas, cereal, and pasta. Unsweetened oatmeal. Quinoa. Brown or wild rice.  Meats and other proteins  Seafood. Poultry without skin. Lean cuts of poultry and beef. Tofu. Nuts. Seeds.  Dairy  Low-fat or fat-free dairy products such as milk, yogurt, and cheese.  The items listed above may not be a complete list of foods and beverages you can eat. Contact a dietitian for more information.  What foods should I avoid?  Fruits  Fruits canned with syrup.  Vegetables  Canned vegetables. Frozen vegetables with butter or cream sauce.  Grains  Refined  white flour and flour products such as bread, pasta, snack foods, and cereals. Avoid all processed foods.  Meats and other proteins  Fatty cuts of meat. Poultry with skin. Breaded or fried meats. Processed meat. Avoid saturated fats.  Dairy  Full-fat yogurt, cheese, or milk.  Beverages  Sweetened drinks, such as soda or iced tea.  The items listed above may not be a complete list of foods and beverages you should avoid. Contact a dietitian for more information.  Questions to ask a health care provider  Do I need to meet with a diabetes educator?  Do I need to meet with a dietitian?  What number can I call if I have questions?  When are the best times to check my blood glucose?  Where to find more information:  American Diabetes Association: diabetes.org  Academy of Nutrition and Dietetics: www.eatright.org  National [place] of Diabetes and Digestive and Kidney Diseases: www.niddk.nih.gov  Association of Diabetes Care and Education Specialists: www.diabeteseducator.org  Summary  It is important to have healthy eating habits because your blood sugar (glucose) levels are greatly affected by what you eat and drink.  A healthy meal plan will help you control your blood glucose and maintain a healthy lifestyle.  Your health care provider may recommend that you work with a dietitian to make a meal plan that is best for you.  Keep in mind that carbohydrates (carbs) and alcohol have immediate effects on your blood glucose levels. It is important to count carbs and to use alcohol carefully.  This information is not intended to replace advice given to you by your health care provider. Make sure you discuss any questions you have with your health care provider.  Document Revised: 11/24/2020 Document Reviewed: 11/24/2020  Elsevier Patient Education © 2021 [employer]  Hypertension, Adult  High blood pressure (hypertension) is when the force of blood pumping through the arteries is too strong. The arteries are the blood  "vessels that carry blood from the heart throughout the body. Hypertension forces the heart to work harder to pump blood and may cause arteries to become narrow or stiff. Untreated or uncontrolled hypertension can cause a heart attack, heart failure, a stroke, kidney disease, and other problems.  A blood pressure reading consists of a higher number over a lower number. Ideally, your blood pressure should be below 120/80. The first (\"top\") number is called the systolic pressure. It is a measure of the pressure in your arteries as your heart beats. The second (\"bottom\") number is called the diastolic pressure. It is a measure of the pressure in your arteries as the heart relaxes.  What are the causes?  The exact cause of this condition is not known. There are some conditions that result in or are related to high blood pressure.  What increases the risk?  Some risk factors for high blood pressure are under your control. The following factors may make you more likely to develop this condition:  Smoking.  Having type 2 diabetes mellitus, high cholesterol, or both.  Not getting enough exercise or physical activity.  Being overweight.  Having too much fat, sugar, calories, or salt (sodium) in your diet.  Drinking too much alcohol.  Some risk factors for high blood pressure may be difficult or impossible to change. Some of these factors include:  Having chronic kidney disease.  Having a family history of high blood pressure.  Age. Risk increases with age.  Race. You may be at higher risk if you are .  Gender. Men are at higher risk than women before age 45. After age 65, women are at higher risk than men.  Having obstructive sleep apnea.  Stress.  What are the signs or symptoms?  High blood pressure may not cause symptoms. Very high blood pressure (hypertensive crisis) may cause:  Headache.  Anxiety.  Shortness of breath.  Nosebleed.  Nausea and vomiting.  Vision changes.  Severe chest pain.  Seizures.  How " is this diagnosed?  This condition is diagnosed by measuring your blood pressure while you are seated, with your arm resting on a flat surface, your legs uncrossed, and your feet flat on the floor. The cuff of the blood pressure monitor will be placed directly against the skin of your upper arm at the level of your heart. It should be measured at least twice using the same arm. Certain conditions can cause a difference in blood pressure between your right and left arms.  Certain factors can cause blood pressure readings to be lower or higher than normal for a short period of time:  When your blood pressure is higher when you are in a health care provider's office than when you are at home, this is called white coat hypertension. Most people with this condition do not need medicines.  When your blood pressure is higher at home than when you are in a health care provider's office, this is called masked hypertension. Most people with this condition may need medicines to control blood pressure.  If you have a high blood pressure reading during one visit or you have normal blood pressure with other risk factors, you may be asked to:  Return on a different day to have your blood pressure checked again.  Monitor your blood pressure at home for 1 week or longer.  If you are diagnosed with hypertension, you may have other blood or imaging tests to help your health care provider understand your overall risk for other conditions.  How is this treated?  This condition is treated by making healthy lifestyle changes, such as eating healthy foods, exercising more, and reducing your alcohol intake. Your health care provider may prescribe medicine if lifestyle changes are not enough to get your blood pressure under control, and if:  Your systolic blood pressure is above 130.  Your diastolic blood pressure is above 80.  Your personal target blood pressure may vary depending on your medical conditions, your age, and other  factors.  Follow these instructions at home:  Eating and drinking    Eat a diet that is high in fiber and potassium, and low in sodium, added sugar, and fat. An example eating plan is called the DASH (Dietary Approaches to Stop Hypertension) diet. To eat this way:  Eat plenty of fresh fruits and vegetables. Try to fill one half of your plate at each meal with fruits and vegetables.  Eat whole grains, such as whole-wheat pasta, brown rice, or whole-grain bread. Fill about one fourth of your plate with whole grains.  Eat or drink low-fat dairy products, such as skim milk or low-fat yogurt.  Avoid fatty cuts of meat, processed or cured meats, and poultry with skin. Fill about one fourth of your plate with lean proteins, such as fish, chicken without skin, beans, eggs, or tofu.  Avoid pre-made and processed foods. These tend to be higher in sodium, added sugar, and fat.  Reduce your daily sodium intake. Most people with hypertension should eat less than 1,500 mg of sodium a day.  Do not drink alcohol if:  Your health care provider tells you not to drink.  You are pregnant, may be pregnant, or are planning to become pregnant.  If you drink alcohol:  Limit how much you use to:  0-1 drink a day for women.  0-2 drinks a day for men.  Be aware of how much alcohol is in your drink. In the [place] one drink equals one 12 oz bottle of beer (355 mL), one 5 oz glass of wine (148 mL), or one 1½ oz glass of hard liquor (44 mL).    Lifestyle    Work with your health care provider to maintain a healthy body weight or to lose weight. Ask what an ideal weight is for you.  Get at least 30 minutes of exercise most days of the week. Activities may include walking, swimming, or biking.  Include exercise to strengthen your muscles (resistance exercise), such as Pilates or lifting weights, as part of your weekly exercise routine. Try to do these types of exercises for 30 minutes at least 3 days a week.  Do not use any products that contain  nicotine or tobacco, such as cigarettes, e-cigarettes, and chewing tobacco. If you need help quitting, ask your health care provider.  Monitor your blood pressure at home as told by your health care provider.  Keep all follow-up visits as told by your health care provider. This is important.    Medicines  Take over-the-counter and prescription medicines only as told by your health care provider. Follow directions carefully. Blood pressure medicines must be taken as prescribed.  Do not skip doses of blood pressure medicine. Doing this puts you at risk for problems and can make the medicine less effective.  Ask your health care provider about side effects or reactions to medicines that you should watch for.  Contact a health care provider if you:  Think you are having a reaction to a medicine you are taking.  Have headaches that keep coming back (recurring).  Feel dizzy.  Have swelling in your ankles.  Have trouble with your vision.  Get help right away if you:  Develop a severe headache or confusion.  Have unusual weakness or numbness.  Feel faint.  Have severe pain in your chest or abdomen.  Vomit repeatedly.  Have trouble breathing.  Summary  Hypertension is when the force of blood pumping through your arteries is too strong. If this condition is not controlled, it may put you at risk for serious complications.  Your personal target blood pressure may vary depending on your medical conditions, your age, and other factors. For most people, a normal blood pressure is less than 120/80.  Hypertension is treated with lifestyle changes, medicines, or a combination of both. Lifestyle changes include losing weight, eating a healthy, low-sodium diet, exercising more, and limiting alcohol.  This information is not intended to replace advice given to you by your health care provider. Make sure you discuss any questions you have with your health care provider.  Document Revised: 08/28/2019 Document Reviewed:  "08/28/2019  [employer] Patient Education © 2021 [employer]  BMI for Adults  What is BMI?  Body mass index (BMI) is a number that is calculated from a person's weight and height. BMI can help estimate how much of a person's weight is composed of fat. BMI does not measure body fat directly. Rather, it is an alternative to procedures that directly measure body fat, which can be difficult and expensive.  BMI can help identify people who may be at higher risk for certain medical problems.  What are BMI measurements used for?  BMI is used as a screening tool to identify possible weight problems. It helps determine whether a person is obese, overweight, a healthy weight, or underweight.  BMI is useful for:  Identifying a weight problem that may be related to a medical condition or may increase the risk for medical problems.  Promoting changes, such as changes in diet and exercise, to help reach a healthy weight. BMI screening can be repeated to see if these changes are working.  How is BMI calculated?  BMI involves measuring your weight in relation to your height. Both height and weight are measured, and the BMI is calculated from those numbers. This can be done either in English ([place] or metric measurements. Note that charts and online BMI calculators are available to help you find your BMI quickly and easily without having to do these calculations yourself.  To calculate your BMI in English ([place] measurements:    Measure your weight in pounds (lb).  Multiply the number of pounds by 703.  For example, for a person who weighs 180 lb, multiply that number by 703, which equals 126,540.  Measure your height in inches. Then multiply that number by itself to get a measurement called \"inches squared.\"  For example, for a person who is 70 inches tall, the \"inches squared\" measurement is 70 inches x 70 inches, which equals 4,900 inches squared.  Divide the total from step 2 (number of lb x 703) by the total from step 3 (inches " "squared): 126,540 ÷ 4,900 = 25.8. This is your BMI.    To calculate your BMI in metric measurements:  Measure your weight in kilograms (kg).  Measure your height in meters (m). Then multiply that number by itself to get a measurement called \"meters squared.\"  For example, for a person who is 1.75 m tall, the \"meters squared\" measurement is 1.75 m x 1.75 m, which is equal to 3.1 meters squared.  Divide the number of kilograms (your weight) by the meters squared number. In this example: 70 ÷ 3.1 = 22.6. This is your BMI.  What do the results mean?  BMI charts are used to identify whether you are underweight, normal weight, overweight, or obese. The following guidelines will be used:  Underweight: BMI less than 18.5.  Normal weight: BMI between 18.5 and 24.9.  Overweight: BMI between 25 and 29.9.  Obese: BMI of 30 or above.  Keep these notes in mind:  Weight includes both fat and muscle, so someone with a muscular build, such as an athlete, may have a BMI that is higher than 24.9. In cases like these, BMI is not an accurate measure of body fat.  To determine if excess body fat is the cause of a BMI of 25 or higher, further assessments may need to be done by a health care provider.  BMI is usually interpreted in the same way for men and women.  Where to find more information  For more information about BMI, including tools to quickly calculate your BMI, go to these websites:  Centers for Disease Control and Prevention: www.cdc.gov  American Heart Association: www.heart.org  National Heart, Lung, and Blood [place]: www.nhlbi.nih.gov  Summary  Body mass index (BMI) is a number that is calculated from a person's weight and height.  BMI may help estimate how much of a person's weight is composed of fat. BMI can help identify those who may be at higher risk for certain medical problems.  BMI can be measured using English measurements or metric measurements.  BMI charts are used to identify whether you are underweight, " normal weight, overweight, or obese.  This information is not intended to replace advice given to you by your health care provider. Make sure you discuss any questions you have with your health care provider.  Document Revised: 09/09/2020 Document Reviewed: 07/17/2020  [employer] Patient Education © 2021 [employer]    Advance Care Planning and Advance Directives     You make decisions on a daily basis - decisions about where you want to live, your career, your home, your life. Perhaps one of the most important decisions you face is your choice for future medical care. Take time to talk with your family and your healthcare team and start planning today.  Advance Care Planning is a process that can help you:  Understand possible future healthcare decisions in light of your own experiences  Reflect on those decision in light of your goals and values  Discuss your decisions with those closest to you and the healthcare professionals that care for you  Make a plan by creating a document that reflects your wishes    Surrogate Decision Maker  In the event of a medical emergency, which has left you unable to communicate or to make your own decisions, you would need someone to make decisions for you.  It is important to discuss your preferences for medical treatment with this person while you are in good health.     Qualities of a surrogate decision maker:  Willing to take on this role and responsibility  Knows what you want for future medical care  Willing to follow your wishes even if they don't agree with them  Able to make difficult medical decisions under stressful circumstances    Advance Directives  These are legal documents you can create that will guide your healthcare team and decision maker(s) when needed. These documents can be stored in the electronic medical record.    Living Will - a legal document to guide your care if you have a terminal condition or a serious illness and are unable to communicate. States  "vary by statute in document names/types, but most forms may include one or more of the following:        -  Directions regarding life-prolonging treatments        -  Directions regarding artificially provided nutrition/hydration        -  Choosing a healthcare decision maker        -  Direction regarding organ/tissue donation    Durable Power of  for Healthcare - this document names an -in-fact to make medical decisions for you, but it may also allow this person to make personal and financial decisions for you. Please seek the advice of an  if you need this type of document.    **Advance Directives are not required and no one may discriminate against you if you do not sign one.    Medical Orders  Many states allow specific forms/orders signed by your physician to record your wishes for medical treatment in your current state of health. This form, signed in personal communication with your physician, addresses resuscitation and other medical interventions that you may or may not want.      For more information or to schedule a time with a [facility] Advance Care Planning Facilitator contact: [employer]/ACP or call [phone] and someone will contact you directly.Critical care medicine: Principles of diagnosis and management in the adult (4th ed., pp. 6484-9327). Price.\"> Hendrickson's anesthesia (8th ed., pp. 232-250). Price.\">   Advance Directive    Advance directives are legal documents that allow you to make decisions about your health care and medical treatment in case you become unable to communicate for yourself. Advance directives let your wishes be known to family, friends, and health care providers.  Discussing and writing advance directives should happen over time rather than all at once. Advance directives can be changed and updated at any time. There are different types of advance directives, such as:  Medical power of .  Living will.  Do not resuscitate (DNR) " order or do not attempt resuscitation (DNAR) order.  Health care proxy and medical power of   A health care proxy is also called a health care agent. This person is appointed to make medical decisions for you when you are unable to make decisions for yourself. Generally, people ask a trusted friend or family member to act as their proxy and represent their preferences. Make sure you have an agreement with your trusted person to act as your proxy. A proxy may have to make a medical decision on your behalf if your wishes are not known.  A medical power of , also called a durable power of  for health care, is a legal document that names your health care proxy. Depending on the laws in your state, the document may need to be:  Signed.  Notarized.  Dated.  Copied.  Witnessed.  Incorporated into your medical record.  You may also want to appoint a trusted person to manage your money in the event you are unable to do so. This is called a durable power of  for finances. It is a separate legal document from the durable power of  for health care. You may choose your health care proxy or someone different to act as your agent in money matters.  If you do not appoint a proxy, or there is a concern that the proxy is not acting in your best interest, a court may appoint a guardian to act on your behalf.  Living will  A living will is a set of instructions that state your wishes about medical care when you cannot express them yourself. Health care providers should keep a copy of your living will in your medical record. You may want to give a copy to family members or friends. To alert caregivers in case of an emergency, you can place a card in your wallet to let them know that you have a living will and where they can find it. A living will is used if you become:  Terminally ill.  Disabled.  Unable to communicate or make decisions.  The following decisions should be included in your living  will:  To use or not to use life support equipment, such as dialysis machines and breathing machines (ventilators).  Whether you want a DNR or DNAR order. This tells health care providers not to use cardiopulmonary resuscitation (CPR) if breathing or heartbeat stops.  To use or not to use tube feeding.  To be given or not to be given food and fluids.  Whether you want comfort (palliative) care when the goal becomes comfort rather than a cure.  Whether you want to donate your organs and tissues.  A living will does not give instructions for distributing your money and property if you should pass away.  DNR or DNAR  A DNR or DNAR order is a request not to have CPR in the event that your heart stops beating or you stop breathing. If a DNR or DNAR order has not been made and shared, a health care provider will try to help any patient whose heart has stopped or who has stopped breathing. If you plan to have surgery, talk with your health care provider about how your DNR or DNAR order will be followed if problems occur.  What if I do not have an advance directive?  Some states assign family decision makers to act on your behalf if you do not have an advance directive. Each state has its own laws about advance directives. You may want to check with your health care provider, , or state representative about the laws in your state.  Summary  Advance directives are legal documents that allow you to make decisions about your health care and medical treatment in case you become unable to communicate for yourself.  The process of discussing and writing advance directives should happen over time. You can change and update advance directives at any time.  Advance directives may include a medical power of , a living will, and a DNR or DNAR order.  This information is not intended to replace advice given to you by your health care provider. Make sure you discuss any questions you have with your health care  provider.  Document Revised: 2021 Document Reviewed: 2021  Elsevier Patient Education ©  [employer]    Medicare Wellness  Personal Prevention Plan of Service     Date of Office Visit:    Encounter Provider:  Stoney Diggs MD  Place of Service:  [facility]  Patient Name: Keisha Padilla  :  1950    As part of the Medicare Wellness portion of your visit today, we are providing you with this personalized preventive plan of services (PPPS). This plan is based upon recommendations of the [place] Preventive Services Task Force (USPSTF) and the Advisory Committee on Immunization Practices (ACIP).    This lists the preventive care services that should be considered, and provides dates of when you are due. Items listed as completed are up-to-date and do not require any further intervention.    Health Maintenance   Topic Date Due    DIABETIC FOOT EXAM  2020    URINE MICROALBUMIN  2020    HEMOGLOBIN A1C  2021    ANNUAL WELLNESS VISIT  2022    LIPID PANEL  2022    DXA SCAN  2022 (Originally 2021)    INFLUENZA VACCINE  10/01/2022    DIABETIC EYE EXAM  2023    MAMMOGRAM  2023    TDAP/TD VACCINES (3 - Td or Tdap) 10/03/2026    COLORECTAL CANCER SCREENING  2028    HEPATITIS C SCREENING  Completed    COVID-19 Vaccine  Completed    Pneumococcal Vaccine 65+  Completed    ZOSTER VACCINE  Discontinued       Orders Placed This Encounter   Procedures    Comprehensive Metabolic Panel     Order Specific Question:   Release to patient     Answer:   Immediate    Hemoglobin A1c     Order Specific Question:   Release to patient     Answer:   Immediate    Lipid Panel    Vitamin D 25 Hydroxy     Order Specific Question:   Release to patient     Answer:   Immediate    MicroAlbumin, Urine, Random - Urine, Clean Catch     Order Specific Question:   Release to patient     Answer:   Immediate    CBC & Differential      Order Specific Question:   Manual Differential     Answer:   No       Return in about 6 months (around 12/22/2022) for Follow-up HTN, HLD, DM.

## 2022-06-22 NOTE — PROGRESS NOTES
The ABCs of the Annual Wellness Visit  Subsequent Medicare Wellness Visit    Chief Complaint   Patient presents with   • Medicare Wellness-subsequent      Subjective    History of Present Illness:  Keisha Padilla is a 72 y.o. female who presents for a Subsequent Medicare Wellness Visit.    The following portions of the patient's history were reviewed and   updated as appropriate: allergies, current medications, past family history, past medical history, past social history, past surgical history and problem list.    Compared to one year ago, the patient feels her physical   health is the same. Patient does not check her blood pressure or blood sugar at home. She takes all medications as directed with no adverse effects.     Compared to one year ago, the patient feels her mental   health is the same.    Recent Hospitalizations:  She was not admitted to the hospital during the last year.       Current Medical Providers:  Patient Care Team:  Stoney Diggs MD as PCP - General (Family Medicine)  Ori Sandhu MD as Consulting Physician (Ophthalmology)  Titi Lombardo MD as Obstetrician (Obstetrics and Gynecology)    Outpatient Medications Prior to Visit   Medication Sig Dispense Refill   • aspirin 81 MG tablet Take 1 tablet by mouth Daily. 90 tablet 3   • Cholecalciferol (VITAMIN D3) 2000 UNITS capsule Take 1 capsule by mouth daily.     • diclofenac (VOLTAREN) 75 MG EC tablet Take 1 tablet by mouth Daily As Needed (OA Pain). 90 tablet 3   • folic acid (FOLVITE) 1 MG tablet folic acid 1 mg tablet   Daily     • latanoprost (XALATAN) 0.005 % ophthalmic solution latanoprost 0.005 % eye drops   1 drop both eyes 530 pm     • timolol (TIMOPTIC) 0.5 % ophthalmic solution INSTILL 1 DROP EVERY 12 HOURS     • budesonide-formoterol (SYMBICORT) 160-4.5 MCG/ACT inhaler Inhale 2 puffs 2 (two) times a day. 1 inhaler 0   • dilTIAZem CD (CARDIZEM CD) 240 MG 24 hr capsule Take 1 capsule by mouth Daily. 90 capsule 0   •  glipizide (GLUCOTROL) 5 MG tablet Take 1 tablet by mouth twice daily 180 tablet 0   • hydrOXYzine (ATARAX) 25 MG tablet hydroxyzine HCl 25 mg tablet   Daily     • lisinopril (PRINIVIL,ZESTRIL) 20 MG tablet Take 1 tablet by mouth Daily. 90 tablet 3   • simvastatin (ZOCOR) 20 MG tablet Take 1 tablet by mouth Every Night. 90 tablet 3     No facility-administered medications prior to visit.       No opioid medication identified on active medication list. I have reviewed chart for other potential  high risk medication/s and harmful drug interactions in the elderly.          Aspirin is on active medication list. Aspirin use is indicated based on review of current medical condition/s. Pros and cons of this therapy have been discussed today. Benefits of this medication outweigh potential harm.  Patient has been encouraged to continue taking this medication.  .      Patient Active Problem List   Diagnosis   • Morbid obesity (HCC)   • Vitamin D deficiency   • Endometrial cancer (HCC)   • Primary open angle glaucoma   • Annual GYN exam TVHUSO   • Hypertension   • Diabetes mellitus (HCC)   • Hyperlipidemia   • Posterior vitreous detachment   • Medicare annual wellness visit, subsequent     Advance Care Planning  Advance Directive is not on file.  ACP discussion was held with the patient during this visit. Patient does not have an advance directive, information provided.    Review of Systems   Constitutional: Negative for activity change, chills, fatigue and fever.   HENT: Negative for congestion, postnasal drip, sinus pressure and sore throat.    Eyes: Negative for visual disturbance.   Respiratory: Negative for shortness of breath and wheezing.    Cardiovascular: Negative for chest pain, palpitations and leg swelling.   Gastrointestinal: Negative for abdominal pain, blood in stool, constipation, diarrhea, nausea and vomiting.   Genitourinary: Negative for difficulty urinating, frequency and urgency.   Musculoskeletal: Negative  "for arthralgias, gait problem and myalgias.   Skin: Negative for rash.   Neurological: Negative for dizziness, seizures, weakness and confusion.   Psychiatric/Behavioral: Negative for decreased concentration and suicidal ideas. The patient is not nervous/anxious.         Objective    Vitals:    06/22/22 0835   BP: 116/68   Pulse: 69   Temp: 98 °F (36.7 °C)   SpO2: 98%   Weight: 94.3 kg (208 lb)   Height: 149.9 cm (59\")   PainSc: 0-No pain     Estimated body mass index is 42.01 kg/m² as calculated from the following:    Height as of this encounter: 149.9 cm (59\").    Weight as of this encounter: 94.3 kg (208 lb).    Class 3 Severe Obesity (BMI >=40). Obesity-related health conditions include the following: hypertension, diabetes mellitus, dyslipidemias and osteoarthritis. Obesity is improving with lifestyle modifications. BMI is elevated and patient will work on diet and exercise. . We discussed low calorie, low carb based diet program, portion control and increasing exercise.      Does the patient have evidence of cognitive impairment? No    Physical Exam  Constitutional:       General: She is not in acute distress.     Appearance: She is well-developed. She is obese. She is not diaphoretic.   HENT:      Head: Atraumatic.   Cardiovascular:      Rate and Rhythm: Normal rate and regular rhythm.      Heart sounds: Normal heart sounds. No murmur heard.    No friction rub. No gallop.   Pulmonary:      Effort: Pulmonary effort is normal. No respiratory distress.      Breath sounds: Normal breath sounds. No stridor. No wheezing, rhonchi or rales.   Abdominal:      General: Bowel sounds are normal. There is no distension.      Palpations: Abdomen is soft. There is no mass.      Tenderness: There is no abdominal tenderness. There is no guarding or rebound.      Hernia: No hernia is present.   Musculoskeletal:      Cervical back: Normal range of motion and neck supple.   Skin:     General: Skin is warm and dry. "   Neurological:      Mental Status: She is alert and oriented to person, place, and time.   Psychiatric:         Behavior: Behavior normal.                 HEALTH RISK ASSESSMENT    Smoking Status:  Social History     Tobacco Use   Smoking Status Never Smoker   Smokeless Tobacco Never Used     Alcohol Consumption:  Social History     Substance and Sexual Activity   Alcohol Use No     Fall Risk Screen:    DREWANDREW Fall Risk Assessment was completed, and patient is at LOW risk for falls.Assessment completed on:6/22/2022    Depression Screening:  PHQ-2/PHQ-9 Depression Screening 6/22/2022   Retired PHQ-9 Total Score -   Retired Total Score -   Little Interest or Pleasure in Doing Things 0-->not at all   Feeling Down, Depressed or Hopeless 0-->not at all   PHQ-9: Brief Depression Severity Measure Score 0       Health Habits and Functional and Cognitive Screening:  Functional & Cognitive Status 6/22/2022   Do you have difficulty preparing food and eating? No   Do you have difficulty bathing yourself, getting dressed or grooming yourself? No   Do you have difficulty using the toilet? No   Do you have difficulty moving around from place to place? No   Do you have trouble with steps or getting out of a bed or a chair? No   Current Diet Well Balanced Diet   Dental Exam Up to date   Eye Exam Up to date   Exercise (times per week) 3 times per week   Current Exercises Include Walking   Current Exercise Activities Include -   Do you need help using the phone?  No   Are you deaf or do you have serious difficulty hearing?  No   Do you need help with transportation? No   Do you need help shopping? No   Do you need help preparing meals?  No   Do you need help with housework?  No   Do you need help with laundry? No   Do you need help taking your medications? No   Do you need help managing money? No   Do you ever drive or ride in a car without wearing a seat belt? No   Have you felt unusual stress, anger or loneliness in the last month?  No   Who do you live with? Spouse   If you need help, do you have trouble finding someone available to you? No   Have you been bothered in the last four weeks by sexual problems? No   Do you have difficulty concentrating, remembering or making decisions? No       Age-appropriate Screening Schedule:  Refer to the list below for future screening recommendations based on patient's age, sex and/or medical conditions. Orders for these recommended tests are listed in the plan section. The patient has been provided with a written plan.    Health Maintenance   Topic Date Due   • DIABETIC FOOT EXAM  04/11/2020   • URINE MICROALBUMIN  04/11/2020   • HEMOGLOBIN A1C  12/16/2021   • LIPID PANEL  06/16/2022   • DXA SCAN  06/22/2022 (Originally 4/4/2021)   • INFLUENZA VACCINE  10/01/2022   • DIABETIC EYE EXAM  03/28/2023   • MAMMOGRAM  08/16/2023   • TDAP/TD VACCINES (3 - Td or Tdap) 10/03/2026   • ZOSTER VACCINE  Discontinued              Assessment & Plan   CMS Preventative Services Quick Reference  Risk Factors Identified During Encounter  Obesity/Overweight   The above risks/problems have been discussed with the patient.  Follow up actions/plans if indicated are seen below in the Assessment/Plan Section.  Pertinent information has been shared with the patient in the After Visit Summary.    Diagnoses and all orders for this visit:    1. Medicare annual wellness visit, subsequent (Primary)  Assessment & Plan:  The patient is here for health maintenance visit.  Currently, the patient consumes a healthy diet and has an inadequate exercise regimen.  Screening lab work is ordered.  Immunizations were reviewed today.  Advice and education was given regarding nutrition, aerobic exercise, routine dental evaluations, routine eye exams, reproductive health, cardiovascular risk reduction, sunscreen use, self skin examination (annual dermatology evaluations) and seatbelt use (general overall safety).  Further recommendations will be given  if needed after lab evaluation.  Annual wellness evaluation is recommended.      Orders:  -     CBC & Differential  -     Comprehensive Metabolic Panel  -     Hemoglobin A1c  -     Lipid Panel  -     Vitamin D 25 Hydroxy  -     MicroAlbumin, Urine, Random - Urine, Clean Catch    2. Type 2 diabetes mellitus without complication, without long-term current use of insulin (HCC)  -     glipizide (GLUCOTROL) 5 MG tablet; Take 1 tablet by mouth twice daily  Dispense: 180 tablet; Refill: 3  -     lisinopril (PRINIVIL,ZESTRIL) 20 MG tablet; Take 1 tablet by mouth Daily.  Dispense: 90 tablet; Refill: 3  -     CBC & Differential  -     Comprehensive Metabolic Panel  -     Hemoglobin A1c  -     Lipid Panel  -     MicroAlbumin, Urine, Random - Urine, Clean Catch    3. Mixed hyperlipidemia  -     simvastatin (ZOCOR) 20 MG tablet; Take 1 tablet by mouth Every Night.  Dispense: 90 tablet; Refill: 3  -     CBC & Differential  -     Comprehensive Metabolic Panel  -     Lipid Panel    4. Essential hypertension  -     lisinopril (PRINIVIL,ZESTRIL) 20 MG tablet; Take 1 tablet by mouth Daily.  Dispense: 90 tablet; Refill: 3  -     dilTIAZem CD (CARDIZEM CD) 240 MG 24 hr capsule; Take 1 capsule by mouth Daily.  Dispense: 90 capsule; Refill: 3  -     CBC & Differential  -     Comprehensive Metabolic Panel  -     Hemoglobin A1c  -     Lipid Panel    5. Vitamin D deficiency  -     Vitamin D 25 Hydroxy      Follow Up:   Return in about 6 months (around 12/22/2022) for Follow-up HTN, HLD, DM.     An After Visit Summary and PPPS were made available to the patient.

## 2022-06-28 ENCOUNTER — TELEPHONE (OUTPATIENT)
Dept: FAMILY MEDICINE CLINIC | Facility: CLINIC | Age: 72
End: 2022-06-28

## 2022-07-18 ENCOUNTER — TRANSCRIBE ORDERS (OUTPATIENT)
Dept: ADMINISTRATIVE | Facility: HOSPITAL | Age: 72
End: 2022-07-18

## 2022-07-18 ENCOUNTER — TELEPHONE (OUTPATIENT)
Dept: FAMILY MEDICINE CLINIC | Facility: CLINIC | Age: 72
End: 2022-07-18

## 2022-07-18 DIAGNOSIS — Z12.31 VISIT FOR SCREENING MAMMOGRAM: Primary | ICD-10-CM

## 2022-07-18 DIAGNOSIS — B37.31 YEAST INFECTION OF THE VAGINA: Primary | ICD-10-CM

## 2022-07-18 RX ORDER — FLUCONAZOLE 150 MG/1
150 TABLET ORAL ONCE
Qty: 2 TABLET | Refills: 0 | Status: SHIPPED | OUTPATIENT
Start: 2022-07-18 | End: 2022-07-18

## 2022-07-18 NOTE — TELEPHONE ENCOUNTER
Caller: Pipe Padilla    Relationship: Emergency Contact    Best call back number:[phone]    What are your current symptoms:   YEAST INFECTION     How long have you been experiencing symptoms:   10 DAYS     Have you had these symptoms before:    [x] Yes  [] No    Have you been treated for these symptoms before:   [x] Yes  [] No    If a prescription is needed, what is your preferred pharmacy and phone number:    [address] [phone] [facility] [phone]   [phone]    Additional notes:  PATIENT'S () PIPE STATED THAT PATIENT HAS BEEN TAKING OVER THE COUNTER MEDICATION THAT THIS NOT HELPING WITH A YEAST INFECTION AND PATIENT WOULD LIKE FOR MEDICATION TO BE CALLED INTO PHARMACY TO HELP

## 2022-08-17 ENCOUNTER — HOSPITAL ENCOUNTER (OUTPATIENT)
Dept: MAMMOGRAPHY | Facility: HOSPITAL | Age: 72
Discharge: HOME | End: 2022-08-17
Admitting: FAMILY MEDICINE

## 2022-08-17 DIAGNOSIS — Z12.31 VISIT FOR SCREENING MAMMOGRAM: ICD-10-CM

## 2022-08-17 PROCEDURE — 77067 SCR MAMMO BI INCL CAD: CPT | Performed by: RADIOLOGY

## 2022-08-17 PROCEDURE — 77067 SCR MAMMO BI INCL CAD: CPT

## 2022-08-17 PROCEDURE — 77063 BREAST TOMOSYNTHESIS BI: CPT

## 2022-08-17 PROCEDURE — 77063 BREAST TOMOSYNTHESIS BI: CPT | Performed by: RADIOLOGY

## 2023-06-01 DIAGNOSIS — E78.2 MIXED HYPERLIPIDEMIA: ICD-10-CM

## 2023-06-01 DIAGNOSIS — E11.9 TYPE 2 DIABETES MELLITUS WITHOUT COMPLICATION, WITHOUT LONG-TERM CURRENT USE OF INSULIN: ICD-10-CM

## 2023-06-01 DIAGNOSIS — I10 ESSENTIAL HYPERTENSION: ICD-10-CM

## 2023-06-01 RX ORDER — GLIPIZIDE 5 MG/1
TABLET ORAL
Qty: 180 TABLET | Refills: 0 | Status: SHIPPED | OUTPATIENT
Start: 2023-06-01

## 2023-06-01 RX ORDER — LISINOPRIL 20 MG/1
TABLET ORAL
Qty: 90 TABLET | Refills: 0 | Status: SHIPPED | OUTPATIENT
Start: 2023-06-01

## 2023-06-01 RX ORDER — SIMVASTATIN 20 MG
TABLET ORAL
Qty: 90 TABLET | Refills: 0 | Status: SHIPPED | OUTPATIENT
Start: 2023-06-01

## 2023-06-01 RX ORDER — DILTIAZEM HYDROCHLORIDE 240 MG/1
240 CAPSULE, COATED, EXTENDED RELEASE ORAL DAILY
Qty: 90 CAPSULE | Refills: 0 | Status: SHIPPED | OUTPATIENT
Start: 2023-06-01

## 2023-09-13 ENCOUNTER — HOSPITAL ENCOUNTER (OUTPATIENT)
Dept: MAMMOGRAPHY | Facility: HOSPITAL | Age: 73
Discharge: HOME | End: 2023-09-13
Admitting: FAMILY MEDICINE
Payer: MEDICARE

## 2023-09-13 DIAGNOSIS — Z12.31 VISIT FOR SCREENING MAMMOGRAM: ICD-10-CM

## 2023-09-13 PROCEDURE — 77063 BREAST TOMOSYNTHESIS BI: CPT

## 2023-09-13 PROCEDURE — 77067 SCR MAMMO BI INCL CAD: CPT

## 2024-01-15 ENCOUNTER — OFFICE VISIT (OUTPATIENT)
Dept: FAMILY MEDICINE CLINIC | Facility: CLINIC | Age: 74
End: 2024-01-15
Payer: MEDICARE

## 2024-01-15 VITALS
BODY MASS INDEX: 38.38 KG/M2 | WEIGHT: 190 LBS | TEMPERATURE: 97.5 F | SYSTOLIC BLOOD PRESSURE: 118 MMHG | DIASTOLIC BLOOD PRESSURE: 62 MMHG | HEART RATE: 68 BPM

## 2024-01-15 DIAGNOSIS — E11.9 TYPE 2 DIABETES MELLITUS WITHOUT COMPLICATION, WITHOUT LONG-TERM CURRENT USE OF INSULIN: Primary | ICD-10-CM

## 2024-01-15 DIAGNOSIS — I10 PRIMARY HYPERTENSION: ICD-10-CM

## 2024-01-15 DIAGNOSIS — M79.622 LEFT UPPER ARM PAIN: ICD-10-CM

## 2024-01-15 DIAGNOSIS — E78.2 MIXED HYPERLIPIDEMIA: ICD-10-CM

## 2024-01-15 LAB
EXPIRATION DATE: NORMAL
HBA1C MFR BLD: 5.7 % (ref 4.5–5.7)
Lab: NORMAL

## 2024-01-15 PROCEDURE — 3074F SYST BP LT 130 MM HG: CPT | Performed by: FAMILY MEDICINE

## 2024-01-15 PROCEDURE — 1160F RVW MEDS BY RX/DR IN RCRD: CPT | Performed by: FAMILY MEDICINE

## 2024-01-15 PROCEDURE — 83036 HEMOGLOBIN GLYCOSYLATED A1C: CPT | Performed by: FAMILY MEDICINE

## 2024-01-15 PROCEDURE — 3078F DIAST BP <80 MM HG: CPT | Performed by: FAMILY MEDICINE

## 2024-01-15 PROCEDURE — 3044F HG A1C LEVEL LT 7.0%: CPT | Performed by: FAMILY MEDICINE

## 2024-01-15 PROCEDURE — 1159F MED LIST DOCD IN RCRD: CPT | Performed by: FAMILY MEDICINE

## 2024-01-15 PROCEDURE — 99214 OFFICE O/P EST MOD 30 MIN: CPT | Performed by: FAMILY MEDICINE

## 2024-01-15 RX ORDER — HYDROXYZINE HYDROCHLORIDE 25 MG/1
TABLET, FILM COATED ORAL DAILY
COMMUNITY

## 2024-01-15 RX ORDER — LISINOPRIL 20 MG/1
20 TABLET ORAL DAILY
Qty: 90 TABLET | Refills: 3 | Status: SHIPPED | OUTPATIENT
Start: 2024-01-15

## 2024-01-15 RX ORDER — FOLIC ACID 1 MG/1
TABLET ORAL DAILY
COMMUNITY

## 2024-01-15 RX ORDER — DILTIAZEM HYDROCHLORIDE 240 MG/1
240 CAPSULE, COATED, EXTENDED RELEASE ORAL DAILY
Qty: 90 CAPSULE | Refills: 3 | Status: SHIPPED | OUTPATIENT
Start: 2024-01-15

## 2024-01-15 RX ORDER — GLIPIZIDE 5 MG/1
5 TABLET ORAL 2 TIMES DAILY
Qty: 180 TABLET | Refills: 3 | Status: SHIPPED | OUTPATIENT
Start: 2024-01-15

## 2024-01-15 RX ORDER — SIMVASTATIN 20 MG
20 TABLET ORAL NIGHTLY
Qty: 90 TABLET | Refills: 3 | Status: SHIPPED | OUTPATIENT
Start: 2024-01-15

## 2024-01-15 NOTE — PROGRESS NOTES
Keisha Padilla is a 73 y.o. female who presents today for Hypertension, Diabetes, and Hyperlipidemia      Patient has been taking glipizide as directed but does not check her sugar at home. She has not been checking blood pressure at home but has been taking medication as directed. She takes statin nightly and tolerates this well. She eats a healthy and varied diet. She does not exercise. She has had some left arm pain when she sleeps. She is unsure if she sleeps on this side. She states its a throbbing in nature and does not radiate. She has not had the pain during the day. Has has no injury and no change in activity level. She has not found anything she can do that makes it happen. It happened about twice week for about two months but has not happened in about two weeks. She has no associated symptoms. It resolves quickly after she wakes up. She has taken an aspirin for it but is unsure if the aspirin helped or if it went away on its on. She denies CP, SOA, dizziness, palpitations, edema, wheezing, vision changes, or HA.         Review of Systems   Constitutional:  Negative for fever and unexpected weight loss.   HENT:  Negative for congestion, ear pain and sore throat.    Eyes:  Negative for visual disturbance.   Respiratory:  Negative for cough, shortness of breath and wheezing.    Cardiovascular:  Negative for chest pain and palpitations.   Gastrointestinal:  Negative for abdominal pain, blood in stool, constipation, diarrhea, nausea, vomiting and GERD.   Endocrine: Negative for polydipsia and polyuria.   Genitourinary:  Negative for difficulty urinating.   Musculoskeletal:  Positive for arthralgias (left bicep pain). Negative for joint swelling.   Skin:  Negative for rash and skin lesions.   Allergic/Immunologic: Negative for environmental allergies.   Neurological:  Negative for seizures and syncope.   Hematological:  Does not bruise/bleed easily.   Psychiatric/Behavioral:  Negative for suicidal ideas.          The following portions of the patient's history were reviewed and updated as appropriate: allergies, current medications, past family history, past medical history, past social history, past surgical history and problem list.    Current Outpatient Medications on File Prior to Visit   Medication Sig Dispense Refill    aspirin 81 MG tablet Take 1 tablet by mouth Daily. 90 tablet 3    Cholecalciferol (VITAMIN D3) 2000 UNITS capsule Take 1 capsule by mouth Daily.      diclofenac (VOLTAREN) 75 MG EC tablet Take 1 tablet by mouth Daily As Needed (OA Pain). 90 tablet 3    folic acid (FOLVITE) 1 MG tablet Daily.      hydrOXYzine (ATARAX) 25 MG tablet Daily.      latanoprost (XALATAN) 0.005 % ophthalmic solution latanoprost 0.005 % eye drops   1 drop both eyes 530 pm      timolol (TIMOPTIC) 0.5 % ophthalmic solution INSTILL 1 DROP EVERY 12 HOURS      [DISCONTINUED] dilTIAZem CD (CARDIZEM CD) 240 MG 24 hr capsule Take 1 capsule by mouth Daily. 90 capsule 3    [DISCONTINUED] glipizide (GLUCOTROL) 5 MG tablet Take 1 tablet by mouth 2 (Two) Times a Day. 180 tablet 3    [DISCONTINUED] lisinopril (PRINIVIL,ZESTRIL) 20 MG tablet Take 1 tablet by mouth Daily. 90 tablet 3    [DISCONTINUED] simvastatin (ZOCOR) 20 MG tablet Take 1 tablet by mouth Every Night. 90 tablet 3     No current facility-administered medications on file prior to visit.       Allergies   Allergen Reactions    Sulfa Antibiotics Hives     Mother and daughter are allergic to it, she just avoids it    Augmentin [Amoxicillin-Pot Clavulanate] Nausea And Vomiting        Visit Vitals  /62   Pulse 68   Temp 97.5 °F (36.4 °C) (Temporal)   Wt 86.2 kg (190 lb)   BMI 38.38 kg/m²        Physical Exam  Constitutional:       General: She is not in acute distress.     Appearance: She is well-developed. She is not diaphoretic.   HENT:      Head: Atraumatic.   Cardiovascular:      Rate and Rhythm: Normal rate and regular rhythm.      Heart sounds: Normal heart sounds. No  murmur heard.     No friction rub. No gallop.   Pulmonary:      Effort: Pulmonary effort is normal. No respiratory distress.      Breath sounds: Normal breath sounds. No stridor. No wheezing, rhonchi or rales.   Musculoskeletal:      Right shoulder: Normal.      Left shoulder: Normal.      Right upper arm: Normal.      Left upper arm: Normal. No swelling, edema, deformity, lacerations, tenderness or bony tenderness.      Cervical back: Normal range of motion and neck supple.   Skin:     General: Skin is warm and dry.   Neurological:      Mental Status: She is alert and oriented to person, place, and time.   Psychiatric:         Behavior: Behavior normal.          Results for orders placed or performed in visit on 01/15/24   POC Glycosylated Hemoglobin (Hb A1C)    Specimen: Blood   Result Value Ref Range    Hemoglobin A1C 5.7 4.5 - 5.7 %    Lot Number [id number]     Expiration Date 4,042,022         Problems Addressed this Visit          Cardiac and Vasculature    Hypertension     Hypertension is improving with treatment.  Continue current treatment regimen.  Blood pressure will be reassessed at the next regular appointment.         Relevant Medications    lisinopril (PRINIVIL,ZESTRIL) 20 MG tablet    dilTIAZem CD (CARDIZEM CD) 240 MG 24 hr capsule    Hyperlipidemia     Lipid abnormalities are unchanged.  Pharmacotherapy as ordered.  Lipids will be reassessed in 6 months.         Relevant Medications    simvastatin (ZOCOR) 20 MG tablet       Endocrine and Metabolic    Diabetes mellitus - Primary     Diabetes is improving with treatment.   Continue current treatment regimen.  Diabetes will be reassessed in 6 months.         Relevant Medications    lisinopril (PRINIVIL,ZESTRIL) 20 MG tablet    glipizide (GLUCOTROL) 5 MG tablet    Other Relevant Orders    POC Glycosylated Hemoglobin (Hb A1C) (Completed)       Musculoskeletal and Injuries    Left upper arm pain     Suspect the left arm pain is likely secondary to  sleeping position as it resolves quickly upon awakening, only occurs when she is sleeping, and has no associated symptoms.  Patient was counseled to work on sleeping in a neutral position.  RTC/ED precautions given.          Diagnoses         Codes Comments    Type 2 diabetes mellitus without complication, without long-term current use of insulin    -  Primary ICD-10-CM: E11.9  ICD-9-CM: 250.00     Primary hypertension     ICD-10-CM: I10  ICD-9-CM: 401.9     Mixed hyperlipidemia     ICD-10-CM: E78.2  ICD-9-CM: 272.2     Left upper arm pain     ICD-10-CM: M79.622  ICD-9-CM: 729.5             Return for Next scheduled follow up.    Stoney Diggs MD   1/15/2024

## 2024-01-15 NOTE — PATIENT INSTRUCTIONS
Diabetes Mellitus and Nutrition, Adult  When you have diabetes, or diabetes mellitus, it is very important to have healthy eating habits because your blood sugar (glucose) levels are greatly affected by what you eat and drink. Eating healthy foods in the right amounts, at about the same times every day, can help you:  Manage your blood glucose.  Lower your risk of heart disease.  Improve your blood pressure.  Reach or maintain a healthy weight.  What can affect my meal plan?  Every person with diabetes is different, and each person has different needs for a meal plan. Your health care provider may recommend that you work with a dietitian to make a meal plan that is best for you. Your meal plan may vary depending on factors such as:  The calories you need.  The medicines you take.  Your weight.  Your blood glucose, blood pressure, and cholesterol levels.  Your activity level.  Other health conditions you have, such as heart or kidney disease.  How do carbohydrates affect me?  Carbohydrates, also called carbs, affect your blood glucose level more than any other type of food. Eating carbs raises the amount of glucose in your blood.  It is important to know how many carbs you can safely have in each meal. This is different for every person. Your dietitian can help you calculate how many carbs you should have at each meal and for each snack.  How does alcohol affect me?  Alcohol can cause a decrease in blood glucose (hypoglycemia), especially if you use insulin or take certain diabetes medicines by mouth. Hypoglycemia can be a life-threatening condition. Symptoms of hypoglycemia, such as sleepiness, dizziness, and confusion, are similar to symptoms of having too much alcohol.  Do not drink alcohol if:  Your health care provider tells you not to drink.  You are pregnant, may be pregnant, or are planning to become pregnant.  If you drink alcohol:  Limit how much you have to:  0-1 drink a day for women.  0-2 drinks a day  "for men.  Know how much alcohol is in your drink. In the [place] one drink equals one 12 oz bottle of beer (355 mL), one 5 oz glass of wine (148 mL), or one 1½ oz glass of hard liquor (44 mL).  Keep yourself hydrated with water, diet soda, or unsweetened iced tea. Keep in mind that regular soda, juice, and other mixers may contain a lot of sugar and must be counted as carbs.  What are tips for following this plan?    Reading food labels  Start by checking the serving size on the Nutrition Facts label of packaged foods and drinks. The number of calories and the amount of carbs, fats, and other nutrients listed on the label are based on one serving of the item. Many items contain more than one serving per package.  Check the total grams (g) of carbs in one serving.  Check the number of grams of saturated fats and trans fats in one serving. Choose foods that have a low amount or none of these fats.  Check the number of milligrams (mg) of salt (sodium) in one serving. Most people should limit total sodium intake to less than 2,300 mg per day.  Always check the nutrition information of foods labeled as \"low-fat\" or \"nonfat.\" These foods may be higher in added sugar or refined carbs and should be avoided.  Talk to your dietitian to identify your daily goals for nutrients listed on the label.  Shopping  Avoid buying canned, pre-made, or processed foods. These foods tend to be high in fat, sodium, and added sugar.  Shop around the outside edge of the grocery store. This is where you will most often find fresh fruits and vegetables, bulk grains, fresh meats, and fresh dairy products.  Cooking  Use low-heat cooking methods, such as baking, instead of high-heat cooking methods, such as deep frying.  Cook using healthy oils, such as olive, canola, or sunflower oil.  Avoid cooking with butter, cream, or high-fat meats.  Meal planning  Eat meals and snacks regularly, preferably at the same times every day. Avoid going long periods " of time without eating.  Eat foods that are high in fiber, such as fresh fruits, vegetables, beans, and whole grains.  Eat 4-6 oz (112-168 g) of lean protein each day, such as lean meat, chicken, fish, eggs, or tofu. One ounce (oz) (28 g) of lean protein is equal to:  1 oz (28 g) of meat, chicken, or fish.  1 egg.  ¼ cup (62 g) of tofu.  Eat some foods each day that contain healthy fats, such as avocado, nuts, seeds, and fish.  What foods should I eat?  Fruits  Berries. Apples. Oranges. Peaches. Apricots. Plums. Grapes. Mangoes. Papayas. Pomegranates. Kiwi. Cherries.  Vegetables  Leafy greens, including lettuce, spinach, kale, chard, koffi greens, mustard greens, and cabbage. Beets. Cauliflower. Broccoli. Carrots. Green beans. Tomatoes. Peppers. Onions. Cucumbers. [place] sprouts.  Grains  Whole grains, such as whole-wheat or whole-grain bread, crackers, tortillas, cereal, and pasta. Unsweetened oatmeal. Quinoa. Brown or wild rice.  Meats and other proteins  Seafood. Poultry without skin. Lean cuts of poultry and beef. Tofu. Nuts. Seeds.  Dairy  Low-fat or fat-free dairy products such as milk, yogurt, and cheese.  The items listed above may not be a complete list of foods and beverages you can eat and drink. Contact a dietitian for more information.  What foods should I avoid?  Fruits  Fruits canned with syrup.  Vegetables  Canned vegetables. Frozen vegetables with butter or cream sauce.  Grains  Refined white flour and flour products such as bread, pasta, snack foods, and cereals. Avoid all processed foods.  Meats and other proteins  Fatty cuts of meat. Poultry with skin. Breaded or fried meats. Processed meat. Avoid saturated fats.  Dairy  Full-fat yogurt, cheese, or milk.  Beverages  Sweetened drinks, such as soda or iced tea.  The items listed above may not be a complete list of foods and beverages you should avoid. Contact a dietitian for more information.  Questions to ask a health care provider  Do I need  "to meet with a certified diabetes care and ?  Do I need to meet with a dietitian?  What number can I call if I have questions?  When are the best times to check my blood glucose?  Where to find more information:  American Diabetes Association: diabetes.org  Academy of Nutrition and Dietetics: eatright.org  National [place] of Diabetes and Digestive and Kidney Diseases: niddk.nih.gov  Association of Diabetes Care & Education Specialists: diabeteseducator.org  Summary  It is important to have healthy eating habits because your blood sugar (glucose) levels are greatly affected by what you eat and drink. It is important to use alcohol carefully.  A healthy meal plan will help you manage your blood glucose and lower your risk of heart disease.  Your health care provider may recommend that you work with a dietitian to make a meal plan that is best for you.  This information is not intended to replace advice given to you by your health care provider. Make sure you discuss any questions you have with your health care provider.  Document Revised: 07/21/2021 Document Reviewed: 07/21/2021  [employer] Patient Education © 2023 [employer]  Hypertension, Adult  High blood pressure (hypertension) is when the force of blood pumping through the arteries is too strong. The arteries are the blood vessels that carry blood from the heart throughout the body. Hypertension forces the heart to work harder to pump blood and may cause arteries to become narrow or stiff. Untreated or uncontrolled hypertension can lead to a heart attack, heart failure, a stroke, kidney disease, and other problems.  A blood pressure reading consists of a higher number over a lower number. Ideally, your blood pressure should be below 120/80. The first (\"top\") number is called the systolic pressure. It is a measure of the pressure in your arteries as your heart beats. The second (\"bottom\") number is called the diastolic pressure. It is a " measure of the pressure in your arteries as the heart relaxes.  What are the causes?  The exact cause of this condition is not known. There are some conditions that result in high blood pressure.  What increases the risk?  Certain factors may make you more likely to develop high blood pressure. Some of these risk factors are under your control, including:  Smoking.  Not getting enough exercise or physical activity.  Being overweight.  Having too much fat, sugar, calories, or salt (sodium) in your diet.  Drinking too much alcohol.  Other risk factors include:  Having a personal history of heart disease, diabetes, high cholesterol, or kidney disease.  Stress.  Having a family history of high blood pressure and high cholesterol.  Having obstructive sleep apnea.  Age. The risk increases with age.  What are the signs or symptoms?  High blood pressure may not cause symptoms. Very high blood pressure (hypertensive crisis) may cause:  Headache.  Fast or irregular heartbeats (palpitations).  Shortness of breath.  Nosebleed.  Nausea and vomiting.  Vision changes.  Severe chest pain, dizziness, and seizures.  How is this diagnosed?  This condition is diagnosed by measuring your blood pressure while you are seated, with your arm resting on a flat surface, your legs uncrossed, and your feet flat on the floor. The cuff of the blood pressure monitor will be placed directly against the skin of your upper arm at the level of your heart. Blood pressure should be measured at least twice using the same arm. Certain conditions can cause a difference in blood pressure between your right and left arms.  If you have a high blood pressure reading during one visit or you have normal blood pressure with other risk factors, you may be asked to:  Return on a different day to have your blood pressure checked again.  Monitor your blood pressure at home for 1 week or longer.  If you are diagnosed with hypertension, you may have other blood or  imaging tests to help your health care provider understand your overall risk for other conditions.  How is this treated?  This condition is treated by making healthy lifestyle changes, such as eating healthy foods, exercising more, and reducing your alcohol intake. You may be referred for counseling on a healthy diet and physical activity.  Your health care provider may prescribe medicine if lifestyle changes are not enough to get your blood pressure under control and if:  Your systolic blood pressure is above 130.  Your diastolic blood pressure is above 80.  Your personal target blood pressure may vary depending on your medical conditions, your age, and other factors.  Follow these instructions at home:  Eating and drinking    Eat a diet that is high in fiber and potassium, and low in sodium, added sugar, and fat. An example of this eating plan is called the DASH diet. DASH stands for Dietary Approaches to Stop Hypertension. To eat this way:  Eat plenty of fresh fruits and vegetables. Try to fill one half of your plate at each meal with fruits and vegetables.  Eat whole grains, such as whole-wheat pasta, brown rice, or whole-grain bread. Fill about one fourth of your plate with whole grains.  Eat or drink low-fat dairy products, such as skim milk or low-fat yogurt.  Avoid fatty cuts of meat, processed or cured meats, and poultry with skin. Fill about one fourth of your plate with lean proteins, such as fish, chicken without skin, beans, eggs, or tofu.  Avoid pre-made and processed foods. These tend to be higher in sodium, added sugar, and fat.  Reduce your daily sodium intake. Many people with hypertension should eat less than 1,500 mg of sodium a day.  Do not drink alcohol if:  Your health care provider tells you not to drink.  You are pregnant, may be pregnant, or are planning to become pregnant.  If you drink alcohol:  Limit how much you have to:  0-1 drink a day for women.  0-2 drinks a day for men.  Know how  much alcohol is in your drink. In the [place] one drink equals one 12 oz bottle of beer (355 mL), one 5 oz glass of wine (148 mL), or one 1½ oz glass of hard liquor (44 mL).  Lifestyle    Work with your health care provider to maintain a healthy body weight or to lose weight. Ask what an ideal weight is for you.  Get at least 30 minutes of exercise that causes your heart to beat faster (aerobic exercise) most days of the week. Activities may include walking, swimming, or biking.  Include exercise to strengthen your muscles (resistance exercise), such as Pilates or lifting weights, as part of your weekly exercise routine. Try to do these types of exercises for 30 minutes at least 3 days a week.  Do not use any products that contain nicotine or tobacco. These products include cigarettes, chewing tobacco, and vaping devices, such as e-cigarettes. If you need help quitting, ask your health care provider.  Monitor your blood pressure at home as told by your health care provider.  Keep all follow-up visits. This is important.  Medicines  Take over-the-counter and prescription medicines only as told by your health care provider. Follow directions carefully. Blood pressure medicines must be taken as prescribed.  Do not skip doses of blood pressure medicine. Doing this puts you at risk for problems and can make the medicine less effective.  Ask your health care provider about side effects or reactions to medicines that you should watch for.  Contact a health care provider if you:  Think you are having a reaction to a medicine you are taking.  Have headaches that keep coming back (recurring).  Feel dizzy.  Have swelling in your ankles.  Have trouble with your vision.  Get help right away if you:  Develop a severe headache or confusion.  Have unusual weakness or numbness.  Feel faint.  Have severe pain in your chest or abdomen.  Vomit repeatedly.  Have trouble breathing.  These symptoms may be an emergency. Get help right away.  Call 911.  Do not wait to see if the symptoms will go away.  Do not drive yourself to the hospital.  Summary  Hypertension is when the force of blood pumping through your arteries is too strong. If this condition is not controlled, it may put you at risk for serious complications.  Your personal target blood pressure may vary depending on your medical conditions, your age, and other factors. For most people, a normal blood pressure is less than 120/80.  Hypertension is treated with lifestyle changes, medicines, or a combination of both. Lifestyle changes include losing weight, eating a healthy, low-sodium diet, exercising more, and limiting alcohol.  This information is not intended to replace advice given to you by your health care provider. Make sure you discuss any questions you have with your health care provider.  Document Revised: 10/25/2022 Document Reviewed: 10/25/2022  [employer] Patient Education © 2023 [employer]

## 2024-01-15 NOTE — ASSESSMENT & PLAN NOTE
Suspect the left arm pain is likely secondary to sleeping position as it resolves quickly upon awakening, only occurs when she is sleeping, and has no associated symptoms.  Patient was counseled to work on sleeping in a neutral position.  RTC/ED precautions given.

## 2024-07-25 ENCOUNTER — TRANSCRIBE ORDERS (OUTPATIENT)
Dept: ADMINISTRATIVE | Facility: HOSPITAL | Age: 74
End: 2024-07-25
Payer: MEDICARE

## 2024-07-25 DIAGNOSIS — Z12.31 BREAST CANCER SCREENING BY MAMMOGRAM: Primary | ICD-10-CM

## 2024-09-16 ENCOUNTER — HOSPITAL ENCOUNTER (OUTPATIENT)
Dept: MAMMOGRAPHY | Facility: HOSPITAL | Age: 74
Discharge: HOME | End: 2024-09-16
Admitting: FAMILY MEDICINE
Payer: MEDICARE

## 2024-09-16 DIAGNOSIS — Z12.31 BREAST CANCER SCREENING BY MAMMOGRAM: ICD-10-CM

## 2024-09-16 PROCEDURE — 77063 BREAST TOMOSYNTHESIS BI: CPT

## 2024-09-16 PROCEDURE — 77067 SCR MAMMO BI INCL CAD: CPT

## 2024-09-23 ENCOUNTER — OFFICE VISIT (OUTPATIENT)
Dept: FAMILY MEDICINE CLINIC | Facility: CLINIC | Age: 74
End: 2024-09-23
Payer: MEDICARE

## 2024-09-23 VITALS
DIASTOLIC BLOOD PRESSURE: 66 MMHG | HEART RATE: 62 BPM | WEIGHT: 177.4 LBS | SYSTOLIC BLOOD PRESSURE: 118 MMHG | OXYGEN SATURATION: 98 % | TEMPERATURE: 98.1 F | HEIGHT: 60 IN | BODY MASS INDEX: 34.83 KG/M2

## 2024-09-23 DIAGNOSIS — R41.3 MEMORY LOSS: ICD-10-CM

## 2024-09-23 DIAGNOSIS — E78.2 MIXED HYPERLIPIDEMIA: ICD-10-CM

## 2024-09-23 DIAGNOSIS — E11.9 TYPE 2 DIABETES MELLITUS WITHOUT COMPLICATION, WITHOUT LONG-TERM CURRENT USE OF INSULIN: ICD-10-CM

## 2024-09-23 DIAGNOSIS — I10 PRIMARY HYPERTENSION: Primary | ICD-10-CM

## 2024-09-23 DIAGNOSIS — F32.A ANXIETY AND DEPRESSION: ICD-10-CM

## 2024-09-23 DIAGNOSIS — F41.9 ANXIETY AND DEPRESSION: ICD-10-CM

## 2024-09-23 PROCEDURE — G2211 COMPLEX E/M VISIT ADD ON: HCPCS | Performed by: FAMILY MEDICINE

## 2024-09-23 PROCEDURE — 1159F MED LIST DOCD IN RCRD: CPT | Performed by: FAMILY MEDICINE

## 2024-09-23 PROCEDURE — 1160F RVW MEDS BY RX/DR IN RCRD: CPT | Performed by: FAMILY MEDICINE

## 2024-09-23 PROCEDURE — 3074F SYST BP LT 130 MM HG: CPT | Performed by: FAMILY MEDICINE

## 2024-09-23 PROCEDURE — 99214 OFFICE O/P EST MOD 30 MIN: CPT | Performed by: FAMILY MEDICINE

## 2024-09-23 PROCEDURE — 3078F DIAST BP <80 MM HG: CPT | Performed by: FAMILY MEDICINE

## 2024-09-23 PROCEDURE — 1126F AMNT PAIN NOTED NONE PRSNT: CPT | Performed by: FAMILY MEDICINE

## 2024-09-23 RX ORDER — CITALOPRAM HYDROBROMIDE 10 MG/1
10 TABLET ORAL DAILY
Qty: 30 TABLET | Refills: 3 | Status: SHIPPED | OUTPATIENT
Start: 2024-09-23

## 2024-10-04 ENCOUNTER — LAB (OUTPATIENT)
Dept: LAB | Facility: HOSPITAL | Age: 74
End: 2024-10-04
Payer: MEDICARE

## 2024-10-04 DIAGNOSIS — I10 PRIMARY HYPERTENSION: ICD-10-CM

## 2024-10-04 DIAGNOSIS — R41.3 MEMORY LOSS: ICD-10-CM

## 2024-10-04 DIAGNOSIS — E11.9 TYPE 2 DIABETES MELLITUS WITHOUT COMPLICATION, WITHOUT LONG-TERM CURRENT USE OF INSULIN: ICD-10-CM

## 2024-10-04 DIAGNOSIS — E78.2 MIXED HYPERLIPIDEMIA: ICD-10-CM

## 2024-10-04 LAB
ALBUMIN SERPL-MCNC: 3.7 G/DL (ref 3.5–5.2)
ALBUMIN UR-MCNC: <1.2 MG/DL
ALBUMIN/GLOB SERPL: 1.4 G/DL
ALP SERPL-CCNC: 76 U/L (ref 39–117)
ALT SERPL W P-5'-P-CCNC: 16 U/L (ref 1–33)
ANION GAP SERPL CALCULATED.3IONS-SCNC: 11.3 MMOL/L (ref 5–15)
AST SERPL-CCNC: 22 U/L (ref 1–32)
BASOPHILS # BLD AUTO: 0.04 10*3/MM3 (ref 0–0.2)
BASOPHILS NFR BLD AUTO: 0.5 % (ref 0–1.5)
BILIRUB SERPL-MCNC: 0.6 MG/DL (ref 0–1.2)
BUN SERPL-MCNC: 18 MG/DL (ref 8–23)
BUN/CREAT SERPL: 18.8 (ref 7–25)
CALCIUM SPEC-SCNC: 10.1 MG/DL (ref 8.6–10.5)
CHLORIDE SERPL-SCNC: 106 MMOL/L (ref 98–107)
CHOLEST SERPL-MCNC: 155 MG/DL (ref 0–200)
CO2 SERPL-SCNC: 21.7 MMOL/L (ref 22–29)
CREAT SERPL-MCNC: 0.96 MG/DL (ref 0.57–1)
CREAT UR-MCNC: 163.2 MG/DL
DEPRECATED RDW RBC AUTO: 43.3 FL (ref 37–54)
EGFRCR SERPLBLD CKD-EPI 2021: 61.4 ML/MIN/1.73
EOSINOPHIL # BLD AUTO: 0.13 10*3/MM3 (ref 0–0.4)
EOSINOPHIL NFR BLD AUTO: 1.8 % (ref 0.3–6.2)
ERYTHROCYTE [DISTWIDTH] IN BLOOD BY AUTOMATED COUNT: 13 % (ref 12.3–15.4)
GLOBULIN UR ELPH-MCNC: 2.6 GM/DL
GLUCOSE SERPL-MCNC: 93 MG/DL (ref 65–99)
HBA1C MFR BLD: 6 % (ref 4.8–5.6)
HCT VFR BLD AUTO: 43.3 % (ref 34–46.6)
HDLC SERPL-MCNC: 54 MG/DL (ref 40–60)
HGB BLD-MCNC: 14.9 G/DL (ref 12–15.9)
IMM GRANULOCYTES # BLD AUTO: 0.05 10*3/MM3 (ref 0–0.05)
IMM GRANULOCYTES NFR BLD AUTO: 0.7 % (ref 0–0.5)
LDLC SERPL CALC-MCNC: 85 MG/DL (ref 0–100)
LDLC/HDLC SERPL: 1.56 {RATIO}
LYMPHOCYTES # BLD AUTO: 1.41 10*3/MM3 (ref 0.7–3.1)
LYMPHOCYTES NFR BLD AUTO: 19.2 % (ref 19.6–45.3)
MCH RBC QN AUTO: 31.6 PG (ref 26.6–33)
MCHC RBC AUTO-ENTMCNC: 34.4 G/DL (ref 31.5–35.7)
MCV RBC AUTO: 91.9 FL (ref 79–97)
MICROALBUMIN/CREAT UR: NORMAL MG/G{CREAT}
MONOCYTES # BLD AUTO: 0.69 10*3/MM3 (ref 0.1–0.9)
MONOCYTES NFR BLD AUTO: 9.4 % (ref 5–12)
NEUTROPHILS NFR BLD AUTO: 5.03 10*3/MM3 (ref 1.7–7)
NEUTROPHILS NFR BLD AUTO: 68.4 % (ref 42.7–76)
NRBC BLD AUTO-RTO: 0 /100 WBC (ref 0–0.2)
PLATELET # BLD AUTO: 240 10*3/MM3 (ref 140–450)
PMV BLD AUTO: 11.8 FL (ref 6–12)
POTASSIUM SERPL-SCNC: 5.3 MMOL/L (ref 3.5–5.2)
PROT SERPL-MCNC: 6.3 G/DL (ref 6–8.5)
RBC # BLD AUTO: 4.71 10*6/MM3 (ref 3.77–5.28)
SODIUM SERPL-SCNC: 139 MMOL/L (ref 136–145)
TRIGL SERPL-MCNC: 84 MG/DL (ref 0–150)
TSH SERPL DL<=0.05 MIU/L-ACNC: 2.85 UIU/ML (ref 0.27–4.2)
VLDLC SERPL-MCNC: 16 MG/DL (ref 5–40)
WBC NRBC COR # BLD AUTO: 7.35 10*3/MM3 (ref 3.4–10.8)

## 2024-10-04 PROCEDURE — 83036 HEMOGLOBIN GLYCOSYLATED A1C: CPT

## 2024-10-04 PROCEDURE — 80053 COMPREHEN METABOLIC PANEL: CPT

## 2024-10-04 PROCEDURE — 85025 COMPLETE CBC W/AUTO DIFF WBC: CPT

## 2024-10-04 PROCEDURE — 84443 ASSAY THYROID STIM HORMONE: CPT

## 2024-10-04 PROCEDURE — 82043 UR ALBUMIN QUANTITATIVE: CPT

## 2024-10-04 PROCEDURE — 80061 LIPID PANEL: CPT

## 2024-10-04 PROCEDURE — 82570 ASSAY OF URINE CREATININE: CPT

## 2024-10-22 ENCOUNTER — TELEPHONE (OUTPATIENT)
Dept: FAMILY MEDICINE CLINIC | Facility: CLINIC | Age: 74
End: 2024-10-22

## 2024-10-22 NOTE — TELEPHONE ENCOUNTER
----- Message from Stoney Diggs sent at 10/18/2024  8:43 AM EDT -----  Please let the patient know that labs that were drawn at previous visit were stable. Patient should continue current treatment plan.  If patient has any questions they can contact me.

## 2024-12-18 ENCOUNTER — TELEPHONE (OUTPATIENT)
Dept: FAMILY MEDICINE CLINIC | Facility: CLINIC | Age: 74
End: 2024-12-18

## 2024-12-18 ENCOUNTER — TELEPHONE (OUTPATIENT)
Dept: NEUROLOGY | Facility: CLINIC | Age: 74
End: 2024-12-18
Payer: MEDICARE

## 2024-12-18 NOTE — TELEPHONE ENCOUNTER
Provider: BROOKE    Caller: Holly Rosas    Relationship to Patient: Emergency Contact    Phone Number: [phone]    Reason for Call: PT HAS AN NEW PT APPT ON 12/31/24 AND DAUGHTER IS CALLING BECAUSE SHE HAS SOME CONCERNS THAT SHE WANTED TO LET THE PROVIDER KNOW BEFORE THE APPT.  SHE WILL BE AT THE APPT WITH HER MOTHER.      PT IS DOING THINGS AND NOT REMEMBERING DOING THEM.  SHE IS DOING THINGS OUT OF CHARACTER AND DOESN'T REMEMBER IT.   SHE GETS AGITATED FREQUENTLY.  DAUGHTER STATES HER MOTHER WILL DENY HAVING A PROBLEM.  FOR EXAMPLE, SHE NEEDS TO WEAR HEARING AIDS, BUT SHE SAYS SHE DOES NOT HAVE A PROBLEM WITH HEARING.  SHE WOULD LIKE A CALL FROM SOMEONE TO GO OVER SOME OF HER CONCERNS BEFORE THE APPT BECAUSE SHE DOES NOT WANT TO DISCUSS THIS IN FRONT OF HER MOTHER.      PLEASE REVIEW AND ADVISE     THANK YOU

## 2024-12-18 NOTE — TELEPHONE ENCOUNTER
Caller: Holly Rosas     Relationship: DAUGHTER    Best call back number: [phone]     What is your medical concern? PRIVATE CONCERNS FROM DAUGHTER    Is your provider already aware of this issue? UNCERTAIN    PLEASE CALL NUMBER PROVIDED TO DISCUSS BEFORE APPOINTMENT 12.19.24 AT 8:30 AM

## 2024-12-18 NOTE — TELEPHONE ENCOUNTER
Called and spoke with daughter. She said you guys previously in September discussed memory issues and this upsets the patient to talk about it. Daughter said she has had a couple moments where she did not recognize daughter and was going to call the  on her. Daughter said she is sure with her wellness there is a touch on memory. Patient's father did have alzheimers. Daughter just wants to express concerns as she knows there is something wrong but patient denies memory issues. She said sometimes she does things and does not remember the next day. She said daughter is upset with her because of the neurology consult but it is needed. She said if provider could remind patient of importance of referral she would appreciate it. She said patient does have frequent agitation episodes but does not remember them the next day. She also wants to know if its possible this could be related to a UTI.

## 2024-12-19 ENCOUNTER — OFFICE VISIT (OUTPATIENT)
Dept: FAMILY MEDICINE CLINIC | Facility: CLINIC | Age: 74
End: 2024-12-19
Payer: MEDICARE

## 2024-12-19 ENCOUNTER — LAB (OUTPATIENT)
Dept: LAB | Facility: HOSPITAL | Age: 74
End: 2024-12-19
Payer: MEDICARE

## 2024-12-19 VITALS
DIASTOLIC BLOOD PRESSURE: 68 MMHG | WEIGHT: 178.6 LBS | TEMPERATURE: 98.1 F | HEART RATE: 59 BPM | HEIGHT: 60 IN | SYSTOLIC BLOOD PRESSURE: 116 MMHG | OXYGEN SATURATION: 99 % | BODY MASS INDEX: 35.06 KG/M2

## 2024-12-19 DIAGNOSIS — E55.9 VITAMIN D DEFICIENCY: ICD-10-CM

## 2024-12-19 DIAGNOSIS — Z00.00 MEDICARE ANNUAL WELLNESS VISIT, SUBSEQUENT: Primary | ICD-10-CM

## 2024-12-19 DIAGNOSIS — L84 CALLUS OF FOOT: ICD-10-CM

## 2024-12-19 DIAGNOSIS — E11.9 TYPE 2 DIABETES MELLITUS WITHOUT COMPLICATION, WITHOUT LONG-TERM CURRENT USE OF INSULIN: ICD-10-CM

## 2024-12-19 DIAGNOSIS — I10 PRIMARY HYPERTENSION: ICD-10-CM

## 2024-12-19 DIAGNOSIS — Z00.00 MEDICARE ANNUAL WELLNESS VISIT, SUBSEQUENT: ICD-10-CM

## 2024-12-19 DIAGNOSIS — R41.3 MEMORY LOSS: ICD-10-CM

## 2024-12-19 LAB
25(OH)D3 SERPL-MCNC: 96.3 NG/ML (ref 30–100)
ALBUMIN SERPL-MCNC: 3.6 G/DL (ref 3.5–5.2)
ALBUMIN/GLOB SERPL: 1.2 G/DL
ALP SERPL-CCNC: 87 U/L (ref 39–117)
ALT SERPL W P-5'-P-CCNC: 12 U/L (ref 1–33)
ANION GAP SERPL CALCULATED.3IONS-SCNC: 10.8 MMOL/L (ref 5–15)
AST SERPL-CCNC: 21 U/L (ref 1–32)
BACTERIA UR QL AUTO: ABNORMAL /HPF
BASOPHILS # BLD AUTO: 0.04 10*3/MM3 (ref 0–0.2)
BASOPHILS NFR BLD AUTO: 0.5 % (ref 0–1.5)
BILIRUB SERPL-MCNC: 0.3 MG/DL (ref 0–1.2)
BILIRUB UR QL STRIP: NEGATIVE
BUN SERPL-MCNC: 19 MG/DL (ref 8–23)
BUN/CREAT SERPL: 20.9 (ref 7–25)
CALCIUM SPEC-SCNC: 9.7 MG/DL (ref 8.6–10.5)
CHLORIDE SERPL-SCNC: 106 MMOL/L (ref 98–107)
CLARITY UR: CLEAR
CO2 SERPL-SCNC: 24.2 MMOL/L (ref 22–29)
COLOR UR: YELLOW
CREAT SERPL-MCNC: 0.91 MG/DL (ref 0.57–1)
DEPRECATED RDW RBC AUTO: 44.1 FL (ref 37–54)
EGFRCR SERPLBLD CKD-EPI 2021: 65.5 ML/MIN/1.73
EOSINOPHIL # BLD AUTO: 0.24 10*3/MM3 (ref 0–0.4)
EOSINOPHIL NFR BLD AUTO: 3.3 % (ref 0.3–6.2)
ERYTHROCYTE [DISTWIDTH] IN BLOOD BY AUTOMATED COUNT: 12.6 % (ref 12.3–15.4)
GLOBULIN UR ELPH-MCNC: 2.9 GM/DL
GLUCOSE SERPL-MCNC: 93 MG/DL (ref 65–99)
GLUCOSE UR STRIP-MCNC: NEGATIVE MG/DL
HCT VFR BLD AUTO: 42.9 % (ref 34–46.6)
HGB BLD-MCNC: 14.3 G/DL (ref 12–15.9)
HGB UR QL STRIP.AUTO: NEGATIVE
HOLD SPECIMEN: NORMAL
HYALINE CASTS UR QL AUTO: ABNORMAL /LPF
IMM GRANULOCYTES # BLD AUTO: 0.03 10*3/MM3 (ref 0–0.05)
IMM GRANULOCYTES NFR BLD AUTO: 0.4 % (ref 0–0.5)
KETONES UR QL STRIP: NEGATIVE
LEUKOCYTE ESTERASE UR QL STRIP.AUTO: ABNORMAL
LYMPHOCYTES # BLD AUTO: 1.81 10*3/MM3 (ref 0.7–3.1)
LYMPHOCYTES NFR BLD AUTO: 24.8 % (ref 19.6–45.3)
MCH RBC QN AUTO: 31.7 PG (ref 26.6–33)
MCHC RBC AUTO-ENTMCNC: 33.3 G/DL (ref 31.5–35.7)
MCV RBC AUTO: 95.1 FL (ref 79–97)
MONOCYTES # BLD AUTO: 0.69 10*3/MM3 (ref 0.1–0.9)
MONOCYTES NFR BLD AUTO: 9.5 % (ref 5–12)
NEUTROPHILS NFR BLD AUTO: 4.48 10*3/MM3 (ref 1.7–7)
NEUTROPHILS NFR BLD AUTO: 61.5 % (ref 42.7–76)
NITRITE UR QL STRIP: POSITIVE
NRBC BLD AUTO-RTO: 0 /100 WBC (ref 0–0.2)
PH UR STRIP.AUTO: 6.5 [PH] (ref 5–8)
PLATELET # BLD AUTO: 258 10*3/MM3 (ref 140–450)
PMV BLD AUTO: 12.1 FL (ref 6–12)
POTASSIUM SERPL-SCNC: 4.6 MMOL/L (ref 3.5–5.2)
PROT SERPL-MCNC: 6.5 G/DL (ref 6–8.5)
PROT UR QL STRIP: NEGATIVE
RBC # BLD AUTO: 4.51 10*6/MM3 (ref 3.77–5.28)
RBC # UR STRIP: ABNORMAL /HPF
REF LAB TEST METHOD: ABNORMAL
SODIUM SERPL-SCNC: 141 MMOL/L (ref 136–145)
SP GR UR STRIP: 1.01 (ref 1–1.03)
SQUAMOUS #/AREA URNS HPF: ABNORMAL /HPF
UROBILINOGEN UR QL STRIP: ABNORMAL
WBC # UR STRIP: ABNORMAL /HPF
WBC NRBC COR # BLD AUTO: 7.29 10*3/MM3 (ref 3.4–10.8)

## 2024-12-19 PROCEDURE — 81001 URINALYSIS AUTO W/SCOPE: CPT

## 2024-12-19 PROCEDURE — 80053 COMPREHEN METABOLIC PANEL: CPT

## 2024-12-19 PROCEDURE — 87077 CULTURE AEROBIC IDENTIFY: CPT

## 2024-12-19 PROCEDURE — 87186 SC STD MICRODIL/AGAR DIL: CPT

## 2024-12-19 PROCEDURE — 85025 COMPLETE CBC W/AUTO DIFF WBC: CPT

## 2024-12-19 PROCEDURE — 87086 URINE CULTURE/COLONY COUNT: CPT

## 2024-12-19 PROCEDURE — 82306 VITAMIN D 25 HYDROXY: CPT

## 2024-12-19 RX ORDER — LANOLIN ALCOHOL/MO/W.PET/CERES
1000 CREAM (GRAM) TOPICAL DAILY
COMMUNITY

## 2024-12-19 NOTE — PATIENT INSTRUCTIONS
Diabetes Mellitus and Nutrition, Adult  When you have diabetes, or diabetes mellitus, it is very important to have healthy eating habits because your blood sugar (glucose) levels are greatly affected by what you eat and drink. Eating healthy foods in the right amounts, at about the same times every day, can help you:  Manage your blood glucose.  Lower your risk of heart disease.  Improve your blood pressure.  Reach or maintain a healthy weight.  What can affect my meal plan?  Every person with diabetes is different, and each person has different needs for a meal plan. Your health care provider may recommend that you work with a dietitian to make a meal plan that is best for you. Your meal plan may vary depending on factors such as:  The calories you need.  The medicines you take.  Your weight.  Your blood glucose, blood pressure, and cholesterol levels.  Your activity level.  Other health conditions you have, such as heart or kidney disease.  How do carbohydrates affect me?  Carbohydrates, also called carbs, affect your blood glucose level more than any other type of food. Eating carbs raises the amount of glucose in your blood.  It is important to know how many carbs you can safely have in each meal. This is different for every person. Your dietitian can help you calculate how many carbs you should have at each meal and for each snack.  How does alcohol affect me?  Alcohol can cause a decrease in blood glucose (hypoglycemia), especially if you use insulin or take certain diabetes medicines by mouth. Hypoglycemia can be a life-threatening condition. Symptoms of hypoglycemia, such as sleepiness, dizziness, and confusion, are similar to symptoms of having too much alcohol.  Do not drink alcohol if:  Your health care provider tells you not to drink.  You are pregnant, may be pregnant, or are planning to become pregnant.  If you drink alcohol:  Limit how much you have to:  0-1 drink a day for women.  0-2 drinks a day  "for men.  Know how much alcohol is in your drink. In the [place] one drink equals one 12 oz bottle of beer (355 mL), one 5 oz glass of wine (148 mL), or one 1½ oz glass of hard liquor (44 mL).  Keep yourself hydrated with water, diet soda, or unsweetened iced tea. Keep in mind that regular soda, juice, and other mixers may contain a lot of sugar and must be counted as carbs.  What are tips for following this plan?    Reading food labels  Start by checking the serving size on the Nutrition Facts label of packaged foods and drinks. The number of calories and the amount of carbs, fats, and other nutrients listed on the label are based on one serving of the item. Many items contain more than one serving per package.  Check the total grams (g) of carbs in one serving.  Check the number of grams of saturated fats and trans fats in one serving. Choose foods that have a low amount or none of these fats.  Check the number of milligrams (mg) of salt (sodium) in one serving. Most people should limit total sodium intake to less than 2,300 mg per day.  Always check the nutrition information of foods labeled as \"low-fat\" or \"nonfat.\" These foods may be higher in added sugar or refined carbs and should be avoided.  Talk to your dietitian to identify your daily goals for nutrients listed on the label.  Shopping  Avoid buying canned, pre-made, or processed foods. These foods tend to be high in fat, sodium, and added sugar.  Shop around the outside edge of the grocery store. This is where you will most often find fresh fruits and vegetables, bulk grains, fresh meats, and fresh dairy products.  Cooking  Use low-heat cooking methods, such as baking, instead of high-heat cooking methods, such as deep frying.  Cook using healthy oils, such as olive, canola, or sunflower oil.  Avoid cooking with butter, cream, or high-fat meats.  Meal planning  Eat meals and snacks regularly, preferably at the same times every day. Avoid going long periods " of time without eating.  Eat foods that are high in fiber, such as fresh fruits, vegetables, beans, and whole grains.  Eat 4-6 oz (112-168 g) of lean protein each day, such as lean meat, chicken, fish, eggs, or tofu. One ounce (oz) (28 g) of lean protein is equal to:  1 oz (28 g) of meat, chicken, or fish.  1 egg.  ¼ cup (62 g) of tofu.  Eat some foods each day that contain healthy fats, such as avocado, nuts, seeds, and fish.  What foods should I eat?  Fruits  Berries. Apples. Oranges. Peaches. Apricots. Plums. Grapes. Mangoes. Papayas. Pomegranates. Kiwi. Cherries.  Vegetables  Leafy greens, including lettuce, spinach, kale, chard, koffi greens, mustard greens, and cabbage. Beets. Cauliflower. Broccoli. Carrots. Green beans. Tomatoes. Peppers. Onions. Cucumbers. [place] sprouts.  Grains  Whole grains, such as whole-wheat or whole-grain bread, crackers, tortillas, cereal, and pasta. Unsweetened oatmeal. Quinoa. Brown or wild rice.  Meats and other proteins  Seafood. Poultry without skin. Lean cuts of poultry and beef. Tofu. Nuts. Seeds.  Dairy  Low-fat or fat-free dairy products such as milk, yogurt, and cheese.  The items listed above may not be a complete list of foods and beverages you can eat and drink. Contact a dietitian for more information.  What foods should I avoid?  Fruits  Fruits canned with syrup.  Vegetables  Canned vegetables. Frozen vegetables with butter or cream sauce.  Grains  Refined white flour and flour products such as bread, pasta, snack foods, and cereals. Avoid all processed foods.  Meats and other proteins  Fatty cuts of meat. Poultry with skin. Breaded or fried meats. Processed meat. Avoid saturated fats.  Dairy  Full-fat yogurt, cheese, or milk.  Beverages  Sweetened drinks, such as soda or iced tea.  The items listed above may not be a complete list of foods and beverages you should avoid. Contact a dietitian for more information.  Questions to ask a health care provider  Do I need  "to meet with a certified diabetes care and ?  Do I need to meet with a dietitian?  What number can I call if I have questions?  When are the best times to check my blood glucose?  Where to find more information:  American Diabetes Association: diabetes.org  Academy of Nutrition and Dietetics: eatright.org  National [place] of Diabetes and Digestive and Kidney Diseases: niddk.nih.gov  Association of Diabetes Care & Education Specialists: diabeteseducator.org  Summary  It is important to have healthy eating habits because your blood sugar (glucose) levels are greatly affected by what you eat and drink. It is important to use alcohol carefully.  A healthy meal plan will help you manage your blood glucose and lower your risk of heart disease.  Your health care provider may recommend that you work with a dietitian to make a meal plan that is best for you.  This information is not intended to replace advice given to you by your health care provider. Make sure you discuss any questions you have with your health care provider.  Document Revised: 07/21/2021 Document Reviewed: 07/21/2021  [employer] Patient Education © 2024 [employer]  Hypertension, Adult  High blood pressure (hypertension) is when the force of blood pumping through the arteries is too strong. The arteries are the blood vessels that carry blood from the heart throughout the body. Hypertension forces the heart to work harder to pump blood and may cause arteries to become narrow or stiff. Untreated or uncontrolled hypertension can lead to a heart attack, heart failure, a stroke, kidney disease, and other problems.  A blood pressure reading consists of a higher number over a lower number. Ideally, your blood pressure should be below 120/80. The first (\"top\") number is called the systolic pressure. It is a measure of the pressure in your arteries as your heart beats. The second (\"bottom\") number is called the diastolic pressure. It is a " measure of the pressure in your arteries as the heart relaxes.  What are the causes?  The exact cause of this condition is not known. There are some conditions that result in high blood pressure.  What increases the risk?  Certain factors may make you more likely to develop high blood pressure. Some of these risk factors are under your control, including:  Smoking.  Not getting enough exercise or physical activity.  Being overweight.  Having too much fat, sugar, calories, or salt (sodium) in your diet.  Drinking too much alcohol.  Other risk factors include:  Having a personal history of heart disease, diabetes, high cholesterol, or kidney disease.  Stress.  Having a family history of high blood pressure and high cholesterol.  Having obstructive sleep apnea.  Age. The risk increases with age.  What are the signs or symptoms?  High blood pressure may not cause symptoms. Very high blood pressure (hypertensive crisis) may cause:  Headache.  Fast or irregular heartbeats (palpitations).  Shortness of breath.  Nosebleed.  Nausea and vomiting.  Vision changes.  Severe chest pain, dizziness, and seizures.  How is this diagnosed?  This condition is diagnosed by measuring your blood pressure while you are seated, with your arm resting on a flat surface, your legs uncrossed, and your feet flat on the floor. The cuff of the blood pressure monitor will be placed directly against the skin of your upper arm at the level of your heart. Blood pressure should be measured at least twice using the same arm. Certain conditions can cause a difference in blood pressure between your right and left arms.  If you have a high blood pressure reading during one visit or you have normal blood pressure with other risk factors, you may be asked to:  Return on a different day to have your blood pressure checked again.  Monitor your blood pressure at home for 1 week or longer.  If you are diagnosed with hypertension, you may have other blood or  imaging tests to help your health care provider understand your overall risk for other conditions.  How is this treated?  This condition is treated by making healthy lifestyle changes, such as eating healthy foods, exercising more, and reducing your alcohol intake. You may be referred for counseling on a healthy diet and physical activity.  Your health care provider may prescribe medicine if lifestyle changes are not enough to get your blood pressure under control and if:  Your systolic blood pressure is above 130.  Your diastolic blood pressure is above 80.  Your personal target blood pressure may vary depending on your medical conditions, your age, and other factors.  Follow these instructions at home:  Eating and drinking    Eat a diet that is high in fiber and potassium, and low in sodium, added sugar, and fat. An example of this eating plan is called the DASH diet. DASH stands for Dietary Approaches to Stop Hypertension. To eat this way:  Eat plenty of fresh fruits and vegetables. Try to fill one half of your plate at each meal with fruits and vegetables.  Eat whole grains, such as whole-wheat pasta, brown rice, or whole-grain bread. Fill about one fourth of your plate with whole grains.  Eat or drink low-fat dairy products, such as skim milk or low-fat yogurt.  Avoid fatty cuts of meat, processed or cured meats, and poultry with skin. Fill about one fourth of your plate with lean proteins, such as fish, chicken without skin, beans, eggs, or tofu.  Avoid pre-made and processed foods. These tend to be higher in sodium, added sugar, and fat.  Reduce your daily sodium intake. Many people with hypertension should eat less than 1,500 mg of sodium a day.  Do not drink alcohol if:  Your health care provider tells you not to drink.  You are pregnant, may be pregnant, or are planning to become pregnant.  If you drink alcohol:  Limit how much you have to:  0-1 drink a day for women.  0-2 drinks a day for men.  Know how  much alcohol is in your drink. In the [place] one drink equals one 12 oz bottle of beer (355 mL), one 5 oz glass of wine (148 mL), or one 1½ oz glass of hard liquor (44 mL).  Lifestyle    Work with your health care provider to maintain a healthy body weight or to lose weight. Ask what an ideal weight is for you.  Get at least 30 minutes of exercise that causes your heart to beat faster (aerobic exercise) most days of the week. Activities may include walking, swimming, or biking.  Include exercise to strengthen your muscles (resistance exercise), such as Pilates or lifting weights, as part of your weekly exercise routine. Try to do these types of exercises for 30 minutes at least 3 days a week.  Do not use any products that contain nicotine or tobacco. These products include cigarettes, chewing tobacco, and vaping devices, such as e-cigarettes. If you need help quitting, ask your health care provider.  Monitor your blood pressure at home as told by your health care provider.  Keep all follow-up visits. This is important.  Medicines  Take over-the-counter and prescription medicines only as told by your health care provider. Follow directions carefully. Blood pressure medicines must be taken as prescribed.  Do not skip doses of blood pressure medicine. Doing this puts you at risk for problems and can make the medicine less effective.  Ask your health care provider about side effects or reactions to medicines that you should watch for.  Contact a health care provider if you:  Think you are having a reaction to a medicine you are taking.  Have headaches that keep coming back (recurring).  Feel dizzy.  Have swelling in your ankles.  Have trouble with your vision.  Get help right away if you:  Develop a severe headache or confusion.  Have unusual weakness or numbness.  Feel faint.  Have severe pain in your chest or abdomen.  Vomit repeatedly.  Have trouble breathing.  These symptoms may be an emergency. Get help right away.  Call 911.  Do not wait to see if the symptoms will go away.  Do not drive yourself to the hospital.  Summary  Hypertension is when the force of blood pumping through your arteries is too strong. If this condition is not controlled, it may put you at risk for serious complications.  Your personal target blood pressure may vary depending on your medical conditions, your age, and other factors. For most people, a normal blood pressure is less than 120/80.  Hypertension is treated with lifestyle changes, medicines, or a combination of both. Lifestyle changes include losing weight, eating a healthy, low-sodium diet, exercising more, and limiting alcohol.  This information is not intended to replace advice given to you by your health care provider. Make sure you discuss any questions you have with your health care provider.  Document Revised: 10/25/2022 Document Reviewed: 10/25/2022  [employer] Patient Education © 2024 [employer]    Advance Care Planning and Advance Directives     You make decisions on a daily basis - decisions about where you want to live, your career, your home, your life. Perhaps one of the most important decisions you face is your choice for future medical care. Take time to talk with your family and your healthcare team and start planning today.  Advance Care Planning is a process that can help you:  Understand possible future healthcare decisions in light of your own experiences  Reflect on those decision in light of your goals and values  Discuss your decisions with those closest to you and the healthcare professionals that care for you  Make a plan by creating a document that reflects your wishes    Surrogate Decision Maker  In the event of a medical emergency, which has left you unable to communicate or to make your own decisions, you would need someone to make decisions for you.  It is important to discuss your preferences for medical treatment with this person while you are in good health.      Qualities of a surrogate decision maker:  Willing to take on this role and responsibility  Knows what you want for future medical care  Willing to follow your wishes even if they don't agree with them  Able to make difficult medical decisions under stressful circumstances    Advance Directives  These are legal documents you can create that will guide your healthcare team and decision maker(s) when needed. These documents can be stored in the electronic medical record.    Living Will - a legal document to guide your care if you have a terminal condition or a serious illness and are unable to communicate. States vary by statute in document names/types, but most forms may include one or more of the following:        -  Directions regarding life-prolonging treatments        -  Directions regarding artificially provided nutrition/hydration        -  Choosing a healthcare decision maker        -  Direction regarding organ/tissue donation    Durable Power of  for Healthcare - this document names an -in-fact to make medical decisions for you, but it may also allow this person to make personal and financial decisions for you. Please seek the advice of an  if you need this type of document.    **Advance Directives are not required and no one may discriminate against you if you do not sign one.    Medical Orders  Many states allow specific forms/orders signed by your physician to record your wishes for medical treatment in your current state of health. This form, signed in personal communication with your physician, addresses resuscitation and other medical interventions that you may or may not want.      For more information or to schedule a time with a [facility] Advance Care Planning Facilitator contact: [email or web address] or call [phone] and someone will contact you directly.Advance Directive    Advance directives are legal documents that allow you to make decisions about your health  care and medical treatment in case you become unable to communicate for yourself. Advance directives let your wishes be known to family, friends, and health care providers.  Discussing and writing advance directives should happen over time rather than all at once. Advance directives can be changed and updated at any time. There are different types of advance directives, such as:  Medical power of .  Living will.  Do not resuscitate (DNR) order or do not attempt resuscitation (DNAR) order.  Health care proxy and medical power of   A health care proxy is also called a health care agent. This person is appointed to make medical decisions for you when you are unable to make decisions for yourself. Generally, people ask a trusted friend or family member to act as their proxy and represent their preferences. Make sure you have an agreement with your trusted person to act as your proxy. A proxy may have to make a medical decision on your behalf if your wishes are not known.  A medical power of , also called a durable power of  for health care, is a legal document that names your health care proxy. Depending on the laws in your state, the document may need to be:  Signed.  Notarized.  Dated.  Copied.  Witnessed.  Incorporated into your medical record.  You may also want to appoint a trusted person to manage your money in the event you are unable to do so. This is called a durable power of  for finances. It is a separate legal document from the durable power of  for health care. You may choose your health care proxy or someone different to act as your agent in money matters.  If you do not appoint a proxy, or there is a concern that the proxy is not acting in your best interest, a court may appoint a guardian to act on your behalf.  Living will  A living will is a set of instructions that state your wishes about medical care when you cannot express them yourself. Health care  providers should keep a copy of your living will in your medical record. You may want to give a copy to family members or friends. To alert caregivers in case of an emergency, you can place a card in your wallet to let them know that you have a living will and where they can find it. A living will is used if you become:  Terminally ill.  Disabled.  Unable to communicate or make decisions.  The following decisions should be included in your living will:  To use or not to use life support equipment, such as dialysis machines and breathing machines (ventilators).  Whether you want a DNR or DNAR order. This tells health care providers not to use cardiopulmonary resuscitation (CPR) if breathing or heartbeat stops.  To use or not to use tube feeding.  To be given or not to be given food and fluids.  Whether you want comfort (palliative) care when the goal becomes comfort rather than a cure.  Whether you want to donate your organs and tissues.  A living will does not give instructions for distributing your money and property if you should pass away.  DNR or DNAR  A DNR or DNAR order is a request not to have CPR in the event that your heart stops beating or you stop breathing. If a DNR or DNAR order has not been made and shared, a health care provider will try to help any patient whose heart has stopped or who has stopped breathing. If you plan to have surgery, talk with your health care provider about how your DNR or DNAR order will be followed if problems occur.  What if I do not have an advance directive?  Some states assign family decision makers to act on your behalf if you do not have an advance directive. Each state has its own laws about advance directives. You may want to check with your health care provider, , or state representative about the laws in your state.  Summary  Advance directives are legal documents that allow you to make decisions about your health care and medical treatment in case you become  unable to communicate for yourself.  The process of discussing and writing advance directives should happen over time. You can change and update advance directives at any time.  Advance directives may include a medical power of , a living will, and a DNR or DNAR order.  This information is not intended to replace advice given to you by your health care provider. Make sure you discuss any questions you have with your health care provider.  Document Revised: 2021 Document Reviewed: 2021  [employer] Patient Education ©  [employer]    Medicare Wellness  Personal Prevention Plan of Service     Date of Office Visit:    Encounter Provider:  Stoney Diggs MD  Place of Service:  [facility]  Patient Name: Keisha Padilla  :  1950    As part of the Medicare Wellness portion of your visit today, we are providing you with this personalized preventive plan of services (PPPS). This plan is based upon recommendations of the [place] Preventive Services Task Force (USPSTF) and the Advisory Committee on Immunization Practices (ACIP).    This lists the preventive care services that should be considered, and provides dates of when you are due. Items listed as completed are up-to-date and do not require any further intervention.    Health Maintenance   Topic Date Due    DXA SCAN  2021    COVID-19 Vaccine (2024- season) 03/10/2025 (Originally 2024)    HEMOGLOBIN A1C  2025    DIABETIC EYE EXAM  2025    BMI FOLLOWUP  2025    LIPID PANEL  10/04/2025    ANNUAL WELLNESS VISIT  2025    TDAP/TD VACCINES (3 - Td or Tdap) 10/03/2026    COLORECTAL CANCER SCREENING  2028    HEPATITIS C SCREENING  Completed    RSV Vaccine - Adults  Completed    INFLUENZA VACCINE  Completed    Pneumococcal Vaccine 65+  Completed    MAMMOGRAM  Discontinued    URINE MICROALBUMIN  Discontinued    ZOSTER VACCINE  Discontinued       No orders of the  defined types were placed in this encounter.      Return in about 3 months (around 3/19/2025) for Follow-up HTN, HLD, DM, vit d def.

## 2024-12-19 NOTE — PROGRESS NOTES
Subjective   The ABCs of the Annual Wellness Visit  Medicare Wellness Visit      Keisha Padilla is a 74 y.o. patient who presents for a Medicare Wellness Visit.    The following portions of the patient's history were reviewed and   updated as appropriate: allergies, current medications, past family history, past medical history, past social history, past surgical history, and problem list.    Compared to one year ago, the patient's physical   health is worse.   Little more knee pain.    Compared to one year ago, the patient's mental   health is worse.   Worsening memory issues.     Recent Hospitalizations:  She was not admitted to the hospital during the last year.     Current Medical Providers:  Patient Care Team:  Stoney Diggs MD as PCP - General (Family Medicine)  Ori Sandhu MD as Consulting Physician (Ophthalmology)  Titi Lombardo MD as Obstetrician (Obstetrics and Gynecology)  Mac Ardon MD as Consulting Physician (Otolaryngology)    Outpatient Medications Prior to Visit   Medication Sig Dispense Refill    aspirin 81 MG tablet Take 1 tablet by mouth Daily. 90 tablet 3    Cholecalciferol (VITAMIN D3) 2000 UNITS capsule Take 1 capsule by mouth Daily.      diclofenac (VOLTAREN) 75 MG EC tablet Take 1 tablet by mouth Daily As Needed (OA Pain). 90 tablet 3    dilTIAZem CD (CARDIZEM CD) 240 MG 24 hr capsule Take 1 capsule by mouth Daily. 90 capsule 3    glipizide (GLUCOTROL) 5 MG tablet Take 1 tablet by mouth 2 (Two) Times a Day. 180 tablet 3    hydrOXYzine (ATARAX) 25 MG tablet Daily.      latanoprost (XALATAN) 0.005 % ophthalmic solution       lisinopril (PRINIVIL,ZESTRIL) 20 MG tablet Take 1 tablet by mouth Daily. 90 tablet 3    simvastatin (ZOCOR) 20 MG tablet Take 1 tablet by mouth Every Night. 90 tablet 3    timolol (TIMOPTIC) 0.5 % ophthalmic solution INSTILL 1 DROP EVERY 12 HOURS      Niacin (TRUE VITAMIN B3 PO) Take  by mouth.      vitamin B-12 (CYANOCOBALAMIN) 1000 MCG  "tablet Take 1 tablet by mouth Daily.      citalopram (CeleXA) 10 MG tablet Take 1 tablet by mouth Daily. (Patient not taking: Reported on 12/19/2024) 30 tablet 3    folic acid (FOLVITE) 1 MG tablet Daily. (Patient not taking: Reported on 12/19/2024)       No facility-administered medications prior to visit.     No opioid medication identified on active medication list. I have reviewed chart for other potential  high risk medication/s and harmful drug interactions in the elderly.      Aspirin is on active medication list. Aspirin use is indicated based on review of current medical condition/s. Pros and cons of this therapy have been discussed today. Benefits of this medication outweigh potential harm.  Patient has been encouraged to continue taking this medication.  .      Patient Active Problem List   Diagnosis    Morbid obesity    Vitamin D deficiency    Endometrial cancer    Primary open angle glaucoma    Annual GYN exam TVHUSO    Hypertension    Diabetes mellitus    Hyperlipidemia    Posterior vitreous detachment    Medicare annual wellness visit, subsequent    Hypermetropia    Left upper arm pain    Memory loss    Anxiety and depression     Advance Care Planning Advance Directive is not on file.  ACP discussion was held with the patient during this visit. Patient does not have an advance directive, information provided.            Objective   Vitals:    12/19/24 0839   BP: 116/68   BP Location: Left arm   Patient Position: Sitting   Cuff Size: Adult   Pulse: 59   Temp: 98.1 °F (36.7 °C)   TempSrc: Infrared   SpO2: 99%   Weight: 81 kg (178 lb 9.6 oz)   Height: 152.4 cm (60\")   PainSc: 0-No pain       Estimated body mass index is 34.88 kg/m² as calculated from the following:    Height as of this encounter: 152.4 cm (60\").    Weight as of this encounter: 81 kg (178 lb 9.6 oz).       Does the patient have evidence of cognitive impairment? Yes, Has appointment with neurology.    Lab Results   Component Value Date    " TRIG 84 10/04/2024    HDL 54 10/04/2024    LDL 85 10/04/2024    VLDL 16 10/04/2024    HGBA1C 6.00 (H) 10/04/2024                                                                                                Health  Risk Assessment    Smoking Status:  Social History     Tobacco Use   Smoking Status Never   Smokeless Tobacco Never     Alcohol Consumption:  Social History     Substance and Sexual Activity   Alcohol Use No       Fall Risk Screen  STEADI Fall Risk Assessment was completed, and patient is at MODERATE risk for falls. Assessment completed on:2024    Depression Screening   Little interest or pleasure in doing things? Not at all   Feeling down, depressed, or hopeless? Not at all   PHQ-2 Total Score 0      Health Habits and Functional and Cognitive Screenin/19/2024     8:37 AM   Functional & Cognitive Status   Do you have difficulty preparing food and eating? No   Do you have difficulty bathing yourself, getting dressed or grooming yourself? No   Do you have difficulty using the toilet? No   Do you have difficulty moving around from place to place? No   Do you have trouble with steps or getting out of a bed or a chair? No   Current Diet Well Balanced Diet   Dental Exam Up to date   Eye Exam Not up to date   Exercise (times per week) 0 times per week   Current Exercises Include No Regular Exercise;Walking   Do you need help using the phone?  No   Are you deaf or do you have serious difficulty hearing?  Yes   Do you need help to go to places out of walking distance? Yes   Do you need help shopping? No   Do you need help preparing meals?  No   Do you need help with housework?  No   Do you need help with laundry? No   Do you need help taking your medications? No   Do you need help managing money? No   Do you ever drive or ride in a car without wearing a seat belt? No   Have you felt unusual stress, anger or loneliness in the last month? Yes   Who do you live with? Spouse   If you need help, do you  have trouble finding someone available to you? No   Have you been bothered in the last four weeks by sexual problems? No   Do you have difficulty concentrating, remembering or making decisions? Yes           Age-appropriate Screening Schedule:  Refer to the list below for future screening recommendations based on patient's age, sex and/or medical conditions. Orders for these recommended tests are listed in the plan section. The patient has been provided with a written plan.    Health Maintenance List  Health Maintenance   Topic Date Due    DXA SCAN  12/19/2024 (Originally 4/4/2021)    COVID-19 Vaccine (7 - 2024-25 season) 03/10/2025 (Originally 9/1/2024)    HEMOGLOBIN A1C  04/04/2025    DIABETIC EYE EXAM  04/05/2025    BMI FOLLOWUP  09/23/2025    LIPID PANEL  10/04/2025    ANNUAL WELLNESS VISIT  12/19/2025    TDAP/TD VACCINES (3 - Td or Tdap) 10/03/2026    COLORECTAL CANCER SCREENING  02/22/2028    HEPATITIS C SCREENING  Completed    RSV Vaccine - Adults  Completed    INFLUENZA VACCINE  Completed    Pneumococcal Vaccine 65+  Completed    MAMMOGRAM  Discontinued    URINE MICROALBUMIN  Discontinued    ZOSTER VACCINE  Discontinued                                                                                                                                                CMS Preventative Services Quick Reference  Risk Factors Identified During Encounter  Fall Risk-High or Moderate: Discussed Fall Prevention in the home    The above risks/problems have been discussed with the patient.  Pertinent information has been shared with the patient in the After Visit Summary.  An After Visit Summary and PPPS were made available to the patient.    Follow Up:   Next Medicare Wellness visit to be scheduled in 1 year.         Additional E&M Note during same encounter follows:  Patient has additional, significant, and separately identifiable condition(s)/problem(s) that require work above and beyond the Medicare Wellness Visit  "    Chief Complaint  Medicare Wellness-subsequent and Abrasion (On toe)    Subjective   HPI  Keisha is also being seen today for additional medical problem/s. She has spot on her right fifth toe that is painful. It has been there for several weeks. It is a hard flesh colored lesion.     Review of Systems   Constitutional:  Negative for activity change, chills, fatigue and fever.   HENT:  Negative for congestion, postnasal drip, sinus pressure and sore throat.    Eyes:  Negative for visual disturbance.   Respiratory:  Negative for shortness of breath and wheezing.    Cardiovascular:  Negative for chest pain, palpitations and leg swelling.   Gastrointestinal:  Negative for abdominal pain, blood in stool, constipation, diarrhea, nausea and vomiting.   Genitourinary:  Negative for difficulty urinating, frequency and urgency.   Musculoskeletal:  Negative for arthralgias, gait problem and myalgias.   Skin:  Negative for rash.   Neurological:  Positive for confusion (intermittent). Negative for dizziness, seizures and weakness.   Psychiatric/Behavioral:  Negative for decreased concentration and suicidal ideas. The patient is not nervous/anxious.               Objective   Vital Signs:  /68 (BP Location: Left arm, Patient Position: Sitting, Cuff Size: Adult)   Pulse 59   Temp 98.1 °F (36.7 °C) (Infrared)   Ht 152.4 cm (60\")   Wt 81 kg (178 lb 9.6 oz)   SpO2 99%   BMI 34.88 kg/m²   Physical Exam  Constitutional:       General: She is not in acute distress.     Appearance: She is well-developed. She is not diaphoretic.   HENT:      Head: Atraumatic.   Cardiovascular:      Rate and Rhythm: Normal rate and regular rhythm.      Heart sounds: Normal heart sounds. No murmur heard.     No friction rub. No gallop.   Pulmonary:      Effort: Pulmonary effort is normal. No respiratory distress.      Breath sounds: Normal breath sounds. No stridor. No wheezing, rhonchi or rales.   Musculoskeletal:      Cervical back: Normal " range of motion and neck supple.   Feet:      Right foot:      Skin integrity: Callus (callus on the lateral 5th toe. Tender to touch, No redness or swelling.) and dry skin present.      Toenail Condition: Right toenails are normal.      Left foot:      Skin integrity: Skin integrity normal. Dry skin present.      Toenail Condition: Left toenails are normal.   Skin:     General: Skin is warm and dry.   Neurological:      Mental Status: She is alert and oriented to person, place, and time.   Psychiatric:         Behavior: Behavior normal.                       Assessment and Plan            Medicare annual wellness visit, subsequent  The patient is here for health maintenance visit.  Currently, the patient consumes a healthy diet and has an inadequate exercise regimen.  Screening lab work is ordered.  Immunizations were reviewed today.  Advice and education was given regarding nutrition, aerobic exercise, routine dental evaluations, routine eye exams, reproductive health, cardiovascular risk reduction, sunscreen use, self skin examination (annual dermatology evaluations) and seatbelt use (general overall safety).  Further recommendations will be given if needed after lab evaluation.  Annual wellness evaluation is recommended.    Orders:    Comprehensive Metabolic Panel; Future    Vitamin D,25-Hydroxy; Future    Urinalysis With Culture If Indicated - Urine, Clean Catch; Future    CBC & Differential; Future    Vitamin D deficiency    Orders:    Vitamin D,25-Hydroxy; Future    Primary hypertension      Orders:    Comprehensive Metabolic Panel; Future    CBC & Differential; Future    Type 2 diabetes mellitus without complication, without long-term current use of insulin      Orders:    Comprehensive Metabolic Panel; Future    CBC & Differential; Future    Callus of foot  Patient will continue supportive measures at home using moisturizing cream in the feet daily.  She can also use an offloading pad over the callus on the  toe.  It was recommended the patient see a podiatrist for further evaluation but patient declines at this time.         Memory loss  Patient continues struggle with memory loss.  Will order urinalysis reflex to culture to evaluate for UTI which could cause acute mental status changes.  Patient is encouraged to keep her appointment with neurology for further evaluation of memory issues.  If urine studies concerning for UTI will treat.  RTC/ED precautions given.                 Follow Up   Return in about 3 months (around 3/19/2025) for Follow-up HTN, HLD, DM, vit d def.  Patient was given instructions and counseling regarding her condition or for health maintenance advice. Please see specific information pulled into the AVS if appropriate.

## 2024-12-19 NOTE — ASSESSMENT & PLAN NOTE
The patient is here for health maintenance visit.  Currently, the patient consumes a healthy diet and has an inadequate exercise regimen.  Screening lab work is ordered.  Immunizations were reviewed today.  Advice and education was given regarding nutrition, aerobic exercise, routine dental evaluations, routine eye exams, reproductive health, cardiovascular risk reduction, sunscreen use, self skin examination (annual dermatology evaluations) and seatbelt use (general overall safety).  Further recommendations will be given if needed after lab evaluation.  Annual wellness evaluation is recommended.    Orders:    Comprehensive Metabolic Panel; Future    Vitamin D,25-Hydroxy; Future    Urinalysis With Culture If Indicated - Urine, Clean Catch; Future    CBC & Differential; Future

## 2024-12-20 DIAGNOSIS — N30.00 ACUTE CYSTITIS WITHOUT HEMATURIA: Primary | ICD-10-CM

## 2024-12-20 PROBLEM — L84 CALLUS OF FOOT: Status: ACTIVE | Noted: 2024-12-20

## 2024-12-20 RX ORDER — CEFDINIR 300 MG/1
300 CAPSULE ORAL 2 TIMES DAILY
Qty: 10 CAPSULE | Refills: 0 | Status: SHIPPED | OUTPATIENT
Start: 2024-12-20

## 2024-12-20 NOTE — ASSESSMENT & PLAN NOTE
Patient will continue supportive measures at home using moisturizing cream in the feet daily.  She can also use an offloading pad over the callus on the toe.  It was recommended the patient see a podiatrist for further evaluation but patient declines at this time.

## 2024-12-20 NOTE — ASSESSMENT & PLAN NOTE
Patient continues struggle with memory loss.  Will order urinalysis reflex to culture to evaluate for UTI which could cause acute mental status changes.  Patient is encouraged to keep her appointment with neurology for further evaluation of memory issues.  If urine studies concerning for UTI will treat.  RTC/ED precautions given.

## 2024-12-22 LAB — BACTERIA SPEC AEROBE CULT: ABNORMAL

## 2024-12-23 ENCOUNTER — TELEPHONE (OUTPATIENT)
Dept: FAMILY MEDICINE CLINIC | Facility: CLINIC | Age: 74
End: 2024-12-23
Payer: MEDICARE

## 2024-12-23 NOTE — TELEPHONE ENCOUNTER
Hub to [employer]ay    Called patient and left a message for patient to return our call. Also sent message through to patient my chart account.    Please the patient know that her urine culture grew E. coli which was sensitive to all antibiotics tested.  The antibiotic prescribed for her should clear the infection.  If her symptoms fail to improve or worsen please have her return to clinic for further evaluation.

## 2024-12-31 ENCOUNTER — OFFICE VISIT (OUTPATIENT)
Dept: NEUROLOGY | Facility: CLINIC | Age: 74
End: 2024-12-31
Payer: MEDICARE

## 2024-12-31 ENCOUNTER — LAB (OUTPATIENT)
Dept: LAB | Facility: HOSPITAL | Age: 74
End: 2024-12-31
Payer: MEDICARE

## 2024-12-31 VITALS
BODY MASS INDEX: 35.06 KG/M2 | OXYGEN SATURATION: 99 % | SYSTOLIC BLOOD PRESSURE: 122 MMHG | HEIGHT: 60 IN | HEART RATE: 57 BPM | DIASTOLIC BLOOD PRESSURE: 64 MMHG | WEIGHT: 178.57 LBS

## 2024-12-31 DIAGNOSIS — R41.3 MEMORY LOSS: Primary | ICD-10-CM

## 2024-12-31 DIAGNOSIS — R41.3 MEMORY LOSS: ICD-10-CM

## 2024-12-31 LAB
FOLATE SERPL-MCNC: >20 NG/ML (ref 4.78–24.2)
VIT B12 BLD-MCNC: 1281 PG/ML (ref 211–946)

## 2024-12-31 PROCEDURE — 3074F SYST BP LT 130 MM HG: CPT | Performed by: PSYCHIATRY & NEUROLOGY

## 2024-12-31 PROCEDURE — 3078F DIAST BP <80 MM HG: CPT | Performed by: PSYCHIATRY & NEUROLOGY

## 2024-12-31 PROCEDURE — 36415 COLL VENOUS BLD VENIPUNCTURE: CPT

## 2024-12-31 PROCEDURE — 82607 VITAMIN B-12: CPT

## 2024-12-31 PROCEDURE — 82746 ASSAY OF FOLIC ACID SERUM: CPT

## 2024-12-31 PROCEDURE — 1159F MED LIST DOCD IN RCRD: CPT | Performed by: PSYCHIATRY & NEUROLOGY

## 2024-12-31 PROCEDURE — 1160F RVW MEDS BY RX/DR IN RCRD: CPT | Performed by: PSYCHIATRY & NEUROLOGY

## 2024-12-31 PROCEDURE — 99204 OFFICE O/P NEW MOD 45 MIN: CPT | Performed by: PSYCHIATRY & NEUROLOGY

## 2024-12-31 RX ORDER — ACETAMINOPHEN 325 MG/1
650 TABLET ORAL EVERY 6 HOURS PRN
COMMUNITY

## 2024-12-31 RX ORDER — DONEPEZIL HYDROCHLORIDE 5 MG/1
5 TABLET, FILM COATED ORAL NIGHTLY
Qty: 30 TABLET | Refills: 2 | Status: SHIPPED | OUTPATIENT
Start: 2024-12-31 | End: 2025-12-31
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
## 2025-01-23 ENCOUNTER — HOSPITAL ENCOUNTER (OUTPATIENT)
Dept: CT IMAGING | Facility: HOSPITAL | Age: 75
Discharge: HOME | End: 2025-01-23
Admitting: PSYCHIATRY & NEUROLOGY
Payer: MEDICARE

## 2025-01-23 DIAGNOSIS — R41.3 MEMORY LOSS: ICD-10-CM

## 2025-01-23 PROCEDURE — 70450 CT HEAD/BRAIN W/O DYE: CPT

## 2025-01-24 ENCOUNTER — TELEPHONE (OUTPATIENT)
Dept: NEUROLOGY | Facility: CLINIC | Age: 75
End: 2025-01-24
Payer: MEDICARE

## 2025-01-24 NOTE — TELEPHONE ENCOUNTER
Spoke to daughter (Holly) to inform per Dr Colunga that Keisha's head CT was normal with no concerning findings.  She expressed appreciation.

## 2025-01-24 NOTE — TELEPHONE ENCOUNTER
----- Message from Marquise Colunga sent at 1/24/2025  7:41 AM EST -----  Notify pt HCT has no concerning findings

## 2025-02-24 DIAGNOSIS — E11.9 TYPE 2 DIABETES MELLITUS WITHOUT COMPLICATION, WITHOUT LONG-TERM CURRENT USE OF INSULIN: ICD-10-CM

## 2025-02-24 DIAGNOSIS — I10 PRIMARY HYPERTENSION: ICD-10-CM

## 2025-02-24 RX ORDER — LISINOPRIL 20 MG/1
20 TABLET ORAL DAILY
Qty: 90 TABLET | Refills: 0 | Status: SHIPPED | OUTPATIENT
Start: 2025-02-24

## 2025-02-24 RX ORDER — DILTIAZEM HYDROCHLORIDE 240 MG/1
240 CAPSULE, EXTENDED RELEASE ORAL DAILY
Qty: 90 CAPSULE | Refills: 0 | Status: SHIPPED | OUTPATIENT
Start: 2025-02-24

## 2025-03-24 DIAGNOSIS — E11.9 TYPE 2 DIABETES MELLITUS WITHOUT COMPLICATION, WITHOUT LONG-TERM CURRENT USE OF INSULIN: ICD-10-CM

## 2025-03-24 DIAGNOSIS — E78.2 MIXED HYPERLIPIDEMIA: ICD-10-CM

## 2025-03-24 RX ORDER — SIMVASTATIN 20 MG
20 TABLET ORAL NIGHTLY
Qty: 90 TABLET | Refills: 0 | Status: SHIPPED | OUTPATIENT
Start: 2025-03-24

## 2025-03-24 RX ORDER — GLIPIZIDE 5 MG/1
5 TABLET ORAL 2 TIMES DAILY
Qty: 180 TABLET | Refills: 0 | Status: SHIPPED | OUTPATIENT
Start: 2025-03-24

## 2025-03-27 RX ORDER — DONEPEZIL HYDROCHLORIDE 5 MG/1
5 TABLET, FILM COATED ORAL NIGHTLY
Qty: 30 TABLET | Refills: 0 | Status: SHIPPED | OUTPATIENT
Start: 2025-03-27

## 2025-03-27 NOTE — TELEPHONE ENCOUNTER
Rx Refill Note  Requested Prescriptions     Pending Prescriptions Disp Refills    donepezil (ARICEPT) 5 MG tablet [Pharmacy Med Name: Donepezil HCl 5 MG Oral Tablet] 30 tablet 0     Sig: TAKE 1 TABLET BY MOUTH ONCE DAILY AT NIGHT      Last filled:  12/31/24 w/2  Last office visit with prescribing clinician: 12/31/2024      Next office visit with prescribing clinician: 4/30/2025     Luci Lux MA  03/27/25, 12:57 EDT

## 2025-04-01 ENCOUNTER — LAB (OUTPATIENT)
Dept: LAB | Facility: HOSPITAL | Age: 75
End: 2025-04-01
Payer: MEDICARE

## 2025-04-01 ENCOUNTER — OFFICE VISIT (OUTPATIENT)
Dept: FAMILY MEDICINE CLINIC | Facility: CLINIC | Age: 75
End: 2025-04-01
Payer: MEDICARE

## 2025-04-01 VITALS
BODY MASS INDEX: 34.91 KG/M2 | TEMPERATURE: 98.2 F | DIASTOLIC BLOOD PRESSURE: 68 MMHG | HEART RATE: 52 BPM | WEIGHT: 177.8 LBS | SYSTOLIC BLOOD PRESSURE: 142 MMHG | HEIGHT: 60 IN | OXYGEN SATURATION: 97 %

## 2025-04-01 DIAGNOSIS — N30.00 ACUTE CYSTITIS WITHOUT HEMATURIA: ICD-10-CM

## 2025-04-01 DIAGNOSIS — I10 PRIMARY HYPERTENSION: ICD-10-CM

## 2025-04-01 DIAGNOSIS — R45.4 IRRITABILITY AND ANGER: ICD-10-CM

## 2025-04-01 DIAGNOSIS — E55.9 VITAMIN D DEFICIENCY: ICD-10-CM

## 2025-04-01 DIAGNOSIS — E78.2 MIXED HYPERLIPIDEMIA: ICD-10-CM

## 2025-04-01 DIAGNOSIS — E11.9 TYPE 2 DIABETES MELLITUS WITHOUT COMPLICATION, WITHOUT LONG-TERM CURRENT USE OF INSULIN: ICD-10-CM

## 2025-04-01 DIAGNOSIS — M17.0 PRIMARY OSTEOARTHRITIS OF BOTH KNEES: ICD-10-CM

## 2025-04-01 DIAGNOSIS — E11.9 TYPE 2 DIABETES MELLITUS WITHOUT COMPLICATION, WITHOUT LONG-TERM CURRENT USE OF INSULIN: Primary | ICD-10-CM

## 2025-04-01 LAB
25(OH)D3 SERPL-MCNC: 74.4 NG/ML (ref 30–100)
ALBUMIN SERPL-MCNC: 3.6 G/DL (ref 3.5–5.2)
ALBUMIN/GLOB SERPL: 1.3 G/DL
ALP SERPL-CCNC: 83 U/L (ref 39–117)
ALT SERPL W P-5'-P-CCNC: 13 U/L (ref 1–33)
ANION GAP SERPL CALCULATED.3IONS-SCNC: 8.9 MMOL/L (ref 5–15)
AST SERPL-CCNC: 19 U/L (ref 1–32)
BILIRUB SERPL-MCNC: 0.3 MG/DL (ref 0–1.2)
BILIRUB UR QL STRIP: NEGATIVE
BUN SERPL-MCNC: 22 MG/DL (ref 8–23)
BUN/CREAT SERPL: 25.6 (ref 7–25)
CALCIUM SPEC-SCNC: 9.6 MG/DL (ref 8.6–10.5)
CHLORIDE SERPL-SCNC: 106 MMOL/L (ref 98–107)
CHOLEST SERPL-MCNC: 159 MG/DL (ref 0–200)
CLARITY UR: CLEAR
CO2 SERPL-SCNC: 24.1 MMOL/L (ref 22–29)
COLOR UR: YELLOW
CREAT SERPL-MCNC: 0.86 MG/DL (ref 0.57–1)
EGFRCR SERPLBLD CKD-EPI 2021: 69.7 ML/MIN/1.73
GLOBULIN UR ELPH-MCNC: 2.8 GM/DL
GLUCOSE SERPL-MCNC: 143 MG/DL (ref 65–99)
GLUCOSE UR STRIP-MCNC: NEGATIVE MG/DL
HBA1C MFR BLD: 5.2 % (ref 4.8–5.6)
HDLC SERPL-MCNC: 63 MG/DL (ref 40–60)
HGB UR QL STRIP.AUTO: NEGATIVE
HOLD SPECIMEN: NORMAL
KETONES UR QL STRIP: NEGATIVE
LDLC SERPL CALC-MCNC: 84 MG/DL (ref 0–100)
LDLC/HDLC SERPL: 1.34 {RATIO}
LEUKOCYTE ESTERASE UR QL STRIP.AUTO: NEGATIVE
NITRITE UR QL STRIP: NEGATIVE
PH UR STRIP.AUTO: 6.5 [PH] (ref 5–8)
POTASSIUM SERPL-SCNC: 5.2 MMOL/L (ref 3.5–5.2)
PROT SERPL-MCNC: 6.4 G/DL (ref 6–8.5)
PROT UR QL STRIP: NEGATIVE
SODIUM SERPL-SCNC: 139 MMOL/L (ref 136–145)
SP GR UR STRIP: 1.02 (ref 1–1.03)
TRIGL SERPL-MCNC: 58 MG/DL (ref 0–150)
UROBILINOGEN UR QL STRIP: NORMAL
VLDLC SERPL-MCNC: 12 MG/DL (ref 5–40)

## 2025-04-01 PROCEDURE — 82306 VITAMIN D 25 HYDROXY: CPT

## 2025-04-01 PROCEDURE — 83036 HEMOGLOBIN GLYCOSYLATED A1C: CPT

## 2025-04-01 PROCEDURE — 81003 URINALYSIS AUTO W/O SCOPE: CPT

## 2025-04-01 PROCEDURE — 80053 COMPREHEN METABOLIC PANEL: CPT

## 2025-04-01 PROCEDURE — 80061 LIPID PANEL: CPT

## 2025-04-01 RX ORDER — DILTIAZEM HYDROCHLORIDE 240 MG/1
240 CAPSULE, COATED, EXTENDED RELEASE ORAL DAILY
Qty: 90 CAPSULE | Refills: 3 | Status: SHIPPED | OUTPATIENT
Start: 2025-04-01

## 2025-04-01 RX ORDER — SIMVASTATIN 20 MG
20 TABLET ORAL NIGHTLY
Qty: 90 TABLET | Refills: 3 | Status: SHIPPED | OUTPATIENT
Start: 2025-04-01

## 2025-04-01 RX ORDER — LISINOPRIL 20 MG/1
20 TABLET ORAL DAILY
Qty: 90 TABLET | Refills: 3 | Status: SHIPPED | OUTPATIENT
Start: 2025-04-01

## 2025-04-01 RX ORDER — DICLOFENAC SODIUM 75 MG/1
75 TABLET, DELAYED RELEASE ORAL DAILY PRN
Qty: 90 TABLET | Refills: 3 | Status: SHIPPED | OUTPATIENT
Start: 2025-04-01

## 2025-04-01 RX ORDER — GLIPIZIDE 5 MG/1
5 TABLET ORAL 2 TIMES DAILY
Qty: 180 TABLET | Refills: 3 | Status: SHIPPED | OUTPATIENT
Start: 2025-04-01

## 2025-04-01 NOTE — PATIENT INSTRUCTIONS
"Hypertension, Adult  High blood pressure (hypertension) is when the force of blood pumping through the arteries is too strong. The arteries are the blood vessels that carry blood from the heart throughout the body. Hypertension forces the heart to work harder to pump blood and may cause arteries to become narrow or stiff. Untreated or uncontrolled hypertension can lead to a heart attack, heart failure, a stroke, kidney disease, and other problems.  A blood pressure reading consists of a higher number over a lower number. Ideally, your blood pressure should be below 120/80. The first (\"top\") number is called the systolic pressure. It is a measure of the pressure in your arteries as your heart beats. The second (\"bottom\") number is called the diastolic pressure. It is a measure of the pressure in your arteries as the heart relaxes.  What are the causes?  The exact cause of this condition is not known. There are some conditions that result in high blood pressure.  What increases the risk?  Certain factors may make you more likely to develop high blood pressure. Some of these risk factors are under your control, including:  Smoking.  Not getting enough exercise or physical activity.  Being overweight.  Having too much fat, sugar, calories, or salt (sodium) in your diet.  Drinking too much alcohol.  Other risk factors include:  Having a personal history of heart disease, diabetes, high cholesterol, or kidney disease.  Stress.  Having a family history of high blood pressure and high cholesterol.  Having obstructive sleep apnea.  Age. The risk increases with age.  What are the signs or symptoms?  High blood pressure may not cause symptoms. Very high blood pressure (hypertensive crisis) may cause:  Headache.  Fast or irregular heartbeats (palpitations).  Shortness of breath.  Nosebleed.  Nausea and vomiting.  Vision changes.  Severe chest pain, dizziness, and seizures.  How is this diagnosed?  This condition is diagnosed by " measuring your blood pressure while you are seated, with your arm resting on a flat surface, your legs uncrossed, and your feet flat on the floor. The cuff of the blood pressure monitor will be placed directly against the skin of your upper arm at the level of your heart. Blood pressure should be measured at least twice using the same arm. Certain conditions can cause a difference in blood pressure between your right and left arms.  If you have a high blood pressure reading during one visit or you have normal blood pressure with other risk factors, you may be asked to:  Return on a different day to have your blood pressure checked again.  Monitor your blood pressure at home for 1 week or longer.  If you are diagnosed with hypertension, you may have other blood or imaging tests to help your health care provider understand your overall risk for other conditions.  How is this treated?  This condition is treated by making healthy lifestyle changes, such as eating healthy foods, exercising more, and reducing your alcohol intake. You may be referred for counseling on a healthy diet and physical activity.  Your health care provider may prescribe medicine if lifestyle changes are not enough to get your blood pressure under control and if:  Your systolic blood pressure is above 130.  Your diastolic blood pressure is above 80.  Your personal target blood pressure may vary depending on your medical conditions, your age, and other factors.  Follow these instructions at home:  Eating and drinking    Eat a diet that is high in fiber and potassium, and low in sodium, added sugar, and fat. An example of this eating plan is called the DASH diet. DASH stands for Dietary Approaches to Stop Hypertension. To eat this way:  Eat plenty of fresh fruits and vegetables. Try to fill one half of your plate at each meal with fruits and vegetables.  Eat whole grains, such as whole-wheat pasta, brown rice, or whole-grain bread. Fill about one  fourth of your plate with whole grains.  Eat or drink low-fat dairy products, such as skim milk or low-fat yogurt.  Avoid fatty cuts of meat, processed or cured meats, and poultry with skin. Fill about one fourth of your plate with lean proteins, such as fish, chicken without skin, beans, eggs, or tofu.  Avoid pre-made and processed foods. These tend to be higher in sodium, added sugar, and fat.  Reduce your daily sodium intake. Many people with hypertension should eat less than 1,500 mg of sodium a day.  Do not drink alcohol if:  Your health care provider tells you not to drink.  You are pregnant, may be pregnant, or are planning to become pregnant.  If you drink alcohol:  Limit how much you have to:  0-1 drink a day for women.  0-2 drinks a day for men.  Know how much alcohol is in your drink. In the [place] one drink equals one 12 oz bottle of beer (355 mL), one 5 oz glass of wine (148 mL), or one 1½ oz glass of hard liquor (44 mL).  Lifestyle    Work with your health care provider to maintain a healthy body weight or to lose weight. Ask what an ideal weight is for you.  Get at least 30 minutes of exercise that causes your heart to beat faster (aerobic exercise) most days of the week. Activities may include walking, swimming, or biking.  Include exercise to strengthen your muscles (resistance exercise), such as Pilates or lifting weights, as part of your weekly exercise routine. Try to do these types of exercises for 30 minutes at least 3 days a week.  Do not use any products that contain nicotine or tobacco. These products include cigarettes, chewing tobacco, and vaping devices, such as e-cigarettes. If you need help quitting, ask your health care provider.  Monitor your blood pressure at home as told by your health care provider.  Keep all follow-up visits. This is important.  Medicines  Take over-the-counter and prescription medicines only as told by your health care provider. Follow directions carefully. Blood  pressure medicines must be taken as prescribed.  Do not skip doses of blood pressure medicine. Doing this puts you at risk for problems and can make the medicine less effective.  Ask your health care provider about side effects or reactions to medicines that you should watch for.  Contact a health care provider if you:  Think you are having a reaction to a medicine you are taking.  Have headaches that keep coming back (recurring).  Feel dizzy.  Have swelling in your ankles.  Have trouble with your vision.  Get help right away if you:  Develop a severe headache or confusion.  Have unusual weakness or numbness.  Feel faint.  Have severe pain in your chest or abdomen.  Vomit repeatedly.  Have trouble breathing.  These symptoms may be an emergency. Get help right away. Call 911.  Do not wait to see if the symptoms will go away.  Do not drive yourself to the hospital.  Summary  Hypertension is when the force of blood pumping through your arteries is too strong. If this condition is not controlled, it may put you at risk for serious complications.  Your personal target blood pressure may vary depending on your medical conditions, your age, and other factors. For most people, a normal blood pressure is less than 120/80.  Hypertension is treated with lifestyle changes, medicines, or a combination of both. Lifestyle changes include losing weight, eating a healthy, low-sodium diet, exercising more, and limiting alcohol.  This information is not intended to replace advice given to you by your health care provider. Make sure you discuss any questions you have with your health care provider.  Document Revised: 10/25/2022 Document Reviewed: 10/25/2022  Elsevier Patient Education © 2024 [employer].Diabetes Mellitus and Nutrition, Adult  When you have diabetes, or diabetes mellitus, it is very important to have healthy eating habits because your blood sugar (glucose) levels are greatly affected by what you eat and drink. Eating  healthy foods in the right amounts, at about the same times every day, can help you:  Manage your blood glucose.  Lower your risk of heart disease.  Improve your blood pressure.  Reach or maintain a healthy weight.  What can affect my meal plan?  Every person with diabetes is different, and each person has different needs for a meal plan. Your health care provider may recommend that you work with a dietitian to make a meal plan that is best for you. Your meal plan may vary depending on factors such as:  The calories you need.  The medicines you take.  Your weight.  Your blood glucose, blood pressure, and cholesterol levels.  Your activity level.  Other health conditions you have, such as heart or kidney disease.  How do carbohydrates affect me?  Carbohydrates, also called carbs, affect your blood glucose level more than any other type of food. Eating carbs raises the amount of glucose in your blood.  It is important to know how many carbs you can safely have in each meal. This is different for every person. Your dietitian can help you calculate how many carbs you should have at each meal and for each snack.  How does alcohol affect me?  Alcohol can cause a decrease in blood glucose (hypoglycemia), especially if you use insulin or take certain diabetes medicines by mouth. Hypoglycemia can be a life-threatening condition. Symptoms of hypoglycemia, such as sleepiness, dizziness, and confusion, are similar to symptoms of having too much alcohol.  Do not drink alcohol if:  Your health care provider tells you not to drink.  You are pregnant, may be pregnant, or are planning to become pregnant.  If you drink alcohol:  Limit how much you have to:  0-1 drink a day for women.  0-2 drinks a day for men.  Know how much alcohol is in your drink. In the [place] one drink equals one 12 oz bottle of beer (355 mL), one 5 oz glass of wine (148 mL), or one 1½ oz glass of hard liquor (44 mL).  Keep yourself hydrated with water, diet  "soda, or unsweetened iced tea. Keep in mind that regular soda, juice, and other mixers may contain a lot of sugar and must be counted as carbs.  What are tips for following this plan?    Reading food labels  Start by checking the serving size on the Nutrition Facts label of packaged foods and drinks. The number of calories and the amount of carbs, fats, and other nutrients listed on the label are based on one serving of the item. Many items contain more than one serving per package.  Check the total grams (g) of carbs in one serving.  Check the number of grams of saturated fats and trans fats in one serving. Choose foods that have a low amount or none of these fats.  Check the number of milligrams (mg) of salt (sodium) in one serving. Most people should limit total sodium intake to less than 2,300 mg per day.  Always check the nutrition information of foods labeled as \"low-fat\" or \"nonfat.\" These foods may be higher in added sugar or refined carbs and should be avoided.  Talk to your dietitian to identify your daily goals for nutrients listed on the label.  Shopping  Avoid buying canned, pre-made, or processed foods. These foods tend to be high in fat, sodium, and added sugar.  Shop around the outside edge of the grocery store. This is where you will most often find fresh fruits and vegetables, bulk grains, fresh meats, and fresh dairy products.  Cooking  Use low-heat cooking methods, such as baking, instead of high-heat cooking methods, such as deep frying.  Cook using healthy oils, such as olive, canola, or sunflower oil.  Avoid cooking with butter, cream, or high-fat meats.  Meal planning  Eat meals and snacks regularly, preferably at the same times every day. Avoid going long periods of time without eating.  Eat foods that are high in fiber, such as fresh fruits, vegetables, beans, and whole grains.  Eat 4-6 oz (112-168 g) of lean protein each day, such as lean meat, chicken, fish, eggs, or tofu. One ounce (oz) " (28 g) of lean protein is equal to:  1 oz (28 g) of meat, chicken, or fish.  1 egg.  ¼ cup (62 g) of tofu.  Eat some foods each day that contain healthy fats, such as avocado, nuts, seeds, and fish.  What foods should I eat?  Fruits  Berries. Apples. Oranges. Peaches. Apricots. Plums. Grapes. Mangoes. Papayas. Pomegranates. Kiwi. Cherries.  Vegetables  Leafy greens, including lettuce, spinach, kale, chard, koffi greens, mustard greens, and cabbage. Beets. Cauliflower. Broccoli. Carrots. Green beans. Tomatoes. Peppers. Onions. Cucumbers. [place] sprouts.  Grains  Whole grains, such as whole-wheat or whole-grain bread, crackers, tortillas, cereal, and pasta. Unsweetened oatmeal. Quinoa. Brown or wild rice.  Meats and other proteins  Seafood. Poultry without skin. Lean cuts of poultry and beef. Tofu. Nuts. Seeds.  Dairy  Low-fat or fat-free dairy products such as milk, yogurt, and cheese.  The items listed above may not be a complete list of foods and beverages you can eat and drink. Contact a dietitian for more information.  What foods should I avoid?  Fruits  Fruits canned with syrup.  Vegetables  Canned vegetables. Frozen vegetables with butter or cream sauce.  Grains  Refined white flour and flour products such as bread, pasta, snack foods, and cereals. Avoid all processed foods.  Meats and other proteins  Fatty cuts of meat. Poultry with skin. Breaded or fried meats. Processed meat. Avoid saturated fats.  Dairy  Full-fat yogurt, cheese, or milk.  Beverages  Sweetened drinks, such as soda or iced tea.  The items listed above may not be a complete list of foods and beverages you should avoid. Contact a dietitian for more information.  Questions to ask a health care provider  Do I need to meet with a certified diabetes care and ?  Do I need to meet with a dietitian?  What number can I call if I have questions?  When are the best times to check my blood glucose?  Where to find more  information:  American Diabetes Association: diabetes.org  Academy of Nutrition and Dietetics: eatright.org  National [place] of Diabetes and Digestive and Kidney Diseases: niddk.nih.gov  Association of Diabetes Care & Education Specialists: diabeteseducator.org  Summary  It is important to have healthy eating habits because your blood sugar (glucose) levels are greatly affected by what you eat and drink. It is important to use alcohol carefully.  A healthy meal plan will help you manage your blood glucose and lower your risk of heart disease.  Your health care provider may recommend that you work with a dietitian to make a meal plan that is best for you.  This information is not intended to replace advice given to you by your health care provider. Make sure you discuss any questions you have with your health care provider.  Document Revised: 07/20/2021 Document Reviewed: 07/21/2021  Elsevier Patient Education © 2024 Elsevier Inc.

## 2025-04-01 NOTE — PROGRESS NOTES
Keisha Padilla is a 75 y.o. female who presents today for Hypertension, Diabetes, Vitamin D Deficiency, and Hyperlipidemia      HPI     She has been taking  cardia and lisinopril as directed but has not been checking blood sugar at home. She has been taking glipizide twice a day. She has not been checking sugar at home. She has been taking simvastatin nightly. She was getting high end of normal on vitamin D so has stopped the supplement and is not just taking a multivitamin. She has tolerated medications well.  Patient was treated for UTI in December. She currently has not urine symptoms but has been more irritable and daughter is concerned she may have UTI again.     Review of Systems   Constitutional:  Negative for fever and unexpected weight loss.   HENT:  Negative for congestion, ear pain and sore throat.    Eyes:  Negative for visual disturbance.   Respiratory:  Negative for cough, shortness of breath and wheezing.    Cardiovascular:  Negative for chest pain and palpitations.   Gastrointestinal:  Negative for abdominal pain, blood in stool, constipation, diarrhea, nausea, vomiting and GERD.   Endocrine: Negative for polydipsia and polyuria.   Genitourinary:  Negative for difficulty urinating.   Musculoskeletal:  Negative for joint swelling.   Skin:  Negative for rash and skin lesions.   Allergic/Immunologic: Negative for environmental allergies.   Neurological:  Positive for memory problem. Negative for seizures and syncope.   Hematological:  Does not bruise/bleed easily.   Psychiatric/Behavioral:  Positive for dysphoric mood, sleep disturbance, depressed mood and stress. Negative for self-injury and suicidal ideas. The patient is nervous/anxious.         The following portions of the patient's history were reviewed and updated as appropriate: allergies, current medications, past family history, past medical history, past social history, past surgical history and problem list.    Current Outpatient  "Medications on File Prior to Visit   Medication Sig Dispense Refill    acetaminophen (TYLENOL) 325 MG tablet Take 2 tablets by mouth Every 6 (Six) Hours As Needed for Mild Pain.      aspirin 81 MG tablet Take 1 tablet by mouth Daily. 90 tablet 3    donepezil (ARICEPT) 5 MG tablet TAKE 1 TABLET BY MOUTH ONCE DAILY AT NIGHT 30 tablet 0    latanoprost (XALATAN) 0.005 % ophthalmic solution       multivitamin with minerals (MULTIVITAMIN ADULT PO) Take 1 tablet by mouth Daily.      timolol (TIMOPTIC) 0.5 % ophthalmic solution INSTILL 1 DROP EVERY 12 HOURS      [DISCONTINUED] Cartia  MG 24 hr capsule Take 1 capsule by mouth once daily 90 capsule 0    [DISCONTINUED] diclofenac (VOLTAREN) 75 MG EC tablet Take 1 tablet by mouth Daily As Needed (OA Pain). 90 tablet 3    [DISCONTINUED] glipizide (GLUCOTROL) 5 MG tablet Take 1 tablet by mouth twice daily 180 tablet 0    [DISCONTINUED] lisinopril (PRINIVIL,ZESTRIL) 20 MG tablet Take 1 tablet by mouth once daily 90 tablet 0    [DISCONTINUED] simvastatin (ZOCOR) 20 MG tablet TAKE 1 TABLET BY MOUTH ONCE DAILY AT NIGHT 90 tablet 0    [DISCONTINUED] Cholecalciferol (VITAMIN D3) 2000 UNITS capsule Take 1 capsule by mouth Every Other Day. (Patient not taking: Reported on 4/1/2025)      [DISCONTINUED] Niacin (TRUE VITAMIN B3 PO) Take  by mouth. (Patient not taking: Reported on 4/1/2025)       No current facility-administered medications on file prior to visit.       Allergies   Allergen Reactions    Sulfa Antibiotics Hives     Mother and daughter are allergic to it, she just avoids it    Augmentin [Amoxicillin-Pot Clavulanate] Nausea And Vomiting        Visit Vitals  /68   Pulse 52   Temp 98.2 °F (36.8 °C) (Infrared)   Ht 152.4 cm (60\")   Wt 80.6 kg (177 lb 12.8 oz)   SpO2 97%   BMI 34.72 kg/m²        Physical Exam  Constitutional:       General: She is not in acute distress.     Appearance: She is well-developed. She is not diaphoretic.   HENT:      Head: Atraumatic. "   Cardiovascular:      Rate and Rhythm: Normal rate and regular rhythm.      Heart sounds: Normal heart sounds. No murmur heard.     No friction rub. No gallop.   Pulmonary:      Effort: Pulmonary effort is normal. No respiratory distress.      Breath sounds: Normal breath sounds. No stridor. No wheezing, rhonchi or rales.   Musculoskeletal:      Cervical back: Normal range of motion and neck supple.   Skin:     General: Skin is warm and dry.   Neurological:      Mental Status: She is alert and oriented to person, place, and time.   Psychiatric:         Behavior: Behavior normal.          Results for orders placed or performed in visit on 12/31/24   Vitamin B12 & Folate    Collection Time: 12/31/24  9:58 AM    Specimen: Blood   Result Value Ref Range    Folate >20.00 4.78 - 24.20 ng/mL    Vitamin B-12 1,281 (H) 211 - 946 pg/mL        Problems Addressed this Visit          Cardiac and Vasculature    Hypertension    Hypertension is borderline  Continue current treatment regimen.  Dietary sodium restriction.  Weight loss.  Regular aerobic exercise.  Ambulatory blood pressure monitoring.  Blood pressure will be reassessedin 3 months.         Relevant Medications    dilTIAZem CD (Cartia XT) 240 MG 24 hr capsule    lisinopril (PRINIVIL,ZESTRIL) 20 MG tablet    Other Relevant Orders    Comprehensive Metabolic Panel    Lipid Panel    Hemoglobin A1c    Hyperlipidemia     Lipid abnormalities are stable    Plan:  Continue same medication/s without change.      Discussed medication dosage, use, side effects, and goals of treatment in detail.    Counseled patient on lifestyle modifications to help control hyperlipidemia.     Patient Treatment Goals:   LDL goal is less than 70    Followup in 3 months.         Relevant Medications    simvastatin (ZOCOR) 20 MG tablet    Other Relevant Orders    Comprehensive Metabolic Panel    Lipid Panel       Endocrine and Metabolic    Vitamin D deficiency    Has been taking multivitamin but  stopped extra vitamin D supplement. Will recheck vitamin D and make adjustment to supplement if needed.          Relevant Orders    Vitamin D,25-Hydroxy    Diabetes mellitus - Primary    Diabetes is stable.   Continue current treatment regimen.  Diabetes will be reassessed in 3 months         Relevant Medications    lisinopril (PRINIVIL,ZESTRIL) 20 MG tablet    glipizide (GLUCOTROL) 5 MG tablet    Other Relevant Orders    Comprehensive Metabolic Panel    Lipid Panel    Hemoglobin A1c       Musculoskeletal and Injuries    Primary osteoarthritis of both knees    Relevant Medications    diclofenac (VOLTAREN) 75 MG EC tablet     Other Visit Diagnoses         Irritability and anger        Relevant Orders    Urinalysis With Culture If Indicated - Urine, Clean Catch          Diagnoses         Codes Comments      Type 2 diabetes mellitus without complication, without long-term current use of insulin    -  Primary ICD-10-CM: E11.9  ICD-9-CM: 250.00       Primary hypertension     ICD-10-CM: I10  ICD-9-CM: 401.9       Mixed hyperlipidemia     ICD-10-CM: E78.2  ICD-9-CM: 272.2       Vitamin D deficiency     ICD-10-CM: E55.9  ICD-9-CM: 268.9       Primary osteoarthritis of both knees     ICD-10-CM: M17.0  ICD-9-CM: 715.16       Irritability and anger     ICD-10-CM: R45.4  ICD-9-CM: 799.22             Return in about 3 months (around 7/1/2025) for Follow-up HTN, DM, vitamin D def.    Stoney Diggs MD   4/1/2025

## 2025-04-01 NOTE — ASSESSMENT & PLAN NOTE
Has been taking multivitamin but stopped extra vitamin D supplement. Will recheck vitamin D and make adjustment to supplement if needed.

## 2025-04-17 RX ORDER — DONEPEZIL HYDROCHLORIDE 5 MG/1
5 TABLET, FILM COATED ORAL NIGHTLY
Qty: 90 TABLET | Refills: 1 | Status: SHIPPED | OUTPATIENT
Start: 2025-04-17

## 2025-08-05 ENCOUNTER — OFFICE VISIT (OUTPATIENT)
Dept: FAMILY MEDICINE CLINIC | Facility: CLINIC | Age: 75
End: 2025-08-05
Payer: MEDICARE

## 2025-08-05 ENCOUNTER — LAB (OUTPATIENT)
Dept: LAB | Facility: HOSPITAL | Age: 75
End: 2025-08-05
Payer: MEDICARE

## 2025-08-05 VITALS
BODY MASS INDEX: 36.28 KG/M2 | SYSTOLIC BLOOD PRESSURE: 126 MMHG | HEART RATE: 53 BPM | WEIGHT: 184.8 LBS | HEIGHT: 60 IN | OXYGEN SATURATION: 99 % | DIASTOLIC BLOOD PRESSURE: 84 MMHG | TEMPERATURE: 97.6 F

## 2025-08-05 DIAGNOSIS — E11.9 TYPE 2 DIABETES MELLITUS WITHOUT COMPLICATION, WITHOUT LONG-TERM CURRENT USE OF INSULIN: ICD-10-CM

## 2025-08-05 DIAGNOSIS — E78.5 HYPERLIPIDEMIA LDL GOAL <70: ICD-10-CM

## 2025-08-05 DIAGNOSIS — I10 PRIMARY HYPERTENSION: ICD-10-CM

## 2025-08-05 DIAGNOSIS — I10 PRIMARY HYPERTENSION: Primary | ICD-10-CM

## 2025-08-05 DIAGNOSIS — E55.9 VITAMIN D DEFICIENCY: ICD-10-CM

## 2025-08-05 LAB
25(OH)D3 SERPL-MCNC: 66.3 NG/ML (ref 30–100)
ALBUMIN SERPL-MCNC: 3.6 G/DL (ref 3.5–5.2)
ALBUMIN UR-MCNC: <1.2 MG/DL
ALBUMIN/GLOB SERPL: 1.3 G/DL
ALP SERPL-CCNC: 85 U/L (ref 39–117)
ALT SERPL W P-5'-P-CCNC: 13 U/L (ref 1–33)
ANION GAP SERPL CALCULATED.3IONS-SCNC: 11 MMOL/L (ref 5–15)
AST SERPL-CCNC: 23 U/L (ref 1–32)
BILIRUB SERPL-MCNC: 0.3 MG/DL (ref 0–1.2)
BUN SERPL-MCNC: 21 MG/DL (ref 8–23)
BUN/CREAT SERPL: 24.7 (ref 7–25)
CALCIUM SPEC-SCNC: 9.2 MG/DL (ref 8.6–10.5)
CHLORIDE SERPL-SCNC: 104 MMOL/L (ref 98–107)
CHOLEST SERPL-MCNC: 145 MG/DL (ref 0–200)
CO2 SERPL-SCNC: 22 MMOL/L (ref 22–29)
CREAT SERPL-MCNC: 0.85 MG/DL (ref 0.57–1)
CREAT UR-MCNC: 36.3 MG/DL
EGFRCR SERPLBLD CKD-EPI 2021: 70.7 ML/MIN/1.73
GLOBULIN UR ELPH-MCNC: 2.7 GM/DL
GLUCOSE SERPL-MCNC: 98 MG/DL (ref 65–99)
HBA1C MFR BLD: 5.7 % (ref 4.8–5.6)
HDLC SERPL-MCNC: 57 MG/DL (ref 40–60)
LDLC SERPL CALC-MCNC: 73 MG/DL (ref 0–100)
LDLC/HDLC SERPL: 1.28 {RATIO}
MICROALBUMIN/CREAT UR: NORMAL MG/G{CREAT}
POTASSIUM SERPL-SCNC: 4.7 MMOL/L (ref 3.5–5.2)
PROT SERPL-MCNC: 6.3 G/DL (ref 6–8.5)
SODIUM SERPL-SCNC: 137 MMOL/L (ref 136–145)
TRIGL SERPL-MCNC: 74 MG/DL (ref 0–150)
VLDLC SERPL-MCNC: 15 MG/DL (ref 5–40)

## 2025-08-05 PROCEDURE — 3074F SYST BP LT 130 MM HG: CPT | Performed by: FAMILY MEDICINE

## 2025-08-05 PROCEDURE — 1160F RVW MEDS BY RX/DR IN RCRD: CPT | Performed by: FAMILY MEDICINE

## 2025-08-05 PROCEDURE — 82043 UR ALBUMIN QUANTITATIVE: CPT

## 2025-08-05 PROCEDURE — G2211 COMPLEX E/M VISIT ADD ON: HCPCS | Performed by: FAMILY MEDICINE

## 2025-08-05 PROCEDURE — 80061 LIPID PANEL: CPT

## 2025-08-05 PROCEDURE — 1159F MED LIST DOCD IN RCRD: CPT | Performed by: FAMILY MEDICINE

## 2025-08-05 PROCEDURE — 80053 COMPREHEN METABOLIC PANEL: CPT

## 2025-08-05 PROCEDURE — 82306 VITAMIN D 25 HYDROXY: CPT

## 2025-08-05 PROCEDURE — 1126F AMNT PAIN NOTED NONE PRSNT: CPT | Performed by: FAMILY MEDICINE

## 2025-08-05 PROCEDURE — 99214 OFFICE O/P EST MOD 30 MIN: CPT | Performed by: FAMILY MEDICINE

## 2025-08-05 PROCEDURE — 3079F DIAST BP 80-89 MM HG: CPT | Performed by: FAMILY MEDICINE

## 2025-08-05 PROCEDURE — 82570 ASSAY OF URINE CREATININE: CPT

## 2025-08-05 PROCEDURE — 83036 HEMOGLOBIN GLYCOSYLATED A1C: CPT

## (undated) DEVICE — SYR 10ML CTRL LR LCK NSAF LF --

## (undated) DEVICE — TOWEL SURG W16XL26IN BLU NONFENESTRATED DLX ST 2 PER PK

## (undated) DEVICE — LIGHT HANDLE: Brand: DEVON

## (undated) DEVICE — KIT PROC EXTRM HND FT CUST LF --

## (undated) DEVICE — STERILE POLYISOPRENE POWDER-FREE SURGICAL GLOVES: Brand: PROTEXIS

## (undated) DEVICE — SOL IRR NACL 0.9% 500ML POUR --

## (undated) DEVICE — SUT ETHLN 4-0 18IN PC3 BLK --

## (undated) DEVICE — ST BIAS STOCKINETTE: Brand: DEROYAL

## (undated) DEVICE — DISPOSABLE TOURNIQUET CUFF SINGLE BLADDER, SINGLE PORT AND QUICK CONNECT CONNECTOR: Brand: COLOR CUFF

## (undated) DEVICE — BANDAGE COMPR 9 FTX4 IN SMOOTH COMFORTABLE SYNTH ESMRK LF

## (undated) DEVICE — PREP SKN CHLRAPRP 26ML TNT -- CONVERT TO ITEM 373320

## (undated) DEVICE — INTENDED FOR TISSUE SEPARATION, AND OTHER PROCEDURES THAT REQUIRE A SHARP SURGICAL BLADE TO PUNCTURE OR CUT.: Brand: BARD-PARKER ® CARBON RIB-BACK BLADES

## (undated) DEVICE — NEEDLE HYPO 25GA L1.5IN BVL ORIENTED ECLIPSE

## (undated) DEVICE — PREP SKN CHLORHEX GLUC 8OZ --

## (undated) DEVICE — KENDALL SCD EXPRESS SLEEVES, KNEE LENGTH, MEDIUM: Brand: KENDALL SCD

## (undated) DEVICE — GAMMEX® NON-LATEX SIZE 8, STERILE NEOPRENE POWDER-FREE SURGICAL GLOVE: Brand: GAMMEX

## (undated) DEVICE — BIPOLAR FORCEPS CORD,BANANA LEADS: Brand: VALLEYLAB

## (undated) DEVICE — 1010 S-DRAPE TOWEL DRAPE 10/BX: Brand: STERI-DRAPE™

## (undated) DEVICE — NDL PRT INJ NSAF BLNT 18GX1.5 --

## (undated) DEVICE — OCCLUSIVE GAUZE STRIP,3% BISMUTH TRIBROMOPHENATE IN PETROLATUM BLEND: Brand: XEROFORM